# Patient Record
Sex: MALE | Race: WHITE | NOT HISPANIC OR LATINO | URBAN - METROPOLITAN AREA
[De-identification: names, ages, dates, MRNs, and addresses within clinical notes are randomized per-mention and may not be internally consistent; named-entity substitution may affect disease eponyms.]

---

## 2017-01-25 ENCOUNTER — COMMUNICATION - HEALTHEAST (OUTPATIENT)
Dept: FAMILY MEDICINE | Facility: CLINIC | Age: 62
End: 2017-01-25

## 2017-02-24 ENCOUNTER — COMMUNICATION - HEALTHEAST (OUTPATIENT)
Dept: FAMILY MEDICINE | Facility: CLINIC | Age: 62
End: 2017-02-24

## 2017-02-24 DIAGNOSIS — E11.9 TYPE 2 DIABETES MELLITUS WITHOUT COMPLICATION (H): ICD-10-CM

## 2017-04-24 ENCOUNTER — COMMUNICATION - HEALTHEAST (OUTPATIENT)
Dept: FAMILY MEDICINE | Facility: CLINIC | Age: 62
End: 2017-04-24

## 2017-05-12 ENCOUNTER — AMBULATORY - HEALTHEAST (OUTPATIENT)
Dept: FAMILY MEDICINE | Facility: CLINIC | Age: 62
End: 2017-05-12

## 2017-05-12 ENCOUNTER — OFFICE VISIT - HEALTHEAST (OUTPATIENT)
Dept: FAMILY MEDICINE | Facility: CLINIC | Age: 62
End: 2017-05-12

## 2017-05-12 DIAGNOSIS — Z79.899 MEDICATION MANAGEMENT: ICD-10-CM

## 2017-05-12 DIAGNOSIS — Z12.5 PROSTATE CANCER SCREENING: ICD-10-CM

## 2017-05-12 DIAGNOSIS — R80.9 PROTEINURIA: ICD-10-CM

## 2017-05-12 DIAGNOSIS — E78.5 DYSLIPIDEMIA: ICD-10-CM

## 2017-05-12 DIAGNOSIS — I10 ESSENTIAL HYPERTENSION WITH GOAL BLOOD PRESSURE LESS THAN 130/80: ICD-10-CM

## 2017-05-12 DIAGNOSIS — E11.9 TYPE 2 DIABETES MELLITUS (H): ICD-10-CM

## 2017-05-12 LAB
CHOLEST SERPL-MCNC: 162 MG/DL
FASTING STATUS PATIENT QL REPORTED: ABNORMAL
HBA1C MFR BLD: 9.8 % (ref 3.5–6)
HDLC SERPL-MCNC: 43 MG/DL
LDLC SERPL CALC-MCNC: 80 MG/DL
PSA SERPL-MCNC: 2.1 NG/ML (ref 0–3.5)
TRIGL SERPL-MCNC: 193 MG/DL

## 2017-05-12 ASSESSMENT — MIFFLIN-ST. JEOR: SCORE: 1583.25

## 2017-05-15 ENCOUNTER — AMBULATORY - HEALTHEAST (OUTPATIENT)
Dept: FAMILY MEDICINE | Facility: CLINIC | Age: 62
End: 2017-05-15

## 2017-05-15 DIAGNOSIS — R74.8 ELEVATED LIVER ENZYMES: ICD-10-CM

## 2017-05-19 ENCOUNTER — HOSPITAL ENCOUNTER (OUTPATIENT)
Dept: ULTRASOUND IMAGING | Facility: CLINIC | Age: 62
Discharge: HOME | End: 2017-05-19
Attending: NURSE PRACTITIONER

## 2017-05-19 DIAGNOSIS — R74.8 ELEVATED LIVER ENZYMES: ICD-10-CM

## 2017-05-25 ENCOUNTER — COMMUNICATION - HEALTHEAST (OUTPATIENT)
Dept: FAMILY MEDICINE | Facility: CLINIC | Age: 62
End: 2017-05-25

## 2017-05-28 ENCOUNTER — AMBULATORY - HEALTHEAST (OUTPATIENT)
Dept: FAMILY MEDICINE | Facility: CLINIC | Age: 62
End: 2017-05-28

## 2017-05-28 ENCOUNTER — COMMUNICATION - HEALTHEAST (OUTPATIENT)
Dept: FAMILY MEDICINE | Facility: CLINIC | Age: 62
End: 2017-05-28

## 2017-05-28 DIAGNOSIS — E11.9 TYPE 2 DIABETES MELLITUS (H): ICD-10-CM

## 2017-05-28 DIAGNOSIS — R74.8 ELEVATED LIVER ENZYMES: ICD-10-CM

## 2017-06-05 ENCOUNTER — HOSPITAL ENCOUNTER (OUTPATIENT)
Dept: CT IMAGING | Facility: CLINIC | Age: 62
Discharge: HOME | End: 2017-06-05
Attending: NURSE PRACTITIONER

## 2017-06-05 DIAGNOSIS — R74.8 ELEVATED LIVER ENZYMES: ICD-10-CM

## 2017-06-13 ENCOUNTER — AMBULATORY - HEALTHEAST (OUTPATIENT)
Dept: EDUCATION SERVICES | Facility: CLINIC | Age: 62
End: 2017-06-13

## 2017-06-13 ENCOUNTER — OFFICE VISIT - HEALTHEAST (OUTPATIENT)
Dept: FAMILY MEDICINE | Facility: CLINIC | Age: 62
End: 2017-06-13

## 2017-06-13 DIAGNOSIS — R21 RASH: ICD-10-CM

## 2017-06-13 DIAGNOSIS — E11.65 TYPE 2 DIABETES MELLITUS WITH HYPERGLYCEMIA, WITHOUT LONG-TERM CURRENT USE OF INSULIN (H): ICD-10-CM

## 2017-06-13 ASSESSMENT — MIFFLIN-ST. JEOR: SCORE: 1565.57

## 2017-07-05 ENCOUNTER — OFFICE VISIT - HEALTHEAST (OUTPATIENT)
Dept: ENDOCRINOLOGY | Facility: CLINIC | Age: 62
End: 2017-07-05

## 2017-07-05 DIAGNOSIS — E11.9 TYPE 2 DIABETES MELLITUS (H): ICD-10-CM

## 2017-07-05 DIAGNOSIS — I10 ESSENTIAL HYPERTENSION: ICD-10-CM

## 2017-07-05 DIAGNOSIS — R80.9 PROTEINURIA: ICD-10-CM

## 2017-07-05 ASSESSMENT — MIFFLIN-ST. JEOR: SCORE: 1561.94

## 2017-07-26 ENCOUNTER — COMMUNICATION - HEALTHEAST (OUTPATIENT)
Dept: FAMILY MEDICINE | Facility: CLINIC | Age: 62
End: 2017-07-26

## 2017-07-26 DIAGNOSIS — I10 ESSENTIAL HYPERTENSION: ICD-10-CM

## 2017-08-23 ENCOUNTER — COMMUNICATION - HEALTHEAST (OUTPATIENT)
Dept: FAMILY MEDICINE | Facility: CLINIC | Age: 62
End: 2017-08-23

## 2017-08-23 DIAGNOSIS — E78.5 DYSLIPIDEMIA: ICD-10-CM

## 2017-09-27 ENCOUNTER — COMMUNICATION - HEALTHEAST (OUTPATIENT)
Dept: FAMILY MEDICINE | Facility: CLINIC | Age: 62
End: 2017-09-27

## 2017-09-27 DIAGNOSIS — E11.9 DIABETES (H): ICD-10-CM

## 2017-11-14 ENCOUNTER — RECORDS - HEALTHEAST (OUTPATIENT)
Dept: ADMINISTRATIVE | Facility: OTHER | Age: 62
End: 2017-11-14

## 2017-11-18 ENCOUNTER — COMMUNICATION - HEALTHEAST (OUTPATIENT)
Dept: FAMILY MEDICINE | Facility: CLINIC | Age: 62
End: 2017-11-18

## 2017-11-18 DIAGNOSIS — E11.9 TYPE 2 DIABETES MELLITUS (H): ICD-10-CM

## 2017-11-21 ENCOUNTER — COMMUNICATION - HEALTHEAST (OUTPATIENT)
Dept: FAMILY MEDICINE | Facility: CLINIC | Age: 62
End: 2017-11-21

## 2017-12-19 ENCOUNTER — OFFICE VISIT - HEALTHEAST (OUTPATIENT)
Dept: FAMILY MEDICINE | Facility: CLINIC | Age: 62
End: 2017-12-19

## 2017-12-19 ENCOUNTER — AMBULATORY - HEALTHEAST (OUTPATIENT)
Dept: FAMILY MEDICINE | Facility: CLINIC | Age: 62
End: 2017-12-19

## 2017-12-19 DIAGNOSIS — E66.3 OVERWEIGHT: ICD-10-CM

## 2017-12-19 DIAGNOSIS — E78.5 DYSLIPIDEMIA: ICD-10-CM

## 2017-12-19 DIAGNOSIS — E11.65 TYPE 2 DIABETES MELLITUS WITH HYPERGLYCEMIA, WITHOUT LONG-TERM CURRENT USE OF INSULIN (H): ICD-10-CM

## 2017-12-19 DIAGNOSIS — I10 ESSENTIAL HYPERTENSION: ICD-10-CM

## 2017-12-19 DIAGNOSIS — Z00.00 HEALTH CARE MAINTENANCE: ICD-10-CM

## 2017-12-19 LAB — HBA1C MFR BLD: 8.8 % (ref 3.5–6)

## 2017-12-19 ASSESSMENT — MIFFLIN-ST. JEOR: SCORE: 1529.96

## 2017-12-26 ENCOUNTER — COMMUNICATION - HEALTHEAST (OUTPATIENT)
Dept: FAMILY MEDICINE | Facility: CLINIC | Age: 62
End: 2017-12-26

## 2017-12-26 DIAGNOSIS — E11.9 DIABETES (H): ICD-10-CM

## 2018-01-02 ENCOUNTER — AMBULATORY - HEALTHEAST (OUTPATIENT)
Dept: EDUCATION SERVICES | Facility: CLINIC | Age: 63
End: 2018-01-02

## 2018-01-02 DIAGNOSIS — E11.65 TYPE 2 DIABETES MELLITUS WITH HYPERGLYCEMIA, WITHOUT LONG-TERM CURRENT USE OF INSULIN (H): ICD-10-CM

## 2018-01-06 ENCOUNTER — COMMUNICATION - HEALTHEAST (OUTPATIENT)
Dept: FAMILY MEDICINE | Facility: CLINIC | Age: 63
End: 2018-01-06

## 2018-01-06 DIAGNOSIS — E11.9 DIABETES (H): ICD-10-CM

## 2018-01-06 RX ORDER — GLUCOSAMINE HCL/CHONDROITIN SU 500-400 MG
1 CAPSULE ORAL 3 TIMES DAILY
Qty: 100 STRIP | Refills: 5 | Status: SHIPPED | OUTPATIENT
Start: 2018-01-06

## 2018-01-20 ENCOUNTER — COMMUNICATION - HEALTHEAST (OUTPATIENT)
Dept: FAMILY MEDICINE | Facility: CLINIC | Age: 63
End: 2018-01-20

## 2018-01-20 DIAGNOSIS — E11.65 TYPE 2 DIABETES MELLITUS WITH HYPERGLYCEMIA, WITHOUT LONG-TERM CURRENT USE OF INSULIN (H): ICD-10-CM

## 2018-01-28 ENCOUNTER — COMMUNICATION - HEALTHEAST (OUTPATIENT)
Dept: FAMILY MEDICINE | Facility: CLINIC | Age: 63
End: 2018-01-28

## 2018-01-28 DIAGNOSIS — E11.65 TYPE 2 DIABETES MELLITUS WITH HYPERGLYCEMIA, WITHOUT LONG-TERM CURRENT USE OF INSULIN (H): ICD-10-CM

## 2018-02-06 ENCOUNTER — OFFICE VISIT - HEALTHEAST (OUTPATIENT)
Dept: FAMILY MEDICINE | Facility: CLINIC | Age: 63
End: 2018-02-06

## 2018-02-06 DIAGNOSIS — R31.9 HEMATURIA: ICD-10-CM

## 2018-02-06 DIAGNOSIS — E11.65 TYPE 2 DIABETES MELLITUS WITH HYPERGLYCEMIA, WITHOUT LONG-TERM CURRENT USE OF INSULIN (H): ICD-10-CM

## 2018-02-06 LAB
ALBUMIN UR-MCNC: ABNORMAL MG/DL
APPEARANCE UR: CLEAR
BACTERIA #/AREA URNS HPF: ABNORMAL HPF
BILIRUB UR QL STRIP: NEGATIVE
COLOR UR AUTO: YELLOW
GLUCOSE UR STRIP-MCNC: ABNORMAL MG/DL
HGB UR QL STRIP: ABNORMAL
KETONES UR STRIP-MCNC: NEGATIVE MG/DL
LEUKOCYTE ESTERASE UR QL STRIP: NEGATIVE
MUCOUS THREADS #/AREA URNS LPF: ABNORMAL LPF
NITRATE UR QL: NEGATIVE
PH UR STRIP: 6 [PH] (ref 5–8)
RBC #/AREA URNS AUTO: ABNORMAL HPF
SP GR UR STRIP: 1.02 (ref 1–1.03)
SQUAMOUS #/AREA URNS AUTO: ABNORMAL LPF
UROBILINOGEN UR STRIP-ACNC: ABNORMAL
WBC #/AREA URNS AUTO: ABNORMAL HPF

## 2018-02-06 ASSESSMENT — MIFFLIN-ST. JEOR: SCORE: 1553.09

## 2018-02-07 LAB — BACTERIA SPEC CULT: NO GROWTH

## 2018-02-11 ENCOUNTER — AMBULATORY - HEALTHEAST (OUTPATIENT)
Dept: FAMILY MEDICINE | Facility: CLINIC | Age: 63
End: 2018-02-11

## 2018-02-11 DIAGNOSIS — R31.0 GROSS HEMATURIA: ICD-10-CM

## 2018-03-01 ENCOUNTER — COMMUNICATION - HEALTHEAST (OUTPATIENT)
Dept: FAMILY MEDICINE | Facility: CLINIC | Age: 63
End: 2018-03-01

## 2018-03-01 DIAGNOSIS — E11.9 TYPE 2 DIABETES MELLITUS WITHOUT COMPLICATION (H): ICD-10-CM

## 2018-03-02 ENCOUNTER — RECORDS - HEALTHEAST (OUTPATIENT)
Dept: ADMINISTRATIVE | Facility: OTHER | Age: 63
End: 2018-03-02

## 2018-03-30 ENCOUNTER — COMMUNICATION - HEALTHEAST (OUTPATIENT)
Dept: FAMILY MEDICINE | Facility: CLINIC | Age: 63
End: 2018-03-30

## 2018-04-01 ENCOUNTER — COMMUNICATION - HEALTHEAST (OUTPATIENT)
Dept: FAMILY MEDICINE | Facility: CLINIC | Age: 63
End: 2018-04-01

## 2018-04-01 DIAGNOSIS — E11.65 TYPE 2 DIABETES MELLITUS WITH HYPERGLYCEMIA, WITHOUT LONG-TERM CURRENT USE OF INSULIN (H): ICD-10-CM

## 2018-04-12 ENCOUNTER — AMBULATORY - HEALTHEAST (OUTPATIENT)
Dept: FAMILY MEDICINE | Facility: CLINIC | Age: 63
End: 2018-04-12

## 2018-04-12 ENCOUNTER — OFFICE VISIT - HEALTHEAST (OUTPATIENT)
Dept: FAMILY MEDICINE | Facility: CLINIC | Age: 63
End: 2018-04-12

## 2018-04-12 DIAGNOSIS — I10 ESSENTIAL HYPERTENSION: ICD-10-CM

## 2018-04-12 DIAGNOSIS — E11.9 TYPE 2 DIABETES MELLITUS (H): ICD-10-CM

## 2018-04-12 DIAGNOSIS — E11.65 TYPE 2 DIABETES MELLITUS WITH HYPERGLYCEMIA, WITHOUT LONG-TERM CURRENT USE OF INSULIN (H): ICD-10-CM

## 2018-04-12 DIAGNOSIS — E78.5 DYSLIPIDEMIA: ICD-10-CM

## 2018-04-12 DIAGNOSIS — E66.3 OVERWEIGHT: ICD-10-CM

## 2018-04-12 LAB
CREAT UR-MCNC: 27 MG/DL
HBA1C MFR BLD: 9.1 % (ref 3.5–6)
MICROALBUMIN UR-MCNC: 1.87 MG/DL (ref 0–1.99)
MICROALBUMIN/CREAT UR: 69.3 MG/G

## 2018-04-12 ASSESSMENT — MIFFLIN-ST. JEOR: SCORE: 1562.16

## 2018-05-08 ENCOUNTER — COMMUNICATION - HEALTHEAST (OUTPATIENT)
Dept: EDUCATION SERVICES | Facility: CLINIC | Age: 63
End: 2018-05-08

## 2018-05-08 ENCOUNTER — AMBULATORY - HEALTHEAST (OUTPATIENT)
Dept: EDUCATION SERVICES | Facility: CLINIC | Age: 63
End: 2018-05-08

## 2018-05-08 DIAGNOSIS — E11.65 TYPE 2 DIABETES MELLITUS WITH HYPERGLYCEMIA, WITHOUT LONG-TERM CURRENT USE OF INSULIN (H): ICD-10-CM

## 2018-05-09 ENCOUNTER — COMMUNICATION - HEALTHEAST (OUTPATIENT)
Dept: FAMILY MEDICINE | Facility: CLINIC | Age: 63
End: 2018-05-09

## 2018-05-09 DIAGNOSIS — I10 ESSENTIAL HYPERTENSION: ICD-10-CM

## 2018-05-10 ENCOUNTER — COMMUNICATION - HEALTHEAST (OUTPATIENT)
Dept: EDUCATION SERVICES | Facility: CLINIC | Age: 63
End: 2018-05-10

## 2018-05-10 DIAGNOSIS — E11.65 UNCONTROLLED TYPE 2 DIABETES MELLITUS WITH HYPERGLYCEMIA, UNSPECIFIED LONG TERM INSULIN USE STATUS: ICD-10-CM

## 2018-05-11 ENCOUNTER — AMBULATORY - HEALTHEAST (OUTPATIENT)
Dept: ENDOCRINOLOGY | Facility: CLINIC | Age: 63
End: 2018-05-11

## 2018-05-11 DIAGNOSIS — E11.65 TYPE 2 DIABETES MELLITUS WITH HYPERGLYCEMIA, WITHOUT LONG-TERM CURRENT USE OF INSULIN (H): ICD-10-CM

## 2018-05-13 ENCOUNTER — COMMUNICATION - HEALTHEAST (OUTPATIENT)
Dept: EDUCATION SERVICES | Facility: CLINIC | Age: 63
End: 2018-05-13

## 2018-05-13 DIAGNOSIS — E11.65 UNCONTROLLED TYPE 2 DIABETES MELLITUS WITH HYPERGLYCEMIA, UNSPECIFIED LONG TERM INSULIN USE STATUS: ICD-10-CM

## 2018-05-13 DIAGNOSIS — E11.65 TYPE 2 DIABETES MELLITUS WITH HYPERGLYCEMIA, WITHOUT LONG-TERM CURRENT USE OF INSULIN (H): ICD-10-CM

## 2018-05-15 ENCOUNTER — COMMUNICATION - HEALTHEAST (OUTPATIENT)
Dept: FAMILY MEDICINE | Facility: CLINIC | Age: 63
End: 2018-05-15

## 2018-05-17 ENCOUNTER — AMBULATORY - HEALTHEAST (OUTPATIENT)
Dept: FAMILY MEDICINE | Facility: CLINIC | Age: 63
End: 2018-05-17

## 2018-05-18 ENCOUNTER — RECORDS - HEALTHEAST (OUTPATIENT)
Dept: ADMINISTRATIVE | Facility: OTHER | Age: 63
End: 2018-05-18

## 2018-05-18 ENCOUNTER — COMMUNICATION - HEALTHEAST (OUTPATIENT)
Dept: FAMILY MEDICINE | Facility: CLINIC | Age: 63
End: 2018-05-18

## 2018-05-21 ENCOUNTER — AMBULATORY - HEALTHEAST (OUTPATIENT)
Dept: FAMILY MEDICINE | Facility: CLINIC | Age: 63
End: 2018-05-21

## 2018-06-01 ENCOUNTER — COMMUNICATION - HEALTHEAST (OUTPATIENT)
Dept: FAMILY MEDICINE | Facility: CLINIC | Age: 63
End: 2018-06-01

## 2018-06-01 DIAGNOSIS — E78.5 HYPERLIPIDEMIA: ICD-10-CM

## 2018-06-15 ENCOUNTER — OFFICE VISIT - HEALTHEAST (OUTPATIENT)
Dept: ENDOCRINOLOGY | Facility: CLINIC | Age: 63
End: 2018-06-15

## 2018-06-15 DIAGNOSIS — E11.65 TYPE 2 DIABETES MELLITUS WITH HYPERGLYCEMIA, WITHOUT LONG-TERM CURRENT USE OF INSULIN (H): ICD-10-CM

## 2018-06-15 ASSESSMENT — MIFFLIN-ST. JEOR: SCORE: 1529.96

## 2018-09-04 ENCOUNTER — COMMUNICATION - HEALTHEAST (OUTPATIENT)
Dept: FAMILY MEDICINE | Facility: CLINIC | Age: 63
End: 2018-09-04

## 2018-10-03 ENCOUNTER — COMMUNICATION - HEALTHEAST (OUTPATIENT)
Dept: FAMILY MEDICINE | Facility: CLINIC | Age: 63
End: 2018-10-03

## 2018-10-03 DIAGNOSIS — E11.65 TYPE 2 DIABETES MELLITUS WITH HYPERGLYCEMIA, WITH LONG-TERM CURRENT USE OF INSULIN (H): ICD-10-CM

## 2018-10-03 DIAGNOSIS — Z79.4 TYPE 2 DIABETES MELLITUS WITH HYPERGLYCEMIA, WITH LONG-TERM CURRENT USE OF INSULIN (H): ICD-10-CM

## 2018-11-13 ENCOUNTER — RECORDS - HEALTHEAST (OUTPATIENT)
Dept: ADMINISTRATIVE | Facility: OTHER | Age: 63
End: 2018-11-13

## 2018-12-20 ENCOUNTER — COMMUNICATION - HEALTHEAST (OUTPATIENT)
Dept: FAMILY MEDICINE | Facility: CLINIC | Age: 63
End: 2018-12-20

## 2018-12-20 DIAGNOSIS — E11.9 DIABETES (H): ICD-10-CM

## 2019-01-13 ENCOUNTER — COMMUNICATION - HEALTHEAST (OUTPATIENT)
Dept: ENDOCRINOLOGY | Facility: CLINIC | Age: 64
End: 2019-01-13

## 2019-01-14 ENCOUNTER — COMMUNICATION - HEALTHEAST (OUTPATIENT)
Dept: ENDOCRINOLOGY | Facility: CLINIC | Age: 64
End: 2019-01-14

## 2019-01-14 ENCOUNTER — COMMUNICATION - HEALTHEAST (OUTPATIENT)
Dept: FAMILY MEDICINE | Facility: CLINIC | Age: 64
End: 2019-01-14

## 2019-01-14 ENCOUNTER — COMMUNICATION - HEALTHEAST (OUTPATIENT)
Dept: ADMINISTRATIVE | Facility: CLINIC | Age: 64
End: 2019-01-14

## 2019-01-21 ENCOUNTER — COMMUNICATION - HEALTHEAST (OUTPATIENT)
Dept: EDUCATION SERVICES | Facility: CLINIC | Age: 64
End: 2019-01-21

## 2019-01-21 DIAGNOSIS — E11.65 TYPE 2 DIABETES MELLITUS WITH HYPERGLYCEMIA, WITHOUT LONG-TERM CURRENT USE OF INSULIN (H): ICD-10-CM

## 2019-03-13 ENCOUNTER — COMMUNICATION - HEALTHEAST (OUTPATIENT)
Dept: FAMILY MEDICINE | Facility: CLINIC | Age: 64
End: 2019-03-13

## 2019-03-13 DIAGNOSIS — I10 ESSENTIAL HYPERTENSION: ICD-10-CM

## 2019-03-15 ENCOUNTER — COMMUNICATION - HEALTHEAST (OUTPATIENT)
Dept: FAMILY MEDICINE | Facility: CLINIC | Age: 64
End: 2019-03-15

## 2019-03-15 DIAGNOSIS — E11.9 DIABETES (H): ICD-10-CM

## 2019-03-26 ENCOUNTER — OFFICE VISIT - HEALTHEAST (OUTPATIENT)
Dept: FAMILY MEDICINE | Facility: CLINIC | Age: 64
End: 2019-03-26

## 2019-03-26 DIAGNOSIS — Z79.4 TYPE 2 DIABETES MELLITUS WITHOUT COMPLICATION, WITH LONG-TERM CURRENT USE OF INSULIN (H): ICD-10-CM

## 2019-03-26 DIAGNOSIS — E11.9 TYPE 2 DIABETES MELLITUS WITHOUT COMPLICATION, WITH LONG-TERM CURRENT USE OF INSULIN (H): ICD-10-CM

## 2019-03-26 DIAGNOSIS — R80.9 PROTEINURIA, UNSPECIFIED TYPE: ICD-10-CM

## 2019-03-26 DIAGNOSIS — I10 ESSENTIAL HYPERTENSION: ICD-10-CM

## 2019-03-26 DIAGNOSIS — E78.5 DYSLIPIDEMIA: ICD-10-CM

## 2019-03-26 DIAGNOSIS — Z12.5 SCREENING FOR PROSTATE CANCER: ICD-10-CM

## 2019-03-26 DIAGNOSIS — Z00.00 ROUTINE GENERAL MEDICAL EXAMINATION AT A HEALTH CARE FACILITY: ICD-10-CM

## 2019-03-26 DIAGNOSIS — R74.8 ELEVATED LIVER ENZYMES: ICD-10-CM

## 2019-03-26 DIAGNOSIS — Z79.899 MEDICATION MANAGEMENT: ICD-10-CM

## 2019-03-26 LAB
ALBUMIN SERPL-MCNC: 4.1 G/DL (ref 3.5–5)
ALP SERPL-CCNC: 65 U/L (ref 45–120)
ALT SERPL W P-5'-P-CCNC: 29 U/L (ref 0–45)
ANION GAP SERPL CALCULATED.3IONS-SCNC: 11 MMOL/L (ref 5–18)
AST SERPL W P-5'-P-CCNC: 20 U/L (ref 0–40)
BILIRUB SERPL-MCNC: 0.4 MG/DL (ref 0–1)
BUN SERPL-MCNC: 17 MG/DL (ref 8–22)
CALCIUM SERPL-MCNC: 10 MG/DL (ref 8.5–10.5)
CHLORIDE BLD-SCNC: 102 MMOL/L (ref 98–107)
CHOLEST SERPL-MCNC: 145 MG/DL
CO2 SERPL-SCNC: 24 MMOL/L (ref 22–31)
CREAT SERPL-MCNC: 0.92 MG/DL (ref 0.7–1.3)
CREAT UR-MCNC: 136.9 MG/DL
FASTING STATUS PATIENT QL REPORTED: NO
GFR SERPL CREATININE-BSD FRML MDRD: >60 ML/MIN/1.73M2
GLUCOSE BLD-MCNC: 218 MG/DL (ref 70–125)
HBA1C MFR BLD: 9.7 % (ref 3.5–6)
HDLC SERPL-MCNC: 53 MG/DL
LDLC SERPL CALC-MCNC: 64 MG/DL
MICROALBUMIN UR-MCNC: 6.94 MG/DL (ref 0–1.99)
MICROALBUMIN/CREAT UR: 50.7 MG/G
POTASSIUM BLD-SCNC: 4.3 MMOL/L (ref 3.5–5)
PROT SERPL-MCNC: 7.3 G/DL (ref 6–8)
PSA SERPL-MCNC: 2.6 NG/ML (ref 0–4.5)
SODIUM SERPL-SCNC: 137 MMOL/L (ref 136–145)
TRIGL SERPL-MCNC: 139 MG/DL

## 2019-03-26 ASSESSMENT — MIFFLIN-ST. JEOR: SCORE: 1514.54

## 2019-04-03 ENCOUNTER — AMBULATORY - HEALTHEAST (OUTPATIENT)
Dept: FAMILY MEDICINE | Facility: CLINIC | Age: 64
End: 2019-04-03

## 2019-04-03 ENCOUNTER — COMMUNICATION - HEALTHEAST (OUTPATIENT)
Dept: FAMILY MEDICINE | Facility: CLINIC | Age: 64
End: 2019-04-03

## 2019-04-03 DIAGNOSIS — E11.65 TYPE 2 DIABETES MELLITUS WITH HYPERGLYCEMIA, WITHOUT LONG-TERM CURRENT USE OF INSULIN (H): ICD-10-CM

## 2019-04-09 ENCOUNTER — COMMUNICATION - HEALTHEAST (OUTPATIENT)
Dept: PHARMACY | Facility: CLINIC | Age: 64
End: 2019-04-09

## 2019-04-17 ENCOUNTER — COMMUNICATION - HEALTHEAST (OUTPATIENT)
Dept: FAMILY MEDICINE | Facility: CLINIC | Age: 64
End: 2019-04-17

## 2019-04-17 DIAGNOSIS — E11.9 DIABETES (H): ICD-10-CM

## 2019-05-01 ENCOUNTER — COMMUNICATION - HEALTHEAST (OUTPATIENT)
Dept: FAMILY MEDICINE | Facility: CLINIC | Age: 64
End: 2019-05-01

## 2019-05-18 ENCOUNTER — COMMUNICATION - HEALTHEAST (OUTPATIENT)
Dept: FAMILY MEDICINE | Facility: CLINIC | Age: 64
End: 2019-05-18

## 2019-05-18 DIAGNOSIS — E78.5 HYPERLIPIDEMIA: ICD-10-CM

## 2019-05-23 ENCOUNTER — OFFICE VISIT - HEALTHEAST (OUTPATIENT)
Dept: PHARMACY | Facility: CLINIC | Age: 64
End: 2019-05-23

## 2019-05-23 ENCOUNTER — AMBULATORY - HEALTHEAST (OUTPATIENT)
Dept: FAMILY MEDICINE | Facility: CLINIC | Age: 64
End: 2019-05-23

## 2019-05-23 DIAGNOSIS — E11.65 TYPE 2 DIABETES MELLITUS WITH HYPERGLYCEMIA, WITHOUT LONG-TERM CURRENT USE OF INSULIN (H): ICD-10-CM

## 2019-05-23 DIAGNOSIS — Z79.4 TYPE 2 DIABETES MELLITUS WITH HYPERGLYCEMIA, WITH LONG-TERM CURRENT USE OF INSULIN (H): ICD-10-CM

## 2019-05-23 DIAGNOSIS — E11.65 TYPE 2 DIABETES MELLITUS WITH HYPERGLYCEMIA, WITH LONG-TERM CURRENT USE OF INSULIN (H): ICD-10-CM

## 2019-06-03 ENCOUNTER — COMMUNICATION - HEALTHEAST (OUTPATIENT)
Dept: PHARMACY | Facility: CLINIC | Age: 64
End: 2019-06-03

## 2019-06-15 ENCOUNTER — COMMUNICATION - HEALTHEAST (OUTPATIENT)
Dept: FAMILY MEDICINE | Facility: CLINIC | Age: 64
End: 2019-06-15

## 2019-06-15 DIAGNOSIS — I10 ESSENTIAL HYPERTENSION: ICD-10-CM

## 2019-06-28 ENCOUNTER — OFFICE VISIT - HEALTHEAST (OUTPATIENT)
Dept: FAMILY MEDICINE | Facility: CLINIC | Age: 64
End: 2019-06-28

## 2019-06-28 DIAGNOSIS — E78.5 DYSLIPIDEMIA: ICD-10-CM

## 2019-06-28 DIAGNOSIS — I10 ESSENTIAL HYPERTENSION: ICD-10-CM

## 2019-06-28 DIAGNOSIS — E11.9 TYPE 2 DIABETES MELLITUS (H): ICD-10-CM

## 2019-06-28 DIAGNOSIS — Z12.11 COLON CANCER SCREENING: ICD-10-CM

## 2019-06-28 LAB — HBA1C MFR BLD: 8.6 % (ref 3.5–6)

## 2019-06-28 ASSESSMENT — MIFFLIN-ST. JEOR: SCORE: 1494.13

## 2019-07-17 ENCOUNTER — RECORDS - HEALTHEAST (OUTPATIENT)
Dept: ADMINISTRATIVE | Facility: OTHER | Age: 64
End: 2019-07-17

## 2019-07-17 LAB — COLOGUARD-ABSTRACT: NEGATIVE

## 2019-07-22 ENCOUNTER — COMMUNICATION - HEALTHEAST (OUTPATIENT)
Dept: EDUCATION SERVICES | Facility: CLINIC | Age: 64
End: 2019-07-22

## 2019-07-22 DIAGNOSIS — E11.65 TYPE 2 DIABETES MELLITUS WITH HYPERGLYCEMIA, WITHOUT LONG-TERM CURRENT USE OF INSULIN (H): ICD-10-CM

## 2019-07-23 ENCOUNTER — RECORDS - HEALTHEAST (OUTPATIENT)
Dept: ADMINISTRATIVE | Facility: OTHER | Age: 64
End: 2019-07-23

## 2019-08-01 ENCOUNTER — COMMUNICATION - HEALTHEAST (OUTPATIENT)
Dept: PHARMACY | Facility: CLINIC | Age: 64
End: 2019-08-01

## 2019-08-16 ENCOUNTER — RECORDS - HEALTHEAST (OUTPATIENT)
Dept: HEALTH INFORMATION MANAGEMENT | Facility: CLINIC | Age: 64
End: 2019-08-16

## 2019-10-11 ENCOUNTER — OFFICE VISIT - HEALTHEAST (OUTPATIENT)
Dept: FAMILY MEDICINE | Facility: CLINIC | Age: 64
End: 2019-10-11

## 2019-10-11 DIAGNOSIS — I10 ESSENTIAL HYPERTENSION: ICD-10-CM

## 2019-10-11 DIAGNOSIS — E11.65 TYPE 2 DIABETES MELLITUS WITH HYPERGLYCEMIA, WITHOUT LONG-TERM CURRENT USE OF INSULIN (H): ICD-10-CM

## 2019-10-11 DIAGNOSIS — E78.5 DYSLIPIDEMIA: ICD-10-CM

## 2019-10-11 LAB — HBA1C MFR BLD: 8 % (ref 3.5–6)

## 2019-10-11 ASSESSMENT — MIFFLIN-ST. JEOR: SCORE: 1493.22

## 2019-10-16 ENCOUNTER — COMMUNICATION - HEALTHEAST (OUTPATIENT)
Dept: NURSING | Facility: CLINIC | Age: 64
End: 2019-10-16

## 2019-10-21 ENCOUNTER — COMMUNICATION - HEALTHEAST (OUTPATIENT)
Dept: NURSING | Facility: CLINIC | Age: 64
End: 2019-10-21

## 2019-10-25 ENCOUNTER — COMMUNICATION - HEALTHEAST (OUTPATIENT)
Dept: EDUCATION SERVICES | Facility: CLINIC | Age: 64
End: 2019-10-25

## 2019-10-25 DIAGNOSIS — E11.65 TYPE 2 DIABETES MELLITUS WITH HYPERGLYCEMIA, WITHOUT LONG-TERM CURRENT USE OF INSULIN (H): ICD-10-CM

## 2019-12-06 ENCOUNTER — RECORDS - HEALTHEAST (OUTPATIENT)
Dept: ADMINISTRATIVE | Facility: OTHER | Age: 64
End: 2019-12-06

## 2019-12-06 LAB — RETINOPATHY: NEGATIVE

## 2019-12-17 ENCOUNTER — RECORDS - HEALTHEAST (OUTPATIENT)
Dept: HEALTH INFORMATION MANAGEMENT | Facility: CLINIC | Age: 64
End: 2019-12-17

## 2020-01-06 ENCOUNTER — COMMUNICATION - HEALTHEAST (OUTPATIENT)
Dept: PHARMACY | Facility: CLINIC | Age: 65
End: 2020-01-06

## 2020-01-06 DIAGNOSIS — E11.65 TYPE 2 DIABETES MELLITUS WITH HYPERGLYCEMIA, WITH LONG-TERM CURRENT USE OF INSULIN (H): ICD-10-CM

## 2020-01-06 DIAGNOSIS — Z79.4 TYPE 2 DIABETES MELLITUS WITH HYPERGLYCEMIA, WITH LONG-TERM CURRENT USE OF INSULIN (H): ICD-10-CM

## 2020-02-04 ENCOUNTER — COMMUNICATION - HEALTHEAST (OUTPATIENT)
Dept: EDUCATION SERVICES | Facility: CLINIC | Age: 65
End: 2020-02-04

## 2020-02-04 ENCOUNTER — COMMUNICATION - HEALTHEAST (OUTPATIENT)
Dept: FAMILY MEDICINE | Facility: CLINIC | Age: 65
End: 2020-02-04

## 2020-02-04 DIAGNOSIS — E78.5 HYPERLIPIDEMIA: ICD-10-CM

## 2020-02-04 DIAGNOSIS — E11.65 TYPE 2 DIABETES MELLITUS WITH HYPERGLYCEMIA, WITHOUT LONG-TERM CURRENT USE OF INSULIN (H): ICD-10-CM

## 2020-02-21 ENCOUNTER — COMMUNICATION - HEALTHEAST (OUTPATIENT)
Dept: FAMILY MEDICINE | Facility: CLINIC | Age: 65
End: 2020-02-21

## 2020-03-02 ENCOUNTER — COMMUNICATION - HEALTHEAST (OUTPATIENT)
Dept: FAMILY MEDICINE | Facility: CLINIC | Age: 65
End: 2020-03-02

## 2020-03-02 DIAGNOSIS — I10 ESSENTIAL HYPERTENSION: ICD-10-CM

## 2020-04-01 ENCOUNTER — COMMUNICATION - HEALTHEAST (OUTPATIENT)
Dept: FAMILY MEDICINE | Facility: CLINIC | Age: 65
End: 2020-04-01

## 2020-04-01 DIAGNOSIS — E11.65 TYPE 2 DIABETES MELLITUS WITH HYPERGLYCEMIA, WITHOUT LONG-TERM CURRENT USE OF INSULIN (H): ICD-10-CM

## 2020-04-03 ENCOUNTER — OFFICE VISIT - HEALTHEAST (OUTPATIENT)
Dept: FAMILY MEDICINE | Facility: CLINIC | Age: 65
End: 2020-04-03

## 2020-04-03 DIAGNOSIS — Z79.4 TYPE 2 DIABETES MELLITUS WITH HYPERGLYCEMIA, WITH LONG-TERM CURRENT USE OF INSULIN (H): ICD-10-CM

## 2020-04-03 DIAGNOSIS — E78.5 DYSLIPIDEMIA: ICD-10-CM

## 2020-04-03 DIAGNOSIS — I10 ESSENTIAL HYPERTENSION: ICD-10-CM

## 2020-04-03 DIAGNOSIS — E11.65 TYPE 2 DIABETES MELLITUS WITH HYPERGLYCEMIA, WITH LONG-TERM CURRENT USE OF INSULIN (H): ICD-10-CM

## 2020-04-12 ENCOUNTER — COMMUNICATION - HEALTHEAST (OUTPATIENT)
Dept: FAMILY MEDICINE | Facility: CLINIC | Age: 65
End: 2020-04-12

## 2020-04-12 DIAGNOSIS — N52.9 ERECTILE DYSFUNCTION, UNSPECIFIED ERECTILE DYSFUNCTION TYPE: ICD-10-CM

## 2020-04-13 RX ORDER — SILDENAFIL 50 MG/1
25-50 TABLET, FILM COATED ORAL DAILY PRN
Qty: 30 TABLET | Refills: 5 | Status: SHIPPED | OUTPATIENT
Start: 2020-04-13 | End: 2023-03-30

## 2020-06-02 ENCOUNTER — COMMUNICATION - HEALTHEAST (OUTPATIENT)
Dept: FAMILY MEDICINE | Facility: CLINIC | Age: 65
End: 2020-06-02

## 2020-06-02 DIAGNOSIS — I10 ESSENTIAL HYPERTENSION: ICD-10-CM

## 2020-06-02 DIAGNOSIS — E11.65 TYPE 2 DIABETES MELLITUS WITH HYPERGLYCEMIA, WITHOUT LONG-TERM CURRENT USE OF INSULIN (H): ICD-10-CM

## 2020-07-13 ENCOUNTER — COMMUNICATION - HEALTHEAST (OUTPATIENT)
Dept: FAMILY MEDICINE | Facility: CLINIC | Age: 65
End: 2020-07-13

## 2020-07-13 DIAGNOSIS — E11.65 TYPE 2 DIABETES MELLITUS WITH HYPERGLYCEMIA, WITH LONG-TERM CURRENT USE OF INSULIN (H): ICD-10-CM

## 2020-07-13 DIAGNOSIS — Z79.4 TYPE 2 DIABETES MELLITUS WITH HYPERGLYCEMIA, WITH LONG-TERM CURRENT USE OF INSULIN (H): ICD-10-CM

## 2020-08-03 ENCOUNTER — COMMUNICATION - HEALTHEAST (OUTPATIENT)
Dept: FAMILY MEDICINE | Facility: CLINIC | Age: 65
End: 2020-08-03

## 2020-08-14 ENCOUNTER — COMMUNICATION - HEALTHEAST (OUTPATIENT)
Dept: FAMILY MEDICINE | Facility: CLINIC | Age: 65
End: 2020-08-14

## 2020-08-14 DIAGNOSIS — I10 ESSENTIAL HYPERTENSION: ICD-10-CM

## 2020-10-21 ENCOUNTER — COMMUNICATION - HEALTHEAST (OUTPATIENT)
Dept: FAMILY MEDICINE | Facility: CLINIC | Age: 65
End: 2020-10-21

## 2020-10-21 DIAGNOSIS — E11.65 TYPE 2 DIABETES MELLITUS WITH HYPERGLYCEMIA, WITHOUT LONG-TERM CURRENT USE OF INSULIN (H): ICD-10-CM

## 2020-10-30 ENCOUNTER — OFFICE VISIT - HEALTHEAST (OUTPATIENT)
Dept: FAMILY MEDICINE | Facility: CLINIC | Age: 65
End: 2020-10-30

## 2020-10-30 DIAGNOSIS — Z79.899 MEDICATION MANAGEMENT: ICD-10-CM

## 2020-10-30 DIAGNOSIS — R80.9 PROTEINURIA, UNSPECIFIED TYPE: ICD-10-CM

## 2020-10-30 DIAGNOSIS — E78.5 DYSLIPIDEMIA: ICD-10-CM

## 2020-10-30 DIAGNOSIS — Z11.4 ENCOUNTER FOR SCREENING FOR HIV: ICD-10-CM

## 2020-10-30 DIAGNOSIS — E11.65 TYPE 2 DIABETES MELLITUS WITH HYPERGLYCEMIA, WITHOUT LONG-TERM CURRENT USE OF INSULIN (H): ICD-10-CM

## 2020-10-30 DIAGNOSIS — Z12.5 SCREENING FOR PROSTATE CANCER: ICD-10-CM

## 2020-10-30 DIAGNOSIS — I10 ESSENTIAL HYPERTENSION: ICD-10-CM

## 2020-10-30 DIAGNOSIS — Z11.59 ENCOUNTER FOR HEPATITIS C SCREENING TEST FOR LOW RISK PATIENT: ICD-10-CM

## 2020-10-30 LAB
ALBUMIN SERPL-MCNC: 4.2 G/DL (ref 3.5–5)
ALP SERPL-CCNC: 69 U/L (ref 45–120)
ALT SERPL W P-5'-P-CCNC: 21 U/L (ref 0–45)
ANION GAP SERPL CALCULATED.3IONS-SCNC: 9 MMOL/L (ref 5–18)
AST SERPL W P-5'-P-CCNC: 16 U/L (ref 0–40)
BILIRUB SERPL-MCNC: 0.4 MG/DL (ref 0–1)
BUN SERPL-MCNC: 14 MG/DL (ref 8–22)
CALCIUM SERPL-MCNC: 9.9 MG/DL (ref 8.5–10.5)
CHLORIDE BLD-SCNC: 101 MMOL/L (ref 98–107)
CO2 SERPL-SCNC: 27 MMOL/L (ref 22–31)
CREAT SERPL-MCNC: 0.77 MG/DL (ref 0.7–1.3)
CREAT UR-MCNC: 41.3 MG/DL
GFR SERPL CREATININE-BSD FRML MDRD: >60 ML/MIN/1.73M2
GLUCOSE BLD-MCNC: 260 MG/DL (ref 70–125)
HBA1C MFR BLD: 9.9 %
HIV 1+2 AB+HIV1 P24 AG SERPL QL IA: NEGATIVE
LDLC SERPL CALC-MCNC: 85 MG/DL
MICROALBUMIN UR-MCNC: 4.07 MG/DL (ref 0–1.99)
MICROALBUMIN/CREAT UR: 98.5 MG/G
POTASSIUM BLD-SCNC: 4.4 MMOL/L (ref 3.5–5)
PROT SERPL-MCNC: 7.2 G/DL (ref 6–8)
PSA SERPL-MCNC: 2.6 NG/ML (ref 0–4.5)
SODIUM SERPL-SCNC: 137 MMOL/L (ref 136–145)

## 2020-10-30 ASSESSMENT — MIFFLIN-ST. JEOR: SCORE: 1547.06

## 2020-11-02 LAB — HCV AB SERPL QL IA: NEGATIVE

## 2020-11-04 ENCOUNTER — AMBULATORY - HEALTHEAST (OUTPATIENT)
Dept: FAMILY MEDICINE | Facility: CLINIC | Age: 65
End: 2020-11-04

## 2020-11-04 DIAGNOSIS — R80.9 MICROALBUMINURIA: ICD-10-CM

## 2020-11-05 ENCOUNTER — COMMUNICATION - HEALTHEAST (OUTPATIENT)
Dept: FAMILY MEDICINE | Facility: CLINIC | Age: 65
End: 2020-11-05

## 2020-11-06 ENCOUNTER — COMMUNICATION - HEALTHEAST (OUTPATIENT)
Dept: PHARMACY | Facility: CLINIC | Age: 65
End: 2020-11-06

## 2020-11-14 ENCOUNTER — COMMUNICATION - HEALTHEAST (OUTPATIENT)
Dept: FAMILY MEDICINE | Facility: CLINIC | Age: 65
End: 2020-11-14

## 2020-11-14 DIAGNOSIS — E11.65 TYPE 2 DIABETES MELLITUS WITH HYPERGLYCEMIA, WITHOUT LONG-TERM CURRENT USE OF INSULIN (H): ICD-10-CM

## 2020-11-14 DIAGNOSIS — E78.5 HYPERLIPIDEMIA: ICD-10-CM

## 2020-11-16 RX ORDER — PRAVASTATIN SODIUM 40 MG
TABLET ORAL
Qty: 90 TABLET | Refills: 3 | Status: SHIPPED | OUTPATIENT
Start: 2020-11-16 | End: 2021-11-04

## 2020-12-11 ENCOUNTER — OFFICE VISIT - HEALTHEAST (OUTPATIENT)
Dept: PHARMACY | Facility: CLINIC | Age: 65
End: 2020-12-11

## 2020-12-11 DIAGNOSIS — E11.65 TYPE 2 DIABETES MELLITUS WITH HYPERGLYCEMIA, WITHOUT LONG-TERM CURRENT USE OF INSULIN (H): ICD-10-CM

## 2020-12-11 DIAGNOSIS — Z79.4 TYPE 2 DIABETES MELLITUS WITH HYPERGLYCEMIA, WITH LONG-TERM CURRENT USE OF INSULIN (H): ICD-10-CM

## 2020-12-11 DIAGNOSIS — E11.65 TYPE 2 DIABETES MELLITUS WITH HYPERGLYCEMIA, WITH LONG-TERM CURRENT USE OF INSULIN (H): ICD-10-CM

## 2020-12-11 PROCEDURE — 99605 MTMS BY PHARM NP 15 MIN: CPT | Performed by: PHARMACIST

## 2020-12-14 ENCOUNTER — COMMUNICATION - HEALTHEAST (OUTPATIENT)
Dept: FAMILY MEDICINE | Facility: CLINIC | Age: 65
End: 2020-12-14

## 2020-12-14 DIAGNOSIS — I10 ESSENTIAL HYPERTENSION: ICD-10-CM

## 2020-12-15 ENCOUNTER — RECORDS - HEALTHEAST (OUTPATIENT)
Dept: ADMINISTRATIVE | Facility: OTHER | Age: 65
End: 2020-12-15

## 2020-12-16 ENCOUNTER — RECORDS - HEALTHEAST (OUTPATIENT)
Dept: ADMINISTRATIVE | Facility: OTHER | Age: 65
End: 2020-12-16

## 2020-12-28 ENCOUNTER — HOSPITAL ENCOUNTER (OUTPATIENT)
Dept: ULTRASOUND IMAGING | Facility: HOSPITAL | Age: 65
Discharge: HOME | End: 2020-12-28

## 2020-12-28 DIAGNOSIS — I10 HTN (HYPERTENSION): ICD-10-CM

## 2020-12-28 DIAGNOSIS — R80.9 PROTEINURIA: ICD-10-CM

## 2020-12-28 DIAGNOSIS — E11.9 DIABETES MELLITUS, TYPE 2 (H): ICD-10-CM

## 2021-01-04 ENCOUNTER — OFFICE VISIT - HEALTHEAST (OUTPATIENT)
Dept: PHARMACY | Facility: CLINIC | Age: 66
End: 2021-01-04

## 2021-01-04 DIAGNOSIS — E11.65 TYPE 2 DIABETES MELLITUS WITH HYPERGLYCEMIA, WITHOUT LONG-TERM CURRENT USE OF INSULIN (H): ICD-10-CM

## 2021-01-04 DIAGNOSIS — R80.9 PROTEINURIA, UNSPECIFIED TYPE: ICD-10-CM

## 2021-01-04 PROCEDURE — 99606 MTMS BY PHARM EST 15 MIN: CPT | Performed by: PHARMACIST

## 2021-01-04 RX ORDER — LISINOPRIL 10 MG/1
10 TABLET ORAL DAILY
Status: SHIPPED | COMMUNITY
Start: 2020-12-15 | End: 2021-11-04

## 2021-01-13 ENCOUNTER — COMMUNICATION - HEALTHEAST (OUTPATIENT)
Dept: FAMILY MEDICINE | Facility: CLINIC | Age: 66
End: 2021-01-13

## 2021-01-13 DIAGNOSIS — E11.65 TYPE 2 DIABETES MELLITUS WITH HYPERGLYCEMIA, WITHOUT LONG-TERM CURRENT USE OF INSULIN (H): ICD-10-CM

## 2021-02-08 ENCOUNTER — COMMUNICATION - HEALTHEAST (OUTPATIENT)
Dept: FAMILY MEDICINE | Facility: CLINIC | Age: 66
End: 2021-02-08

## 2021-02-08 DIAGNOSIS — E11.65 TYPE 2 DIABETES MELLITUS WITH HYPERGLYCEMIA, WITH LONG-TERM CURRENT USE OF INSULIN (H): ICD-10-CM

## 2021-02-08 DIAGNOSIS — Z79.4 TYPE 2 DIABETES MELLITUS WITH HYPERGLYCEMIA, WITH LONG-TERM CURRENT USE OF INSULIN (H): ICD-10-CM

## 2021-02-15 ENCOUNTER — COMMUNICATION - HEALTHEAST (OUTPATIENT)
Dept: FAMILY MEDICINE | Facility: CLINIC | Age: 66
End: 2021-02-15

## 2021-02-15 DIAGNOSIS — E11.65 TYPE 2 DIABETES MELLITUS WITH HYPERGLYCEMIA, WITH LONG-TERM CURRENT USE OF INSULIN (H): ICD-10-CM

## 2021-02-15 DIAGNOSIS — Z79.4 TYPE 2 DIABETES MELLITUS WITH HYPERGLYCEMIA, WITH LONG-TERM CURRENT USE OF INSULIN (H): ICD-10-CM

## 2021-03-03 ENCOUNTER — OFFICE VISIT - HEALTHEAST (OUTPATIENT)
Dept: FAMILY MEDICINE | Facility: CLINIC | Age: 66
End: 2021-03-03

## 2021-03-03 DIAGNOSIS — E11.65 TYPE 2 DIABETES MELLITUS WITH HYPERGLYCEMIA, WITHOUT LONG-TERM CURRENT USE OF INSULIN (H): ICD-10-CM

## 2021-03-03 DIAGNOSIS — R80.9 PROTEINURIA, UNSPECIFIED TYPE: ICD-10-CM

## 2021-03-03 DIAGNOSIS — E78.5 DYSLIPIDEMIA: ICD-10-CM

## 2021-03-03 DIAGNOSIS — I10 ESSENTIAL HYPERTENSION: ICD-10-CM

## 2021-03-03 LAB — HBA1C MFR BLD: 9.2 %

## 2021-03-04 ENCOUNTER — OFFICE VISIT - HEALTHEAST (OUTPATIENT)
Dept: PHARMACY | Facility: CLINIC | Age: 66
End: 2021-03-04

## 2021-03-04 DIAGNOSIS — Z79.4 TYPE 2 DIABETES MELLITUS WITH HYPERGLYCEMIA, WITH LONG-TERM CURRENT USE OF INSULIN (H): ICD-10-CM

## 2021-03-04 DIAGNOSIS — E11.65 TYPE 2 DIABETES MELLITUS WITH HYPERGLYCEMIA, WITH LONG-TERM CURRENT USE OF INSULIN (H): ICD-10-CM

## 2021-03-04 PROCEDURE — 99606 MTMS BY PHARM EST 15 MIN: CPT | Performed by: PHARMACIST

## 2021-03-05 ENCOUNTER — COMMUNICATION - HEALTHEAST (OUTPATIENT)
Dept: FAMILY MEDICINE | Facility: CLINIC | Age: 66
End: 2021-03-05

## 2021-03-25 ENCOUNTER — OFFICE VISIT - HEALTHEAST (OUTPATIENT)
Dept: PHARMACY | Facility: CLINIC | Age: 66
End: 2021-03-25

## 2021-03-25 DIAGNOSIS — E11.65 TYPE 2 DIABETES MELLITUS WITH HYPERGLYCEMIA, WITH LONG-TERM CURRENT USE OF INSULIN (H): ICD-10-CM

## 2021-03-25 DIAGNOSIS — Z79.4 TYPE 2 DIABETES MELLITUS WITH HYPERGLYCEMIA, WITH LONG-TERM CURRENT USE OF INSULIN (H): ICD-10-CM

## 2021-03-25 PROCEDURE — 99606 MTMS BY PHARM EST 15 MIN: CPT | Performed by: PHARMACIST

## 2021-03-25 RX ORDER — (INSULIN DEGLUDEC AND LIRAGLUTIDE) 100; 3.6 [IU]/ML; MG/ML
30 INJECTION, SOLUTION SUBCUTANEOUS DAILY
Qty: 15 ML | Refills: 2 | Status: SHIPPED
Start: 2021-03-25 | End: 2021-07-26

## 2021-03-29 ENCOUNTER — RECORDS - HEALTHEAST (OUTPATIENT)
Dept: ADMINISTRATIVE | Facility: OTHER | Age: 66
End: 2021-03-29

## 2021-05-27 NOTE — TELEPHONE ENCOUNTER
Received MTM referral from provider     Patient was not reachable after several attempts, will route to MTM Pharmacist/Provider as an FYI. Left MTM scheduling information on patients voicemail.    Thank you for the referral,    Lashae Wetzel, MTM Coordinator

## 2021-05-27 NOTE — PROGRESS NOTES
Assessment and Plan:     1. Routine general medical examination at a health care facility  Td, Preservative Free (green label)   2. Screening for prostate cancer  PSA (Prostatic-Specific Antigen), Annual Screen   3. Type 2 diabetes mellitus without complication, with long-term current use of insulin (H)  Glycosylated Hemoglobin A1c    Microalbumin, Random Urine   4. Hypertension     5. Proteinuria, unspecified type     6. Elevated liver enzymes     7. Dyslipidemia  Lipid Cascade   8. Medication management  Comprehensive Metabolic Panel     Discussed consuming a healthy diet and exercising.  Updated TD vaccine.  Discussed shingles vaccine.  He may consider this.  Will check PSA for prostate cancer screening.  Patient has uncontrolled diabetes.  He has not had an A1c since April 2018.  We will check this today.  He has a history of microalbuminuria.  Blood pressure is controlled with lisinopril.  He continues pravastatin.  Will adjust according to lipid cascade results.  He is to follow-up in 3 months for medication management or sooner with any further concerns.    Subjective:     Ced is a 63 y.o. male presenting to the clinic for a male physical.  Patient has been  for 38 years.  He has 2 children.  He is consuming a healthy diet.  He exercises with walking and bike riding.  He has type 2 diabetes.  Last hemoglobin A1c was 9.1% on 4/12/18.  He checks his blood sugars when he is fasting in the morning.  His blood sugars have ranged around 120-145.  He is currently taking metformin 1000 mg twice daily and injecting Xultophy 20 units daily.  He has hypertension which is controlled with lisinopril.  He has hyperlipidemia and is not fasting today.  He has not had his cholesterol checked since 2017, so he is interested in this today.  He understands the triglycerides may be elevated.  He is taking pravastatin 40 mg daily.  Last cholesterol check was on 5/12/17 with a total cholesterol 162, triglycerides 193,  HDL 43, LDL 80.  He takes sildenafil as needed for male erectile dysfunction.  He is feeling well today and has no other concerns.    Review of Systems: A complete 14 point review of systems was obtained and is negative or as stated in the history of present illness.    Social History     Socioeconomic History     Marital status:      Spouse name: Not on file     Number of children: Not on file     Years of education: Not on file     Highest education level: Not on file   Occupational History     Not on file   Social Needs     Financial resource strain: Not on file     Food insecurity:     Worry: Not on file     Inability: Not on file     Transportation needs:     Medical: Not on file     Non-medical: Not on file   Tobacco Use     Smoking status: Never Smoker     Smokeless tobacco: Never Used   Substance and Sexual Activity     Alcohol use: Yes     Comment: Very little     Drug use: No     Sexual activity: Yes     Partners: Female     Comment:    Lifestyle     Physical activity:     Days per week: Not on file     Minutes per session: Not on file     Stress: Not on file   Relationships     Social connections:     Talks on phone: Not on file     Gets together: Not on file     Attends Taoist service: Not on file     Active member of club or organization: Not on file     Attends meetings of clubs or organizations: Not on file     Relationship status: Not on file     Intimate partner violence:     Fear of current or ex partner: Not on file     Emotionally abused: Not on file     Physically abused: Not on file     Forced sexual activity: Not on file   Other Topics Concern     Not on file   Social History Narrative     Not on file       Active Ambulatory Problems     Diagnosis Date Noted     Microalbuminuria      Hypertension      Type 2 diabetes mellitus (H)      Dyslipidemia 08/26/2016     Elevated liver enzymes 05/28/2017     Resolved Ambulatory Problems     Diagnosis Date Noted     Hyperlipidemia       "Chest Pain      Acute Upper Respiratory Infection      Past Medical History:   Diagnosis Date     Diabetes mellitus (H)        Family History   Problem Relation Age of Onset     Breast cancer Mother      Diabetes Father        Objective:     /68   Pulse 97   Ht 5' 6.5\" (1.689 m)   Wt 169 lb 8 oz (76.9 kg)   SpO2 97%   BMI 26.95 kg/m      General Appearance:    Alert, cooperative, no distress, appears stated age   Head:    Normocephalic, without obvious abnormality, atraumatic   Eyes:    PERRL, conjunctiva/corneas clear, EOM's intact, fundi     benign, both eyes        Ears:    Normal TM's and external ear canals, both ears   Nose:   Nares normal, septum midline, mucosa normal, no drainage    or sinus tenderness   Throat:   Lips, mucosa, and tongue normal; teeth and gums normal   Neck:   Supple, symmetrical, trachea midline, no adenopathy;        thyroid:  No enlargement/tenderness/nodules; no carotid    bruit or JVD   Back:     Symmetric, no curvature, ROM normal, no CVA tenderness   Lungs:     Clear to auscultation bilaterally, respirations unlabored   Chest wall:    No tenderness or deformity   Heart:    Regular rate and rhythm, S1 and S2 normal, no murmur, rub    or gallop   Abdomen:     Soft, non-tender, bowel sounds active all four quadrants,     no masses, no organomegaly   Genitalia:    Deferred per patient    Rectal:    Deferred per patient   Extremities:   Extremities normal, atraumatic, no cyanosis or edema   Pulses:   2+ and symmetric all extremities   Skin:   Skin color, texture, turgor normal, no rashes or lesions   Lymph nodes:   Cervical, supraclavicular, and axillary nodes normal   Neurologic:   CNII-XII intact. Normal strength, sensation and reflexes       throughout                 "

## 2021-05-28 NOTE — TELEPHONE ENCOUNTER
Refill Approved    Rx renewed per Medication Renewal Policy. Medication was last renewed on 4/4/18.    Beth Victor, [facility] Triage/Med Refill 4/19/2019     Requested Prescriptions   Pending Prescriptions Disp Refills     metFORMIN (GLUCOPHAGE) 1000 MG tablet [Pharmacy Med Name: METFORMIN 1000MG TABLETS] 60 tablet 0     Sig: TAKE 1 TABLET BY MOUTH EVERY 12 HOURS       Metformin Refill Protocol Passed - 4/17/2019 12:40 PM        Passed - Blood pressure in last 12 months     BP Readings from Last 1 Encounters:   03/26/19 130/68             Passed - LFT or AST or ALT in last 12 months     Albumin   Date Value Ref Range Status   03/26/2019 4.1 3.5 - 5.0 g/dL Final     Bilirubin, Total   Date Value Ref Range Status   03/26/2019 0.4 0.0 - 1.0 mg/dL Final     Bilirubin, Direct   Date Value Ref Range Status   10/26/2010 0.2 <0.6 mg/dL Final     Alkaline Phosphatase   Date Value Ref Range Status   03/26/2019 65 45 - 120 U/L Final     AST   Date Value Ref Range Status   03/26/2019 20 0 - 40 U/L Final     ALT   Date Value Ref Range Status   03/26/2019 29 0 - 45 U/L Final     Protein, Total   Date Value Ref Range Status   03/26/2019 7.3 6.0 - 8.0 g/dL Final                Passed - GFR or Serum Creatinine in last 6 months     GFR MDRD Non Af Amer   Date Value Ref Range Status   03/26/2019 >60 >60 mL/min/1.73m2 Final     GFR MDRD Af Amer   Date Value Ref Range Status   03/26/2019 >60 >60 mL/min/1.73m2 Final             Passed - Visit with PCP or prescribing provider visit in last 6 months or next 3 months     Last office visit with prescriber/PCP: Visit date not found OR same dept: Visit date not found OR same specialty: 4/12/2018 Raquel Reddy CNP Last physical: 3/26/2019 Last MTM visit: Visit date not found         Next appt within 3 mo: Visit date not found  Next physical within 3 mo: Visit date not found  Prescriber OR PCP: Raquel Reddy CNP  Last diagnosis associated with med order: 1. Diabetes (H)  - metFORMIN  (GLUCOPHAGE) 1000 MG tablet [Pharmacy Med Name: METFORMIN 1000MG TABLETS]; TAKE 1 TABLET BY MOUTH EVERY 12 HOURS  Dispense: 60 tablet; Refill: 0     If protocol passes may refill for 12 months if within 3 months of last provider visit (or a total of 15 months).           Passed - A1C in last 6 months     Hemoglobin A1c   Date Value Ref Range Status   03/26/2019 9.7 (H) 3.5 - 6.0 % Final               Passed - Microalbumin in last year      Microalbumin, Random Urine   Date Value Ref Range Status   03/26/2019 6.94 (H) 0.00 - 1.99 mg/dL Final

## 2021-05-28 NOTE — TELEPHONE ENCOUNTER
Refill Approved    Rx renewed per Medication Renewal Policy. Medication was last renewed on 6/3/18.    Beth Victor, [facility] Triage/Med Refill 5/20/2019     Requested Prescriptions   Pending Prescriptions Disp Refills     pravastatin (PRAVACHOL) 40 MG tablet [Pharmacy Med Name: PRAVASTATIN 40MG TABLETS] 90 tablet 0     Sig: TAKE 1 TABLET(40 MG) BY MOUTH DAILY       Statins Refill Protocol (Hmg CoA Reductase Inhibitors) Passed - 5/18/2019  4:38 PM        Passed - PCP or prescribing provider visit in past 12 months      Last office visit with prescriber/PCP: 4/12/2018 Raquel Reddy CNP OR same dept: Visit date not found OR same specialty: 4/12/2018 Raquel Reddy CNP  Last physical: 3/26/2019 Last MTM visit: Visit date not found   Next visit within 3 mo: Visit date not found  Next physical within 3 mo: Visit date not found  Prescriber OR PCP: Raquel Reddy CNP  Last diagnosis associated with med order: 1. Hyperlipidemia  - pravastatin (PRAVACHOL) 40 MG tablet [Pharmacy Med Name: PRAVASTATIN 40MG TABLETS]; TAKE 1 TABLET(40 MG) BY MOUTH DAILY  Dispense: 90 tablet; Refill: 0    If protocol passes may refill for 12 months if within 3 months of last provider visit (or a total of 15 months).

## 2021-05-29 NOTE — PATIENT INSTRUCTIONS - HE
Recommendations from today's PharmD medication management visit                                                       1. Increase Xultphoy to 22 units daily.     2. [email or web address]    3. Occasionally check a two hours post prandial blood sugar (goal <180).     Next pharmacist visit: 1 week Faustino     My Pharmacist's contact information:                                                       It was a pleasure seeing you today to discuss your medications!  Please feel free to contact me with any questions or concerns you have. I look forward to seeing you again to help you get the most benefit from your medications!    To reschedule your PharmD appointment, please call the clinic directly or you may call the MTM scheduling line at [phone] or toll-free at [phone]:   [place] (available for appointments Mondays and Thursdays)  [facility] (available for appointments Tuesday, Wednesday & Friday)     Julieta Alas PharmD, BCACP  Medication Therapy Management Pharmacist  [facility]    You may receive a survey about the MTM services you received by email and/or US Mail.  I would appreciate your feedback to help me serve you better in the future. Your comments will be anonymous.

## 2021-05-29 NOTE — PROGRESS NOTES
MTM Initial Encounter  Assessment & Plan                                                     Type 2 Diabetes:  needs improvement. A1C not at goal of <7%. FBG at goal  mg/dL per reported value from this morning.  Patient did not have blood sugars, therefore unable to fully assess.  Reviewed that since his A1c was close to 10%, very likely his postprandial blood sugars are near 300.  Reviewed Xultophy and that it is a ratio of Tresiba and Victoza combined (1 unit = 1 unit Tresiba and 0.036 mg Victoza).  Her current doses are Tresiba 20 units and Victoza 0.72 mg.  He is on a very small amount of Victoza which is likely not therapeutic, and therefore needs higher dose to help with postprandial coverage.  Typical titration for Xultophy is increased 2 units every 3 to 4 days, therefore will increase by 2 units to 22 units daily and follow-up with him on my chart in a week with his blood sugars.  Recommended also checking 2-hour postprandial (goal less than 180).  Reviewed that due to long duration of action of Tresiba (42 hours) and once daily dosing of Victoza, pharmacokinetically there is no point in splitting the dose, therefore he will give all 22 units at once.  PLAN:   1. Increase Xultphoy to 22 units daily.   2. Occasionally check a two hours post prandial blood sugar (goal <180).     Follow Up  1 week my chart    Subjective & Objective                                                     Ced Fajardo is a 63 y.o. male coming in for an initial visit for Medication Therapy Management. He was referred to me from Raquel Reddy CNP for diabetes    Chief Complaint: Elevated blood sugars    Type 2 Diabetes: Currently taking Xultophy 10 units twice daily and metformin 1000 mg two times a day.  Was on glyburide before. No diarrhea.  Reports that Xultophy to 5 twice daily seems to work better for him, gives after breakfast and after dinner.  Was started by DME.  Has had diabetes for 10 years - dad had diabetes.    Tests BG 1 times daily fasting AM.  Did not bring blood sugars with him today.  Reports blood sugars: avg was 159, this morning was 113.   Last A1c checked 3/26/19 = 9.7%.   Hypoglycemia rarely, once was 88  Hyperglycemia symptoms: not really, maybe polyuria  Microalbumin checked 3/26/19. Is on lisinopril 5 mg daily.   Is taking pravastatin 40 mg daily. Last lipids checked 3/26/19  Is taking aspirin 81 mg for primary prevention.   Gets 10,000 steps in every day. Otherwise will walk a mile   [place]razes all day.  Eats meals through plated pre-meal mail service, with his wife they are trying to look for low-carb options.  Does not feel like he eats large portions for dinner since he is getting his meals mailed to him.  Wt Readings from Last 3 Encounters:   05/23/19 170 lb 9.6 oz (77.4 kg)   03/26/19 169 lb 8 oz (76.9 kg)   06/15/18 172 lb 14.4 oz (78.4 kg)       PMH: reviewed in EPIC   Allergies/ADRs: reviewed in EPIC   Alcohol: Did not discuss  Tobacco:   Social History     Tobacco Use   Smoking Status Never Smoker   Smokeless Tobacco Never Used     Today's Vitals:   Vitals:    05/23/19 1104   BP: 118/66   Pulse: 80   SpO2: 97%   Weight: 170 lb 9.6 oz (77.4 kg)     ----------------    Much or all of the text in this note was generated through the use of Dragon Dictate voice-to-text software. Errors in spelling or words which seem out of context are unintentional. Sound alike errors, in particular, may have escaped editing.    The patient was given a summary of these recommendations as an after visit summary    I spent 60 minutes with this patient today.   All changes were made via collaborative practice agreement with Raquel Reddy CNP. A copy of the visit note was provided to the patient's provider.     Jluieta Alas, PharmAllisonD., BCACP  Medication Therapy Management Pharmacist  [facility]     Current Outpatient Medications   Medication Sig Dispense Refill     aspirin 81 MG EC tablet Take 81 mg by  "mouth daily.       BD ULTRA-FINE PEDRITO PEN NEEDLE 32 gauge x 5/32\" Ndle USE ONCE DAILY 100 each 2     blood glucose test (GLUCOSE BLOOD) strips Use 1 each As Directed 3 (three) times a day. Accu-Chek Jessica In Vitro Strip - Use 1 strip 3 times daily 100 strip 5     insulin degludec-liraglutide (XULTOPHY 100/3.6) 100 unit-3.6 mg /mL (3 mL) InPn Inject 20 Units under the skin daily. 9 Syringe 2     LANCETS MISC Use As Directed. Accu-Chek MultiClix Lancets       lisinopril (PRINIVIL,ZESTRIL) 5 MG tablet TAKE 1 TABLET BY MOUTH EVERY DAY 90 tablet 0     metFORMIN (GLUCOPHAGE) 1000 MG tablet TAKE 1 TABLET BY MOUTH EVERY 12 HOURS 180 tablet 1     metFORMIN (GLUCOPHAGE) 1000 MG tablet TAKE 1 TABLET BY MOUTH EVERY 12 HOURS 180 tablet 3     multivitamin (ONE A DAY) per tablet Take 1 tablet by mouth daily.       pravastatin (PRAVACHOL) 40 MG tablet TAKE 1 TABLET(40 MG) BY MOUTH DAILY 90 tablet 2     sildenafil (VIAGRA) 50 MG tablet Take 0.5-1 tablets (25-50 mg total) by mouth daily as needed for erectile dysfunction. 6 tablet 5     No current facility-administered medications for this visit.              "

## 2021-05-29 NOTE — TELEPHONE ENCOUNTER
Refill Approved    Rx renewed per Medication Renewal Policy. Medication was last renewed on 3/16/19.    Beth Victor, [facility] Triage/Med Refill 6/17/2019     Requested Prescriptions   Pending Prescriptions Disp Refills     lisinopril (PRINIVIL,ZESTRIL) 5 MG tablet [Pharmacy Med Name: LISINOPRIL 5MG TABLETS] 90 tablet 0     Sig: TAKE 1 TABLET BY MOUTH EVERY DAY       Ace Inhibitors Refill Protocol Passed - 6/15/2019  1:40 PM        Passed - PCP or prescribing provider visit in past 12 months       Last office visit with prescriber/PCP: 4/12/2018 Raquel Reddy CNP OR same dept: Visit date not found OR same specialty: 4/12/2018 Raquel Reddy CNP  Last physical: 3/26/2019 Last MTM visit: Visit date not found   Next visit within 3 mo: Visit date not found  Next physical within 3 mo: Visit date not found  Prescriber OR PCP: Raquel Reddy CNP  Last diagnosis associated with med order: 1. Essential hypertension  - lisinopril (PRINIVIL,ZESTRIL) 5 MG tablet [Pharmacy Med Name: LISINOPRIL 5MG TABLETS]; TAKE 1 TABLET BY MOUTH EVERY DAY  Dispense: 90 tablet; Refill: 0    If protocol passes may refill for 12 months if within 3 months of last provider visit (or a total of 15 months).             Passed - Serum Potassium in past 12 months     Lab Results   Component Value Date    Potassium 4.3 03/26/2019             Passed - Blood pressure filed in past 12 months     BP Readings from Last 1 Encounters:   05/23/19 118/66             Passed - Serum Creatinine in past 12 months     Creatinine   Date Value Ref Range Status   03/26/2019 0.92 0.70 - 1.30 mg/dL Final

## 2021-05-30 VITALS — BODY MASS INDEX: 27.19 KG/M2 | HEIGHT: 68 IN | WEIGHT: 179.4 LBS

## 2021-05-30 NOTE — PROGRESS NOTES
Assessment and Plan:     1. Type 2 diabetes mellitus (H)  Glycosylated Hemoglobin A1c   2. Hypertension     3. Dyslipidemia     4. Colon cancer screening  Cologuard     Patient's A1c has improved 8.6% with 1 month of the dosing adjustment of Xultophy.  He will continue Xultophy and metformin as prescribed.  He will continue to monitor his blood sugars.  Blood pressure is well controlled with Lisinopril.  LDL is at goal with Pravastatin.  He will follow-up in 3 months for a diabetes check.  He is content with the plan.     Subjective:     Ced is a 63 y.o. male presenting to the clinic for a diabetes check.  Patient has multiple comorbidities including type 2 diabetes, hypertension, dyslipidemia, microalbuminuria.  Last hemoglobin A1c was 9.7% on 3/26/2019.  He recently consulted with our [place] pharmacist who increased Xultophy to 22 units daily.  He is taking metformin 1000 mg twice daily.  She was concerned of elevated postprandial readings.  He is taking pravastatin 40 mg daily for dyslipidemia.  Last cholesterol check was on 3/26/2019 with a total cholesterol 145, triglycerides 139, HDL 53, LDL 64.  Hypertension is controlled lisinopril.  He denies chest pain, shortness of breath with exertion, edema, orthopnea, syncope.  Patient presents today stating typically checks his blood sugars fasting in the morning.  His 30-day average is 123.  3 weeks ago, he did check a blood sugar 2 hours after eating it was 280.  He is consuming a healthy diet.  He and his wife buy prepackaged meals from home .  He is eating mostly salads and fish.  Overall, he feels well today.    Review of Systems: A complete 14 point review of systems was obtained and is negative or as stated in the history of present illness.    Social History     Socioeconomic History     Marital status:      Spouse name: Not on file     Number of children: Not on file     Years of education: Not on file     Highest education level: Not on file  "  Occupational History     Not on file   Social Needs     Financial resource strain: Not on file     Food insecurity:     Worry: Not on file     Inability: Not on file     Transportation needs:     Medical: Not on file     Non-medical: Not on file   Tobacco Use     Smoking status: Never Smoker     Smokeless tobacco: Never Used   Substance and Sexual Activity     Alcohol use: Yes     Comment: Very little     Drug use: No     Sexual activity: Yes     Partners: Female     Comment:    Lifestyle     Physical activity:     Days per week: Not on file     Minutes per session: Not on file     Stress: Not on file   Relationships     Social connections:     Talks on phone: Not on file     Gets together: Not on file     Attends [employer] service: Not on file     Active member of club or organization: Not on file     Attends meetings of clubs or organizations: Not on file     Relationship status: Not on file     Intimate partner violence:     Fear of current or ex partner: Not on file     Emotionally abused: Not on file     Physically abused: Not on file     Forced sexual activity: Not on file   Other Topics Concern     Not on file   Social History Narrative     Not on file       Active Ambulatory Problems     Diagnosis Date Noted     Microalbuminuria      Hypertension      Type 2 diabetes mellitus with hyperglycemia, without long-term current use of insulin (H)      Dyslipidemia 08/26/2016     Resolved Ambulatory Problems     Diagnosis Date Noted     Hyperlipidemia      Chest Pain      Acute Upper Respiratory Infection      Elevated liver enzymes 05/28/2017     Past Medical History:   Diagnosis Date     Diabetes mellitus (H)        Family History   Problem Relation Age of Onset     Breast cancer Mother      Diabetes Father        Objective:     /66   Pulse 80   Ht 5' 6.5\" (1.689 m)   Wt 165 lb (74.8 kg)   SpO2 97%   BMI 26.23 kg/m      Patient is alert, in no obvious distress.   Skin: Warm, dry.  No lesions or " rashes.  Skin turgor rapid return.   HEENT:  Head normocephalic, atraumatic.  Eyes normal. Ears normal.  Nose patent, mucosa pink.  Oropharynx mucosa pink.  No lesions or tonsillar enlargement.   Neck: Supple, no lymphadenopathy.   Lungs:  Clear to auscultation. Respirations even and unlabored.  No wheezing or rales noted.   Heart:  Regular rate and rhythm.  No murmurs.   Musculoskeletal:  No edema present in bilateral lower extremities.

## 2021-05-30 NOTE — TELEPHONE ENCOUNTER
"RN cannot approve Refill Request    RN can NOT refill this medication Protocol failed and RN gave 3 month refill     . Last office visit: 6/28/2019 Raquel Reddy, CNP Last Physical: 3/26/2019 Last MTM visit: Visit date not found Last visit same specialty: Visit date not found.  Next visit within 3 mo: Visit date not found  Next physical within 3 mo: Visit date not found      Beth Victor, [facility] Triage/Med Refill 7/22/2019    Requested Prescriptions   Pending Prescriptions Disp Refills     BD ULTRA-FINE PEDRITO PEN NEEDLE 32 gauge x 5/32\" Ndle [Pharmacy Med Name: B-D PEN NDL PEDRITO [id number](5/32) GRN] 100 each 0     Sig: USE ONCE DAILY       There is no refill protocol information for this order           "

## 2021-05-31 VITALS — BODY MASS INDEX: 25.36 KG/M2 | HEIGHT: 69 IN | WEIGHT: 171.2 LBS

## 2021-05-31 VITALS — HEIGHT: 67 IN | WEIGHT: 178 LBS | BODY MASS INDEX: 27.94 KG/M2

## 2021-05-31 VITALS — BODY MASS INDEX: 27.14 KG/M2 | WEIGHT: 172.9 LBS | HEIGHT: 67 IN

## 2021-05-31 VITALS — WEIGHT: 176.5 LBS | BODY MASS INDEX: 28.06 KG/M2

## 2021-05-31 VITALS — BODY MASS INDEX: 25.48 KG/M2 | HEIGHT: 69 IN | WEIGHT: 172 LBS

## 2021-05-31 VITALS — WEIGHT: 175 LBS | BODY MASS INDEX: 26.61 KG/M2

## 2021-06-01 VITALS — WEIGHT: 180 LBS | BODY MASS INDEX: 28.25 KG/M2 | HEIGHT: 67 IN

## 2021-06-01 VITALS — WEIGHT: 172.9 LBS | BODY MASS INDEX: 27.14 KG/M2 | HEIGHT: 67 IN

## 2021-06-01 VITALS — BODY MASS INDEX: 27.9 KG/M2 | WEIGHT: 175.5 LBS

## 2021-06-02 VITALS — WEIGHT: 169.5 LBS | HEIGHT: 67 IN | BODY MASS INDEX: 26.6 KG/M2

## 2021-06-02 NOTE — PROGRESS NOTES
Assessment and Plan:     1. Type 2 diabetes mellitus with hyperglycemia, without long-term current use of insulin (H)  Glycosylated Hemoglobin A1c   2. Hypertension     3. Dyslipidemia       Patient continues Xultophy and metformin as prescribed.  Will check A1c notify patient of results.  Blood pressure is controlled with lisinopril.  He continues pravastatin 40 mg daily for dyslipidemia.  He is to follow-up in 3 months for diabetes check.  He is content with the plan.    Subjective:     Ced is a 63 y.o. male presenting to the clinic for medication management.  Patient has type 2 diabetes, dyslipidemia, hypertension.  Patient is injecting Xultophy 22 units daily.  He is taking metformin 1000 mg twice daily.  Patient is consuming a healthy diet and walks 5 miles daily for exercise.  Last hemoglobin A1c was 8.6% in 6/28/2019.  Patient's average fasting blood sugar for the past month has been 128.  He is taking pravastatin 40 mg daily for the dyslipidemia.  Last cholesterol check was on 3/26/2019 with a total cholesterol 145, triglycerides 139, HDL 53, LDL 64.  He denies chest pain, shortness of breath with exertion, edema, orthopnea, syncope.  He is due for an eye exam in December.  Blood pressure is controlled with lisinopril.    Review of Systems: A complete 14 point review of systems was obtained and is negative or as stated in the history of present illness.    Social History     Socioeconomic History     Marital status:      Spouse name: Not on file     Number of children: Not on file     Years of education: Not on file     Highest education level: Not on file   Occupational History     Not on file   Social Needs     Financial resource strain: Not on file     Food insecurity:     Worry: Not on file     Inability: Not on file     Transportation needs:     Medical: Not on file     Non-medical: Not on file   Tobacco Use     Smoking status: Never Smoker     Smokeless tobacco: Never Used   Substance and  "Sexual Activity     Alcohol use: Yes     Comment: Very little     Drug use: No     Sexual activity: Yes     Partners: Female     Comment:    Lifestyle     Physical activity:     Days per week: Not on file     Minutes per session: Not on file     Stress: Not on file   Relationships     Social connections:     Talks on phone: Not on file     Gets together: Not on file     Attends Congregation service: Not on file     Active member of club or organization: Not on file     Attends meetings of clubs or organizations: Not on file     Relationship status: Not on file     Intimate partner violence:     Fear of current or ex partner: Not on file     Emotionally abused: Not on file     Physically abused: Not on file     Forced sexual activity: Not on file   Other Topics Concern     Not on file   Social History Narrative     Not on file       Active Ambulatory Problems     Diagnosis Date Noted     Microalbuminuria      Hypertension      Type 2 diabetes mellitus with hyperglycemia, without long-term current use of insulin (H)      Dyslipidemia 08/26/2016     Resolved Ambulatory Problems     Diagnosis Date Noted     Hyperlipidemia      Chest Pain      Acute Upper Respiratory Infection      Elevated liver enzymes 05/28/2017     Past Medical History:   Diagnosis Date     Diabetes mellitus (H)        Family History   Problem Relation Age of Onset     Breast cancer Mother      Diabetes Father        Objective:     /64   Pulse 85   Ht 5' 6.5\" (1.689 m)   Wt 164 lb 12.8 oz (74.8 kg)   SpO2 97%   BMI 26.20 kg/m      Patient is alert, in no obvious distress.   Skin: Warm, dry.  No lesions or rashes.  Skin turgor rapid return.   HEENT:  Head normocephalic, atraumatic.  Eyes normal. Ears normal.  Nose patent, mucosa pink.  Oropharynx mucosa pink.  No lesions or tonsillar enlargement.   Neck: Supple, no lymphadenopathy. No thyromegaly.  Lungs:  Clear to auscultation. Respirations even and unlabored.  No wheezing or rales " noted.   Heart:  Regular rate and rhythm.  No murmurs, S3, S4, gallops, or rubs.    Abdomen: Soft, nontender.  No organomegaly. Bowel sounds normoactive. No guarding or masses noted.

## 2021-06-03 VITALS
WEIGHT: 164.8 LBS | SYSTOLIC BLOOD PRESSURE: 104 MMHG | DIASTOLIC BLOOD PRESSURE: 64 MMHG | BODY MASS INDEX: 25.87 KG/M2 | HEIGHT: 67 IN | OXYGEN SATURATION: 97 % | HEART RATE: 85 BPM

## 2021-06-03 VITALS — BODY MASS INDEX: 27.12 KG/M2 | WEIGHT: 170.6 LBS

## 2021-06-03 VITALS — BODY MASS INDEX: 25.9 KG/M2 | HEIGHT: 67 IN | WEIGHT: 165 LBS

## 2021-06-05 VITALS
WEIGHT: 171.2 LBS | BODY MASS INDEX: 26.4 KG/M2 | SYSTOLIC BLOOD PRESSURE: 120 MMHG | OXYGEN SATURATION: 95 % | HEART RATE: 102 BPM | DIASTOLIC BLOOD PRESSURE: 60 MMHG

## 2021-06-05 VITALS
HEIGHT: 68 IN | HEART RATE: 84 BPM | SYSTOLIC BLOOD PRESSURE: 132 MMHG | WEIGHT: 173.1 LBS | BODY MASS INDEX: 26.24 KG/M2 | OXYGEN SATURATION: 97 % | DIASTOLIC BLOOD PRESSURE: 68 MMHG

## 2021-06-05 NOTE — TELEPHONE ENCOUNTER
Refill Approved    Rx renewed per Medication Renewal Policy. Medication was last renewed on 5/20/19.    Beth Vcitor, [facility] Triage/Med Refill 2/4/2020     Requested Prescriptions   Pending Prescriptions Disp Refills     pravastatin (PRAVACHOL) 40 MG tablet [Pharmacy Med Name: PRAVASTATIN 40MG TABLETS] 90 tablet 2     Sig: TAKE 1 TABLET(40 MG) BY MOUTH DAILY       Statins Refill Protocol (Hmg CoA Reductase Inhibitors) Passed - 2/4/2020  5:27 AM        Passed - PCP or prescribing provider visit in past 12 months      Last office visit with prescriber/PCP: 10/11/2019 Raquel Reddy CNP OR same dept: 10/11/2019 Raquel Reddy CNP OR same specialty: 10/11/2019 Raquel Reddy CNP  Last physical: 3/26/2019 Last MTM visit: Visit date not found   Next visit within 3 mo: Visit date not found  Next physical within 3 mo: Visit date not found  Prescriber OR PCP: Raquel Reddy CNP  Last diagnosis associated with med order: 1. Hyperlipidemia  - pravastatin (PRAVACHOL) 40 MG tablet [Pharmacy Med Name: PRAVASTATIN 40MG TABLETS]; TAKE 1 TABLET(40 MG) BY MOUTH DAILY  Dispense: 90 tablet; Refill: 2    If protocol passes may refill for 12 months if within 3 months of last provider visit (or a total of 15 months).

## 2021-06-05 NOTE — TELEPHONE ENCOUNTER
RN cannot approve Refill Request    RN can NOT refill this medication med is not covered by policy/route to provider. Last office visit: 10/11/2019 Raquel Reddy, CNP Last Physical: 3/26/2019 Last MTM visit: 5/23/2019 Julieta Alas, PharmD Last visit same specialty: Visit date not found.  Next visit within 3 mo: Visit date not found  Next physical within 3 mo: Visit date not found      Citlalli Garsia, [facility] Triage/Med Refill 1/7/2020    Requested Prescriptions   Pending Prescriptions Disp Refills     XULTOPHY 100/3.6 100 unit-3.6 mg /mL (3 mL) InPn [Pharmacy Med Name: XULTOPHY 100/3.6 PEN INJ 3ML] 15 mL 2     Sig: INJECT 22 UNITS SUBCUTANEOUS DAILY       There is no refill protocol information for this order

## 2021-06-06 NOTE — TELEPHONE ENCOUNTER
Refill Approved    Rx renewed per Medication Renewal Policy. Medication was last renewed on 6/17/19.    Beth Victor, [facility] Triage/Med Refill 3/2/2020     Requested Prescriptions   Pending Prescriptions Disp Refills     lisinopriL (PRINIVIL,ZESTRIL) 5 MG tablet [Pharmacy Med Name: LISINOPRIL 5MG TABLETS] 90 tablet 2     Sig: TAKE 1 TABLET BY MOUTH EVERY DAY       Ace Inhibitors Refill Protocol Passed - 3/2/2020  5:28 AM        Passed - PCP or prescribing provider visit in past 12 months       Last office visit with prescriber/PCP: 10/11/2019 Raquel Reddy CNP OR same dept: 10/11/2019 Raquel Reddy CNP OR same specialty: 10/11/2019 Raquel Reddy CNP  Last physical: 3/26/2019 Last MTM visit: Visit date not found   Next visit within 3 mo: Visit date not found  Next physical within 3 mo: Visit date not found  Prescriber OR PCP: Raquel Reddy CNP  Last diagnosis associated with med order: 1. Essential hypertension  - lisinopriL (PRINIVIL,ZESTRIL) 5 MG tablet [Pharmacy Med Name: LISINOPRIL 5MG TABLETS]; TAKE 1 TABLET BY MOUTH EVERY DAY  Dispense: 90 tablet; Refill: 2    If protocol passes may refill for 12 months if within 3 months of last provider visit (or a total of 15 months).             Passed - Serum Potassium in past 12 months     Lab Results   Component Value Date    Potassium 4.3 03/26/2019             Passed - Blood pressure filed in past 12 months     BP Readings from Last 1 Encounters:   10/11/19 104/64             Passed - Serum Creatinine in past 12 months     Creatinine   Date Value Ref Range Status   03/26/2019 0.92 0.70 - 1.30 mg/dL Final

## 2021-06-07 NOTE — PROGRESS NOTES
"Ced Fajardo is a 64 y.o. male who is being evaluated via a billable telephone visit.      The patient has been notified of following:     \"This telephone visit will be conducted via a call between you and your physician/provider. We have found that certain health care needs can be provided without the need for a physical exam.  This service lets us provide the care you need with a short phone conversation.  If a prescription is necessary we can send it directly to your pharmacy.  If lab work is needed we can place an order for that and you can then stop by our lab to have the test done at a later time.    If during the course of the call the physician/provider feels a telephone visit is not appropriate, you will not be charged for this service.\"     Patient has given verbal consent to a Telephone visit? Yes    Ced Fajardo complains of    Chief Complaint   Patient presents with     Diabetes       I have reviewed and updated the patient's Past Medical History, Social History, Family History and Medication List.    ALLERGIES  Patient has no known allergies.    Additional provider notes:   Assessment and Plan:     1. Type 2 diabetes mellitus with hyperglycemia, with long-term current use of insulin (H)     2. Dyslipidemia     3. Hypertension       Patient's diabetes was uncontrolled with last A1c of 8% on 10/11/2019.  I encouraged him to increase his protein intake with the vegetarian diet to assist with controlling his blood sugars.  Patient continues his medications as prescribed.  He is up-to-date on an eye exam.  He continues pravastatin for dyslipidemia.  Blood pressure has been well controlled with lisinopril.  I encouraged him to follow-up for diabetes check once a coronavirus improves.  He is content with the plan.    Subjective:     Ced is a 64 y.o. male presenting for a virtual visit.  Patient has multiple comorbidities including type 2 diabetes, hypertension, dyslipidemia, and " microalbuminuria.  Patient is injecting Xultophy 22 units daily and taking metformin 1000 mg twice daily.  He does check his blood sugar fasting in the morning.  His blood sugar typically ranges .  He has noticed more elevated readings since working from home.  Lately, it has been ranging 140-150.  He is now consuming primarily a vegetarian diet, but does occasionally eat meat.  He has increased his activity level.  His last hemoglobin A1c was 8% on 10/11/2019.  He takes pravastatin for hyperlipidemia.  He denies chest pain, shortness of breath with exertion, edema, orthopnea, syncope.  He is taking lisinopril for hypertension.  He has not been monitoring his blood pressure at home.  His last eye exam was in December.    Review of Systems: A complete 14 point review of systems was obtained and is negative or as stated in the history of present illness.    Social History     Socioeconomic History     Marital status:      Spouse name: Not on file     Number of children: Not on file     Years of education: Not on file     Highest education level: Not on file   Occupational History     Not on file   Social Needs     Financial resource strain: Not on file     Food insecurity     Worry: Not on file     Inability: Not on file     Transportation needs     Medical: Not on file     Non-medical: Not on file   Tobacco Use     Smoking status: Never Smoker     Smokeless tobacco: Never Used   Substance and Sexual Activity     Alcohol use: Yes     Comment: Very little     Drug use: No     Sexual activity: Yes     Partners: Female     Comment:    Lifestyle     Physical activity     Days per week: Not on file     Minutes per session: Not on file     Stress: Not on file   Relationships     Social connections     Talks on phone: Not on file     Gets together: Not on file     Attends [employer] service: Not on file     Active member of club or organization: Not on file     Attends meetings of clubs or organizations:  Not on file     Relationship status: Not on file     Intimate partner violence     Fear of current or ex partner: Not on file     Emotionally abused: Not on file     Physically abused: Not on file     Forced sexual activity: Not on file   Other Topics Concern     Not on file   Social History Narrative     Not on file       Active Ambulatory Problems     Diagnosis Date Noted     Microalbuminuria      Hypertension      Type 2 diabetes mellitus with hyperglycemia, without long-term current use of insulin (H)      Dyslipidemia 08/26/2016     Resolved Ambulatory Problems     Diagnosis Date Noted     Hyperlipidemia      Chest Pain      Acute Upper Respiratory Infection      Elevated liver enzymes 05/28/2017     Past Medical History:   Diagnosis Date     Diabetes mellitus (H)        Family History   Problem Relation Age of Onset     Breast cancer Mother      Diabetes Father        Objective:     There were no vitals taken for this visit.    Patient is alert, speaking clearly.   Exam not performed due to virtual visit.         Phone call duration:  13 minutes    Tamara Padilla CMA

## 2021-06-07 NOTE — TELEPHONE ENCOUNTER
Called pt. And left a voicemail about setting up a (20 min) telephone visit w/Raquel Reddy for a DM check.    Thanks

## 2021-06-09 NOTE — TELEPHONE ENCOUNTER
RN cannot approve Refill Request    RN can NOT refill this medication Protocol failed and NO refill given. Last office visit: 10/11/2019 Raquel Reddy CNP Last Physical: 3/26/2019 Last MTM visit: Visit date not found Last visit same specialty: 10/11/2019 Raquel Reddy CNP.  Next visit within 3 mo: Visit date not found  Next physical within 3 mo: Visit date not found      Beth Victor, [facility] Triage/Med Refill 7/13/2020    Requested Prescriptions   Pending Prescriptions Disp Refills     XULTOPHY 100/3.6 100 unit-3.6 mg /mL (3 mL) InPn [Pharmacy Med Name: XULTOPHY 100/3.6 PEN INJ 3ML] 15 mL 2     Sig: INJECT 22 UNITS UNDER THE SKIN ONCE DAILY       Insulin/GLP-1 Refill Protocol Failed - 7/13/2020 11:53 AM        Failed - Visit with PCP or prescribing provider visit in last 6 months     Last office visit with prescriber/PCP: Visit date not found OR same dept: 10/11/2019 Raquel Reddy CNP OR same specialty: 10/11/2019 Raquel Reddy CNP Last physical: Visit date not found Last MTM visit: Visit date not found     Next appt within 3 mo: Visit date not found  Next physical within 3 mo: Visit date not found  Prescriber OR PCP: Raquel Reddy CNP  Last diagnosis associated with med order: 1. Type 2 diabetes mellitus with hyperglycemia, with long-term current use of insulin (H)  - XULTOPHY 100/3.6 100 unit-3.6 mg /mL (3 mL) InPn [Pharmacy Med Name: XULTOPHY 100/3.6 PEN INJ 3ML]; INJECT 22 UNITS UNDER THE SKIN ONCE DAILY  Dispense: 15 mL; Refill: 2    If protocol passes may refill for 6 months if within 3 months of last provider visit (or a total of 9 months).              Failed - A1C in last 6 months     Hemoglobin A1c   Date Value Ref Range Status   10/11/2019 8.0 (H) 3.5 - 6.0 % Final               Failed - Microalbumin in last year     Microalbumin, Random Urine   Date Value Ref Range Status   03/26/2019 6.94 (H) 0.00 - 1.99 mg/dL Final                  Failed - Creatinine done in last year     Creatinine    Date Value Ref Range Status   03/26/2019 0.92 0.70 - 1.30 mg/dL Final             Passed - Blood pressure in last year     BP Readings from Last 1 Encounters:   10/11/19 104/64

## 2021-06-10 NOTE — TELEPHONE ENCOUNTER
RN cannot approve Refill Request    RN can NOT refill this medication Protocol failed and NO refill given. Last office visit: 10/11/2019 Raquel Reddy CNP Last Physical: 3/26/2019 Last MTM visit: Visit date not found Last visit same specialty: 10/11/2019 Raquel Reddy CNP.  Next visit within 3 mo: Visit date not found  Next physical within 3 mo: Visit date not found      Beth Victor, [facility] Triage/Med Refill 8/14/2020    Requested Prescriptions   Pending Prescriptions Disp Refills     lisinopriL (PRINIVIL,ZESTRIL) 5 MG tablet 90 tablet 0     Sig: Take 1 tablet (5 mg total) by mouth daily.       Ace Inhibitors Refill Protocol Failed - 8/14/2020  1:17 PM        Failed - Serum Potassium in past 12 months     No results found for: LN-POTASSIUM          Failed - Serum Creatinine in past 12 months     Creatinine   Date Value Ref Range Status   03/26/2019 0.92 0.70 - 1.30 mg/dL Final             Passed - PCP or prescribing provider visit in past 12 months       Last office visit with prescriber/PCP: 10/11/2019 Raquel Reddy CNP OR same dept: 10/11/2019 Raquel Reddy CNP OR same specialty: 10/11/2019 Raquel Reddy CNP  Last physical: 3/26/2019 Last MTM visit: Visit date not found   Next visit within 3 mo: Visit date not found  Next physical within 3 mo: Visit date not found  Prescriber OR PCP: Raquel Reddy CNP  Last diagnosis associated with med order: 1. Essential hypertension  - lisinopriL (PRINIVIL,ZESTRIL) 5 MG tablet; Take 1 tablet (5 mg total) by mouth daily.  Dispense: 90 tablet; Refill: 0    If protocol passes may refill for 12 months if within 3 months of last provider visit (or a total of 15 months).             Passed - Blood pressure filed in past 12 months     BP Readings from Last 1 Encounters:   10/11/19 104/64

## 2021-06-10 NOTE — PROGRESS NOTES
Assessment and Plan:     1. Type 2 diabetes mellitus  Glycosylated Hemoglobin A1c    Glycosylated Hemoglobin A1c    Microalbumin, Random Urine    Ambulatory referral to Endocrinology    Ambulatory referral to Diabetes Education (Existing Diagnosis)   2. Essential hypertension with goal blood pressure less than 130/80     3. Dyslipidemia  Lipid Cascade   4. Microalbuminuria     5. Medication management  Comprehensive Metabolic Panel   6. Prostate cancer screening  PSA (Prostatic-Specific Antigen), Annual Screen     Patient's hemoglobin A1c is 9.8% today.  He is taking the maximum dose of his oral hypoglycemics.  Will refer to diabetes and Endocrinology for possible insulin.  Will check lipid [place] and notify patient.  He continues pravastatin.  We will recheck liver function.  Discussed shingles vaccine, but he would like to see if his insurance covers it.  He is up-to-date on colonoscopy.    Subjective:     Ced is a 61 y.o. male presenting to the clinic for medication management.  Patient is taking glyburide 10 mg twice daily.  Metformin 1000 mg twice daily, and Tradjenta 5 mg daily.  He is consuming a healthy diet.  He walks for exercise.  His fasting blood sugars have been ranging 140-170.  His last eye exam was December 2016.  He denies any sores on his feet.  His last hemoglobin A1C is 7.7% on 8/26/16. He feels as though he is doing everything right with his diabetes.  He has hyperlipidemia and is taking pravastatin 40 mg daily. Last cholesterol check was on 10/2/15 with a total cholesterol of 191, triglycerides 104, HDL 46 and .  His ALT was 77 on 8/26/16.  He is feeling well and has no concerns today.    Review of Systems: A complete 14 point review of systems was obtained and is negative or as stated in the history of present illness.    Social History     Social History     Marital status:      Spouse name: N/A     Number of children: N/A     Years of education: N/A     Occupational  "History     Not on file.     Social History Main Topics     Smoking status: Never Smoker     Smokeless tobacco: Never Used     Alcohol use Yes      Comment: Very little     Drug use: No     Sexual activity: Yes     Partners: Female      Comment:      Other Topics Concern     Not on file     Social History Narrative       Active Ambulatory Problems     Diagnosis Date Noted     Microalbuminuria      Hypertension      Chest Pain      Type 2 Diabetes Mellitus      Dyslipidemia 08/26/2016     Resolved Ambulatory Problems     Diagnosis Date Noted     Hyperlipidemia      Acute Upper Respiratory Infection      Past Medical History:   Diagnosis Date     Diabetes mellitus        Family History   Problem Relation Age of Onset     Breast cancer Mother      Diabetes Father        Objective:     /70 (Patient Site: Right Arm, Patient Position: Sitting, Cuff Size: Adult Regular)  Pulse 86  Resp 18  Ht 5' 8\" (1.727 m)  Wt 179 lb 6.4 oz (81.4 kg)  SpO2 96%  BMI 27.28 kg/m2    Patient is alert, in no obvious distress.   Skin: Warm, dry.    Lungs:  Clear to auscultation. Respirations even and unlabored.  No wheezing or rales noted.   Heart:  Regular rate and rhythm.  No murmurs, S3, S4, gallops, or rubs.    Abdomen: Soft, nontender.  No organomegaly. Bowel sounds normoactive. No guarding or masses noted.   Musculoskeletal: Monofilament testing is normal bilaterally.               "

## 2021-06-11 NOTE — PROGRESS NOTES
[facility]  ENDOCRINOLOGY    Diabetes Note 7/5/2017    Ced Fajardo, 1955, [id number]          Reason for visit      1. Type 2 diabetes mellitus    2. Microalbuminuria    3. Essential hypertension        HPI     Ced Fajardo is a very pleasant 61 y.o. old male who presents for follow up.  SUMMARY:  Efrain is here today in referral from his PCP, Raquel Reddy, for uncontrolled DM 2.  He had a consultation with TON Acuna and has really implemented everything that they discussed.  He and his wife have been using the Mediterranean diet for their main meal and snacking choices.  He has changed his medication regimen by taking the Glyburide before meals, and he feels that his has been helping.  He is testing first thing in the morning and tells me that his numbers are running between 90 and 120.  He did not bring in his meter this morning, unfortunately.  He also takes Metformin and Tradjenta daily.  He states that both his father and father in law are diabetics and he has seen the good, the bad and the ugly.  He walks regularly, and puts in quite a few steps while at work.  Fortunately for him, he is on the slim side and doesn't have the typical habitus of a Type 2 DM.  He is on and ACE inhibitor as well as a Statin.    Continues with mild microalbuminuria. Better BG should help with this.      He is currently at goal.  BP today is 116/64        Past Medical History     Patient Active Problem List   Diagnosis     Microalbuminuria     Hypertension     Chest Pain     Type 2 diabetes mellitus     Dyslipidemia     Elevated liver enzymes        Family History       family history includes Breast cancer in his mother; Diabetes in his father.    Social History      reports that he has never smoked. He has never used smokeless tobacco. He reports that he drinks alcohol. He reports that he does not use illicit drugs.      Review of Systems     Patient has no polyuria or polydipsia, no chest pain, dyspnea or  "TIA's, no numbness, tingling or pain in extremities  Remainder negative except as noted in HPI.    Vital Signs     /64 (Patient Site: Right Arm, Patient Position: Sitting, Cuff Size: Adult Regular)  Pulse 78  Ht 5' 9\" (1.753 m)  Wt 171 lb 3.2 oz (77.7 kg)  BMI 25.28 kg/m2  Wt Readings from Last 3 Encounters:   07/05/17 171 lb 3.2 oz (77.7 kg)   06/13/17 175 lb (79.4 kg)   06/13/17 172 lb (78 kg)       Physical Exam     Constitutional:  Well developed, Well nourished  HENT:  Normocephalic,   Neck: Thyroid normal, No lymph nodes, Supple  Eyes:  PERRL, Conjunctiva pink  Respiratory:  Normal breath sounds, No respiratory distress  Cardiovascular:  Normal heart rate, Normal rhythm, No murmurs  GI:  Bowel sounds normal, Soft, No tenderness  Musculoskeletal:  No gross deformity or lesions, normal dorsalis pedis pulses  Skin: No acanthosis nigricans, lipoatrophy or lipodystrophy  Neurologic:  Alert & oriented x 3, nonfocal  Psychiatric:  Affect, Mood, Insight appropriate  Diabetic foot exam: no ulcers, charcot's or high risk calluses, Normal monofilament exam          Assessment     1. Type 2 diabetes mellitus    2. Microalbuminuria    3. Essential hypertension        Plan     Efrain has really stepped up and applied all of the suggestions that were given him.  His next A1c will likely reflect a big change.  Hopefully he will be able to maintain this momentum and continue even through the winter, which he states is the hard time of the year for him.  No changes to his medications.    Continue with ACE Inhibitor    Time spent with pt today: 30 min with >50% spent in counseling and coordination of care.        Catherine VELÁSQUEZ Endocrinology  7/5/2017  7:49 AM        Lab Results     Hemoglobin A1c   Date Value Ref Range Status   05/12/2017 9.8 (H) 3.5 - 6.0 % Final   08/26/2016 7.7 (H) 3.5 - 6.0 % Corrected     Comment:     This is a corrected result. Previous result was 3.5 % on 8/26/2016 at 0855 CDT   04/20/2016 " 9.5 (H) 3.5 - 6.0 % Final   09/29/2015 7.3 (H) 3.5 - 6.0 % Final   02/26/2015 6.6 (H) 3.5 - 6.0 % Final     Creatinine   Date Value Ref Range Status   05/12/2017 0.96 0.70 - 1.30 mg/dL Final   08/26/2016 0.94 0.70 - 1.30 mg/dL Final   09/29/2015 0.96 0.70 - 1.30 mg/dL Final     Microalbumin, Random Urine   Date Value Ref Range Status   05/12/2017 10.71 (H) 0.00 - 1.99 mg/dL Final       Cholesterol   Date Value Ref Range Status   05/12/2017 162 <=199 mg/dL Final     HDL Cholesterol   Date Value Ref Range Status   05/12/2017 43 >=40 mg/dL Final     LDL Calculated   Date Value Ref Range Status   05/12/2017 80 <=129 mg/dL Final     Triglycerides   Date Value Ref Range Status   05/12/2017 193 (H) <=149 mg/dL Final       Lab Results   Component Value Date    ALT 88 (H) 05/12/2017    AST 48 (H) 05/12/2017    ALKPHOS 66 05/12/2017    BILITOT 0.6 05/12/2017         Current Medications     Outpatient Medications Prior to Visit   Medication Sig Dispense Refill     aspirin 81 MG EC tablet Take 81 mg by mouth daily.       blood sugar diagnostic (GLUCOSE BLOOD) Strp Accu-Chek Jessica In Vitro Strip - Use 1 strip 3 times daily       glyBURIDE (DIABETA) 5 MG tablet TAKE 2 TABLETS BY MOUTH TWICE DAILY 360 tablet 3     LANCETS MISC Use As Directed. Accu-Chek MultiClix Lancets       lisinopril (PRINIVIL,ZESTRIL) 5 MG tablet TAKE 1 TABLET BY MOUTH EVERY DAY 90 tablet 2     metFORMIN (GLUCOPHAGE) 1000 MG tablet TAKE 1 TABLET BY MOUTH EVERY 12 HOURS 180 tablet 3     multivitamin (ONE A DAY) per tablet Take 1 tablet by mouth daily.       pravastatin (PRAVACHOL) 40 MG tablet TAKE 1 TABLET BY MOUTH EVERY DAY 90 tablet 3     TRADJENTA 5 mg Tab TAKE 1 TABLET BY MOUTH EVERY DAY WITH OR WHITOUT FOOD 30 tablet 5     No facility-administered medications prior to visit.

## 2021-06-11 NOTE — PROGRESS NOTES
Assessment and Plan:     1. Rash  2. Type 2 diabetes  Suspect this is contact dermatitis versus allergic reaction.  Will treat with prednisone 20 mg twice daily for 5 days.  Educated on its indications and side effects.  Patient is to monitor his blood sugar.  Recommend patient take cetirizine 10 mg in the morning and Benadryl in the evening.  Did offer EpiPen to have on hand, but he declines.  Discussed concerning symptoms including worsening rash, shortness of breath, chest tightness, wheezing, and swelling of the face.  If this occurs, suggest ER evaluation.  He is content with the plan.  - predniSONE (DELTASONE) 20 MG tablet; Take 1 tablet (20 mg total) by mouth 2 (two) times a day for 5 days.  Dispense: 10 tablet; Refill: 0    Subjective:     Ced is a 61 y.o. male presenting to the clinic for concerns for rash.  Patient patient states he ate at [employer] last Wednesday.  He does not typically eat at [employer] and did not feel as though the grease settled well with him.  Last Thursday, he developed itching on his chest.  Friday, he did cut the grass in the backyard.  Sunday, he noticed the rash and developed chills.  He denies recent cold symptoms including rhinorrhea, headache, stomachache, postnasal drainage, sore throat, cough, and fever.  He has not had any shortness of breath, chest tightness, wheezing.  He does not feel as though his throat is swelling.  He denies any recent tick bites.  He has not started any new medication.  He denies any recent travel.  No one else around him has a rash.  He has been taking over-the-counter Benadryl which is providing minimal assistance.    Review of Systems: A complete 14 point review of systems was obtained and is negative or as stated in the history of present illness.    Social History     Social History     Marital status:      Spouse name: N/A     Number of children: N/A     Years of education: N/A     Occupational History     Not on file.     Social  "History Main Topics     Smoking status: Never Smoker     Smokeless tobacco: Never Used     Alcohol use Yes      Comment: Very little     Drug use: No     Sexual activity: Yes     Partners: Female      Comment:      Other Topics Concern     Not on file     Social History Narrative       Active Ambulatory Problems     Diagnosis Date Noted     Microalbuminuria      Hypertension      Chest Pain      Type 2 diabetes mellitus      Dyslipidemia 08/26/2016     Elevated liver enzymes 05/28/2017     Resolved Ambulatory Problems     Diagnosis Date Noted     Hyperlipidemia      Acute Upper Respiratory Infection      Past Medical History:   Diagnosis Date     Diabetes mellitus        Family History   Problem Relation Age of Onset     Breast cancer Mother      Diabetes Father        Objective:     /62 (Patient Site: Left Arm, Patient Position: Sitting, Cuff Size: Adult Regular)  Pulse 92  Ht 5' 9\" (1.753 m)  Wt 172 lb (78 kg)  SpO2 96%  BMI 25.4 kg/m2    Patient is alert, in no obvious distress.   Skin: Warm, dry. He has an erythematous, generalized macular rash present on his trunk and bilateral upper extremities.    HEENT:  Head normocephalic, atraumatic.  Eyes normal.  Ears normal.  Nose patent, mucosa pink.  Oropharynx mucosa pink.  No lesions or tonsillar enlargement.   Neck: Supple, no lymphadenopathy.  Lungs:  Clear to auscultation. Respirations even and unlabored.  No wheezing or rales noted.   Heart:  Regular rate and rhythm.  No murmurs.              "

## 2021-06-11 NOTE — PROGRESS NOTES
Assessment: Efrain is here to talk about medication and meal planning.    Efrain takes Glyburide 10mg two times daily with Metformin 1000mg two time daily.  We discussed timing of his medications and he is taking his glyburide after meals.  Suggested moving to before meals for better effect.  He is willing to try this.  He takes Tradjenta 5mg in the morning.    Efrain checks his glucose in the morning.  Stated before his appointment with his primary his glucose was running 150-190, now he is running .  Suggested he test before and after dinner to find out if Glyburide is working appropriately.  He will think about this.    After his recent appointment Efrain and his wife have made some improvements to their meal plan.  Stated they are eating more along the lines of the Mediterranean Diet with more fish, chicken and vegetables.  B-cereal, corn or rice Chex.  2-4 cups coffee with half and half.  L- sandwiches.  Leftovers.  Sometimes fast food still.  He drinks water or unsweetened iced tea.  D-fish with vegetables.  Chicken, brats or hamburgers once in a while.  Water or tea with this meal  S-fruit and nuts  Commend Efrain on his hard work and sticking to his plan.    He walks for 20-30 minutes every day before work.  Stated he has an elliptical he uses in the winter.  He is active at work, stated he goes up and down about 12 flights of stairs daily and is walking 3-5 miles a day just at work.  In January Efrain was moved to a new department at work.  Stated this has increased his stress significantly.  He is working on a project that will, hopefully, be done soon and feels this will help with stress relief.    Plan: Efrain would like a couple more weeks to work on his new lifestyle changes and changing the timing of his medication and see if this helps his readings improve more than they have.  Asked to come back in 4-6 weeks for follow-up.  He did not schedule an appointment before leaving today.    Subjective and  Objective:      Ced Marroquinnlliliana is referred by Raquel Reddy CNP for Diabetes Education.     Lab Results   Component Value Date    HGBA1C 9.8 (H) 05/12/2017         Current diabetes medications:  Glyburide 10mg BID, Metformin 1000mg BID, Tradjenta 5mg daily    Goals       HEMOGLOBIN A1C < 8.0            Goals for 06.13.17:  Medication-  Take morning pills before breakfast and evening pills before dinner  Monitoring-  Try checking glucose before and after dinner for more information if your medication is working well  Nutrition-  Continue with your current meal plan            Follow up:   CDE (certified diabetic educator)      Education:     Monitoring   Meter (per above goals): Assessed and Discussed  Monitoring: Assessed and Discussed  BG goals: Assessed and Discussed    Nutrition Management  Nutrition Management: Assessed and Discussed  Weight: Assessed  Portions/Balance: Assessed and Discussed  Carb ID/Count: Assessed and Discussed  Label Reading: Assessed and Competent  Heart Healthy Fats: Assessed and Discussed  Menu Planning: Assessed and Discussed  Dining Out: Assessed and Discussed  Physical Activity: Assessed and Discussed  Medications: Assessed and Discussed  Orals: Assessed and Discussed  Injected Medications: Not addressed   Storage/Exp:Not addressed   Site Rotation: Not addressed   Sites Assessed: no    Diabetes Disease Process: Assessed and Discussed    Acute Complications: Prevent, Detect, Treat:  Hypoglycemia: Assessed and Discussed  Hyperglycemia: Assessed and Discussed  Sick Days: Needs instruction/review at follow-up and Not addressed  Driving: Needs instruction/review at follow-up and Not addressed    Chronic Complications  Foot Care:Needs instruction/review at follow-up and Not addressed  Skin Care: Needs instruction/review at follow-up and Not addressed  Eye: Needs instruction/review at follow-up and Not addressed  ABC: Assessed and Discussed  Teeth:Needs instruction/review at follow-up and  Not addressed  Goal Setting and Problem Solving: Assessed and Discussed  Barriers: Assessed and Discussed  Psychosocial Adjustments: Assessed and Discussed      Time spent with the patient: 60 minutes for diabetes education and counseling.   Previous Education: yes  Visit Type:DSMT  Hours Remaining: DSMT 1 and MNT 2      Sidra Reynaga  6/17/2017

## 2021-06-12 NOTE — TELEPHONE ENCOUNTER
Called Efrain to set up MTM appt due to worsening in A1c per Raquel. Last saw > 1 year ago, therefore initial appointment. No answer, left voicemail. If patient returns call, please set up initial MTM appt.     I will try again next week if he does not call back.     Julieta Alas, Pharm.D., BCACP  Medication Therapy Management Pharmacist  [facility]

## 2021-06-12 NOTE — TELEPHONE ENCOUNTER
RN cannot approve Refill Request    RN can NOT refill this medication Protocol failed and NO refill given. Last office visit: 10/11/2019 Raquel Reddy CNP Last Physical: 3/26/2019 Last MTM visit: Visit date not found Last visit same specialty: 10/11/2019 Raquel Reddy CNP.  Next visit within 3 mo: Visit date not found  Next physical within 3 mo: Visit date not found      Beth Victor, [facility] Triage/Med Refill 10/22/2020    Requested Prescriptions   Pending Prescriptions Disp Refills     metFORMIN (GLUCOPHAGE) 1000 MG tablet 180 tablet 1     Sig: Take 1 tablet (1,000 mg total) by mouth every 12 (twelve) hours.       Metformin Refill Protocol Failed - 10/21/2020  7:23 AM        Failed - Blood pressure in last 12 months     BP Readings from Last 1 Encounters:   10/11/19 104/64             Failed - LFT or AST or ALT in last 12 months     Albumin   Date Value Ref Range Status   03/26/2019 4.1 3.5 - 5.0 g/dL Final     Bilirubin, Total   Date Value Ref Range Status   03/26/2019 0.4 0.0 - 1.0 mg/dL Final     Bilirubin, Direct   Date Value Ref Range Status   10/26/2010 0.2 <0.6 mg/dL Final     Alkaline Phosphatase   Date Value Ref Range Status   03/26/2019 65 45 - 120 U/L Final     AST   Date Value Ref Range Status   03/26/2019 20 0 - 40 U/L Final     ALT   Date Value Ref Range Status   03/26/2019 29 0 - 45 U/L Final     Protein, Total   Date Value Ref Range Status   03/26/2019 7.3 6.0 - 8.0 g/dL Final                Failed - GFR or Serum Creatinine in last 6 months     GFR MDRD Non Af Amer   Date Value Ref Range Status   03/26/2019 >60 >60 mL/min/1.73m2 Final     GFR MDRD Af Amer   Date Value Ref Range Status   03/26/2019 >60 >60 mL/min/1.73m2 Final             Failed - Visit with PCP or prescribing provider visit in last 6 months or next 3 months     Last office visit with prescriber/PCP: Visit date not found OR same dept: Visit date not found OR same specialty: 10/11/2019 Raquel Reddy CNP Last physical:  Visit date not found Last MTM visit: Visit date not found         Next appt within 3 mo: Visit date not found  Next physical within 3 mo: Visit date not found  Prescriber OR PCP: Raquel Reddy CNP  Last diagnosis associated with med order: 1. Type 2 diabetes mellitus with hyperglycemia, without long-term current use of insulin (H)  - metFORMIN (GLUCOPHAGE) 1000 MG tablet; Take 1 tablet (1,000 mg total) by mouth every 12 (twelve) hours.  Dispense: 180 tablet; Refill: 1     If protocol passes may refill for 12 months if within 3 months of last provider visit (or a total of 15 months).           Failed - A1C in last 6 months     Hemoglobin A1c   Date Value Ref Range Status   10/11/2019 8.0 (H) 3.5 - 6.0 % Final               Failed - Microalbumin in last year      Microalbumin, Random Urine   Date Value Ref Range Status   03/26/2019 6.94 (H) 0.00 - 1.99 mg/dL Final

## 2021-06-12 NOTE — PROGRESS NOTES
Assessment and Plan:     1. Type 2 diabetes mellitus with hyperglycemia, without long-term current use of insulin (H)  We will check A1c notify patient of results.  He continues Xultophy and metformin as prescribed.  If A1c remains elevated, may consider other injectable medications.  - Glycosylated Hemoglobin A1c    2. Dyslipidemia  Goal LDL is less than 100.  Patient continues pravastatin.  He is not fasting today.  - LDL Cholesterol, Direct    3. Hypertension  This is controlled with lisinopril.    4. Proteinuria, unspecified type  Patient has a history of microalbuminuria.  If it remains present, will refer to nephrology.  - Microalbumin, Random Urine    5. Encounter for hepatitis C screening test for low risk patient  - Hepatitis C Antibody (Anti-HCV)    6. Encounter for screening for HIV  - HIV Antigen/Antibody Screening [place]    7. Screening for prostate cancer  - PSA, Annual Screen (Prostatic-Specific Antigen)    8. Medication management  - Comprehensive Metabolic Panel    I enjoyed my visit with Efrain today and look forward to seeing him for his next diabetes check in 3-6 months.    Subjective:     Ced is a 64 y.o. male presenting to the clinic for medication management.  Patient has multiple comorbidities including type 2 diabetes, dyslipidemia, hypertension, microalbuminuria.  Patient is injecting Xultophy 22 units daily and taking Metformin 1000 mg twice daily for his diabetes.  Last hemoglobin A1c was 8% on 10/11/2019.  He is consuming a healthy diet.  He checks his blood sugars multiple times daily and they have been running 130-140.  He did have a fasting blood sugar of 200 this morning.  He walks 5 to 6 miles per day.  His last eye exam was last December.  He denies any sores on his feet.  Patient has hyperlipidemia and is taking pravastatin.  Last cholesterol check was on 3/26/2019 with a total cholesterol 145, triglycerides 139, HDL 53, LDL 64.  Blood pressure is controlled with lisinopril 5  mg daily.  Overall, he feels well today and has no other concerns.    Review of Systems: A complete 14 point review of systems was obtained and is negative or as stated in the history of present illness.    Social History     Socioeconomic History     Marital status:      Spouse name: Not on file     Number of children: Not on file     Years of education: Not on file     Highest education level: Not on file   Occupational History     Not on file   Social Needs     Financial resource strain: Not on file     Food insecurity     Worry: Not on file     Inability: Not on file     Transportation needs     Medical: Not on file     Non-medical: Not on file   Tobacco Use     Smoking status: Never Smoker     Smokeless tobacco: Never Used   Substance and Sexual Activity     Alcohol use: Yes     Comment: Very little     Drug use: No     Sexual activity: Yes     Partners: Female     Comment:    Lifestyle     Physical activity     Days per week: Not on file     Minutes per session: Not on file     Stress: Not on file   Relationships     Social connections     Talks on phone: Not on file     Gets together: Not on file     Attends Jainism service: Not on file     Active member of club or organization: Not on file     Attends meetings of clubs or organizations: Not on file     Relationship status: Not on file     Intimate partner violence     Fear of current or ex partner: Not on file     Emotionally abused: Not on file     Physically abused: Not on file     Forced sexual activity: Not on file   Other Topics Concern     Not on file   Social History Narrative     Not on file       Active Ambulatory Problems     Diagnosis Date Noted     Microalbuminuria      Hypertension      Type 2 diabetes mellitus with hyperglycemia, without long-term current use of insulin (H)      Dyslipidemia 08/26/2016     Resolved Ambulatory Problems     Diagnosis Date Noted     Hyperlipidemia      Chest Pain      Acute Upper Respiratory  "Infection      Elevated liver enzymes 05/28/2017     Past Medical History:   Diagnosis Date     Diabetes mellitus (H)        Family History   Problem Relation Age of Onset     Breast cancer Mother      Diabetes Father        Objective:     /68 (Patient Site: Left Arm, Patient Position: Sitting, Cuff Size: Adult Regular)   Pulse 84   Ht 5' 7.52\" (1.715 m)   Wt 173 lb 1.6 oz (78.5 kg)   SpO2 97%   BMI 26.70 kg/m      Patient is alert, in no obvious distress.   Skin: Warm, dry.    Neck: Supple, no lymphadenopathy. No thyromegaly.  Lungs:  Clear to auscultation. Respirations even and unlabored.  No wheezing or rales noted.   Heart:  Regular rate and rhythm.  No murmurs, S3, S4, gallops, or rubs.    Abdomen: Soft, nontender.  No organomegaly. Bowel sounds normoactive. No guarding or masses noted.   Musculoskeletal: No edema is present in bilateral lower extremities.              "

## 2021-06-13 NOTE — TELEPHONE ENCOUNTER
Refill Approved    Rx renewed per Medication Renewal Policy. Medication was last renewed on 8/17/20.    Beth Victor, [facility] Triage/Med Refill 12/15/2020     Requested Prescriptions   Pending Prescriptions Disp Refills     lisinopriL (PRINIVIL,ZESTRIL) 5 MG tablet 90 tablet 0     Sig: Take 1 tablet (5 mg total) by mouth daily.       Ace Inhibitors Refill Protocol Passed - 12/14/2020  9:45 AM        Passed - PCP or prescribing provider visit in past 12 months       Last office visit with prescriber/PCP: 10/30/2020 Raquel Reddy CNP OR same dept: 10/30/2020 Raquel Reddy CNP OR same specialty: 10/30/2020 Raquel Reddy CNP  Last physical: 3/26/2019 Last MTM visit: Visit date not found   Next visit within 3 mo: Visit date not found  Next physical within 3 mo: Visit date not found  Prescriber OR PCP: Raquel Reddy CNP  Last diagnosis associated with med order: 1. Essential hypertension  - lisinopriL (PRINIVIL,ZESTRIL) 5 MG tablet; Take 1 tablet (5 mg total) by mouth daily.  Dispense: 90 tablet; Refill: 0    If protocol passes may refill for 12 months if within 3 months of last provider visit (or a total of 15 months).             Passed - Serum Potassium in past 12 months     Lab Results   Component Value Date    Potassium 4.4 10/30/2020             Passed - Blood pressure filed in past 12 months     BP Readings from Last 1 Encounters:   10/30/20 132/68             Passed - Serum Creatinine in past 12 months     Creatinine   Date Value Ref Range Status   10/30/2020 0.77 0.70 - 1.30 mg/dL Final

## 2021-06-13 NOTE — PROGRESS NOTES
MTM Initial Encounter  Assessment & Plan                                                     Type 2 Diabetes:  needs improvement. A1C not at goal of <7%. FBG not at goal  mg/dL per reported values.  Patient did not have blood sugars, therefore unable to fully assess.  Reviewed that since his A1c was close to 10%, very likely his postprandial blood sugars are near 300.  Reviewed Xultophy and that it is a ratio of Tresiba and Victoza combined (1 unit = 1 unit Tresiba and 0.036 mg Victoza).  Her current doses are Tresiba 22 units and Victoza 0.792 mg.  He is on a very small amount of Victoza which is likely not therapeutic, and therefore needs higher dose to help with postprandial coverage. Typical titration for Xultophy is increased 2 units every 3 to 4 days, therefore will increase by 2 units to 24 units daily and follow-up with him in a few weeks with his blood sugars.   Also recommended to consider a CGM to get his 2 hour post prandial values. He will think more about it and we can discuss more at follow up.   PLAN:   1. Increase Xultphoy to 24 units daily.   2. Consider FreeStyle Lv CGM     Follow Up  3 week phone follow up     Subjective & Objective                                                     Ced Fajardo is a 65 y.o. male called for an initial visit for Medication Therapy Management. He was referred to me from Raquel Reddy CNP. I last spoke with patient May 2019.     Patient consented to a telehealth visit: Yes  Reason for visit: diabetes   Medication Adherence/Access: No adherence concerns.     Type 2 Diabetes: Currently taking Xultophy 22 units once daily and metformin 1000 mg two times a day.  Was on glyburide before. No diarrhea.    Xultophy was started by DME.  Has had diabetes for 10 years - dad had diabetes.   Tests BG 1 times daily fasting AM.  was high due to her job being very stressful and last few weeks was in 200s. Three days ago 159, 150, 140. Before was 130s-140s.   He was  "on a professional CGM in the past. Is not really interested in a CGM today but will think about it.   Last A1c checked  10/30/2020 = 9.9, previously the year prior was 8% on 10/11/2019  Hypoglycemia none - 6 weeks was 90  Hyperglycemia symptoms: none, but maybe a little blurred vision but might be from the computer all day  Microalbumin checked  10/30/2020. Is on lisinopril 5 mg daily.   Is taking pravastatin 40 mg daily. Last LDL checked 10/30/2020  Is taking aspirin 81 mg for primary prevention.   Walking about 5 miles a day.   Wife is helping with healthy foods, chicken and soups, spinach salads. No longer grazing all day and has more of a routine.   Normal renal function  Wt Readings from Last 3 Encounters:   10/30/20 173 lb 1.6 oz (78.5 kg)   10/11/19 164 lb 12.8 oz (74.8 kg)   06/28/19 165 lb (74.8 kg)         PMH: reviewed in EPIC   Allergies/ADRs: reviewed in EPIC   Alcohol: reviewed in EPIC   Tobacco:   Social History     Tobacco Use   Smoking Status Never Smoker   Smokeless Tobacco Never Used     Today's Vitals: There were no vitals filed for this visit.  ----------------    The patient declined an after visit summary    I spent 14 minutes with this patient today.   All changes were made via collaborative practice agreement with Raquel Reddy CNP. A copy of the visit note was provided to the patient's provider.     Julieta Alas, Pharm.D., BCACP  Medication Therapy Management Pharmacist  [place] [facility]    Telemedicine Visit Details    Type of service:  Telephone     Start Time: 11:03 AM  End Time: 11:17 AM    Originating Location (pt. Location): Home    Distant Location (provider location):  [place] [facility]AP[facility]NAG[facility]    Mode of Communication: Telephone    Current Outpatient Medications   Medication Sig Dispense Refill     aspirin 81 MG EC tablet Take 81 mg by mouth daily.       BD PEDRITO 2ND GEN PEN NEEDLE 32 gauge x 5/32\" Ndle USE ONCE DAILY AS " DIRECTED 100 each 2     blood glucose test (GLUCOSE BLOOD) strips Use 1 each As Directed 3 (three) times a day. Accu-Chek Jessica In Vitro Strip - Use 1 strip 3 times daily 100 strip 5     LANCETS MISC Use As Directed. Accu-Chek MultiClix Lancets       lisinopriL (PRINIVIL,ZESTRIL) 5 MG tablet Take 1 tablet (5 mg total) by mouth daily. 90 tablet 0     metFORMIN (GLUCOPHAGE) 1000 MG tablet Take 1 tablet (1,000 mg total) by mouth every 12 (twelve) hours. 180 tablet 0     multivitamin (ONE A DAY) per tablet Take 1 tablet by mouth daily.       pravastatin (PRAVACHOL) 40 MG tablet TAKE 1 TABLET BY MOUTH DAILY 90 tablet 3     sildenafiL (VIAGRA) 50 MG tablet Take 0.5-1 tablets (25-50 mg total) by mouth daily as needed for erectile dysfunction. 30 tablet 5     XULTOPHY 100/3.6 100 unit-3.6 mg /mL (3 mL) InPn INJECT 22 UNITS UNDER THE SKIN ONCE DAILY 15 mL 2     No current facility-administered medications for this visit.         Medication Therapy Recommendations Needing Review  No medication therapy recommendations to display

## 2021-06-13 NOTE — TELEPHONE ENCOUNTER
"Refill Approved    Rx renewed per Medication Renewal Policy. Medication was last renewed on 2/4/20.6/2/20.    Beth Victor, [facility] Triage/Med Refill 11/16/2020     Requested Prescriptions   Pending Prescriptions Disp Refills     pravastatin (PRAVACHOL) 40 MG tablet [Pharmacy Med Name: PRAVASTATIN 40MG TABLETS] 90 tablet 2     Sig: TAKE 1 TABLET BY MOUTH DAILY       Statins Refill Protocol (Hmg CoA Reductase Inhibitors) Passed - 11/14/2020  5:41 AM        Passed - PCP or prescribing provider visit in past 12 months      Last office visit with prescriber/PCP: 10/30/2020 Raquel Reddy CNP OR same dept: 10/30/2020 Raquel Reddy CNP OR same specialty: 10/30/2020 Raquel Reddy CNP  Last physical: 3/26/2019 Last MTM visit: Visit date not found   Next visit within 3 mo: Visit date not found  Next physical within 3 mo: Visit date not found  Prescriber OR PCP: Raquel Reddy CNP  Last diagnosis associated with med order: 1. Hyperlipidemia  - pravastatin (PRAVACHOL) 40 MG tablet [Pharmacy Med Name: PRAVASTATIN 40MG TABLETS]; TAKE 1 TABLET BY MOUTH DAILY  Dispense: 90 tablet; Refill: 2    2. Type 2 diabetes mellitus with hyperglycemia, without long-term current use of insulin (H)  - BD PEDRITO 2ND GEN PEN NEEDLE 32 gauge x 5/32\" Ndle [Pharmacy Med Name: B-D PEDRITO 2ND GEN PEN NDL [id number]]; USE ONCE DAILY AS DIRECTED  Dispense: 100 each; Refill: 2    If protocol passes may refill for 12 months if within 3 months of last provider visit (or a total of 15 months).                BD PEDRITO 2ND GEN PEN NEEDLE 32 gauge x 5/32\" Ndle [Pharmacy Med Name: B-D PEDRITO 2ND GEN PEN NDL [id number]] 100 each 2     Sig: USE ONCE DAILY AS DIRECTED       Diabetic Supplies Refill Protocol Passed - 11/14/2020  5:41 AM        Passed - Visit with PCP or prescribing provider visit in last 6 months     Last office visit with prescriber/PCP: 10/30/2020 Raqeul Reddy CNP OR same dept: 10/30/2020 Raquel Reddy CNP OR same specialty: " "10/30/2020 Raquel Reddy CNP  Last physical: 3/26/2019 Last MTM visit: Visit date not found   Next visit within 3 mo: Visit date not found  Next physical within 3 mo: Visit date not found  Prescriber OR PCP: Raquel Reddy CNP  Last diagnosis associated with med order: 1. Hyperlipidemia  - pravastatin (PRAVACHOL) 40 MG tablet [Pharmacy Med Name: PRAVASTATIN 40MG TABLETS]; TAKE 1 TABLET BY MOUTH DAILY  Dispense: 90 tablet; Refill: 2    2. Type 2 diabetes mellitus with hyperglycemia, without long-term current use of insulin (H)  - BD PEDRITO 2ND GEN PEN NEEDLE 32 gauge x 5/32\" Ndle [Pharmacy Med Name: B-D PEDRITO 2ND GEN PEN NDL [id number]]; USE ONCE DAILY AS DIRECTED  Dispense: 100 each; Refill: 2    If protocol passes may refill for 12 months if within 3 months of last provider visit (or a total of 15 months).             Passed - A1C in last 6 months     Hemoglobin A1c   Date Value Ref Range Status   10/30/2020 9.9 (H) <=5.6 % Final     Comment:     Normal <5.7% Prediabete 5.7-6.4% Diabletes 6.5% or higher - adopted from ADA consensus guidelines                              "

## 2021-06-14 NOTE — TELEPHONE ENCOUNTER
Refill Approved    Rx renewed per Medication Renewal Policy. Medication was last renewed on 10/22/20.    Beth Victor, [facility] Triage/Med Refill 1/13/2021     Requested Prescriptions   Pending Prescriptions Disp Refills     metFORMIN (GLUCOPHAGE) 1000 MG tablet [Pharmacy Med Name: METFORMIN 1000MG TABLETS] 180 tablet 0     Sig: TAKE 1 TABLET(1000 MG) BY MOUTH EVERY 12 HOURS       Metformin Refill Protocol Passed - 1/13/2021  5:45 AM        Passed - Blood pressure in last 12 months     BP Readings from Last 1 Encounters:   10/30/20 132/68             Passed - LFT or AST or ALT in last 12 months     Albumin   Date Value Ref Range Status   10/30/2020 4.2 3.5 - 5.0 g/dL Final     Bilirubin, Total   Date Value Ref Range Status   10/30/2020 0.4 0.0 - 1.0 mg/dL Final     Bilirubin, Direct   Date Value Ref Range Status   10/26/2010 0.2 <0.6 mg/dL Final     Alkaline Phosphatase   Date Value Ref Range Status   10/30/2020 69 45 - 120 U/L Final     AST   Date Value Ref Range Status   10/30/2020 16 0 - 40 U/L Final     ALT   Date Value Ref Range Status   10/30/2020 21 0 - 45 U/L Final     Protein, Total   Date Value Ref Range Status   10/30/2020 7.2 6.0 - 8.0 g/dL Final                Passed - GFR or Serum Creatinine in last 6 months     GFR MDRD Non Af Amer   Date Value Ref Range Status   10/30/2020 >60 >60 mL/min/1.73m2 Final     GFR MDRD Af Amer   Date Value Ref Range Status   10/30/2020 >60 >60 mL/min/1.73m2 Final             Passed - Visit with PCP or prescribing provider visit in last 6 months or next 3 months     Last office visit with prescriber/PCP: 10/30/2020 OR same dept: 10/30/2020 Raquel Reddy CNP OR same specialty: 10/30/2020 Raquel Reddy CNP Last physical: Visit date not found Last MTM visit: Visit date not found         Next appt within 3 mo: Visit date not found  Next physical within 3 mo: Visit date not found  Prescriber OR PCP: Raquel Reddy CNP  Last diagnosis associated with med order: 1.  Type 2 diabetes mellitus with hyperglycemia, without long-term current use of insulin (H)  - metFORMIN (GLUCOPHAGE) 1000 MG tablet [Pharmacy Med Name: METFORMIN 1000MG TABLETS]; TAKE 1 TABLET(1000 MG) BY MOUTH EVERY 12 HOURS  Dispense: 180 tablet; Refill: 0     If protocol passes may refill for 12 months if within 3 months of last provider visit (or a total of 15 months).           Passed - A1C in last 6 months     Hemoglobin A1c   Date Value Ref Range Status   10/30/2020 9.9 (H) <=5.6 % Final     Comment:     Normal <5.7% Prediabete 5.7-6.4% Diabletes 6.5% or higher - adopted from ADA consensus guidelines               Passed - Microalbumin in last year      Microalbumin, Random Urine   Date Value Ref Range Status   10/30/2020 4.07 (H) 0.00 - 1.99 mg/dL Final

## 2021-06-14 NOTE — PROGRESS NOTES
MTM Follow Up Encounter  Assessment & Plan                                                     Type 2 Diabetes: Per reported BG values, BG do sound better. Will continue current doses and recheck a1c end of the month. Will continue to discuss CGM in the future especially if his A1c remains high.     Proteinuria: Will update lisinopril on file. Reports BMP was done. Continue to check BP at home and follow up with nephrology. Will recheck proteinuria once A1c is improved.       Follow Up  4 weeks to set up A1c recheck    Subjective & Objective                                                       Ced Fajardo is a 65 y.o. male called for a follow up visit for Medication Therapy Management. He was referred to me from Raquel Reddy CNP    Patient consented to a telehealth visit: Yes  Reason for visit: follow up since last appt on 12/11/20  Medication Adherence/Access: No adherence concerns.     Type 2 Diabetes: Currently taking Xultophy 24 units once daily and metformin 1000 mg two times a day.    Increased from 22 units on 12/11/20. Xultophy was started by DME.  Was on glyburide before.   No diarrhea.    Has had diabetes for 10 years - dad had diabetes.   Tests BG 1 times daily fasting AM. Reports now seeing values <130 mainly, a few 150s.    He was on a professional CGM in the past. Was not really interested in a CGM at last MTM appt, but was recommended to think about it.   Last A1c checked 10/30/2020 = 9.9%, previously 8% on 10/11/2019  Hypoglycemia none  Hyperglycemia symptoms: none  Is taking pravastatin 40 mg daily. Last LDL checked 10/30/2020  Is taking aspirin 81 mg for primary prevention.   Walking about 5 miles a day.   Wife is helping with healthy foods, chicken and soups, spinach salads. No longer grazing all day and has more of a routine.   Normal renal function      Wt Readings from Last 3 Encounters:   10/30/20 173 lb 1.6 oz (78.5 kg)   10/11/19 164 lb 12.8 oz (74.8 kg)   06/28/19 165 lb (74.8 kg)  "      Proteinuria: Met with nephrology on 12/15/20 due to proteinuria. Underwent a renal US on 12/28/20 and reports he was told it was normal. Is on lisinopril 10 mg daily -- increased from 5 mg by nephrology. Was recommended to get a BP cuff and monitor at home to get BP goal <130/80 mmHg. Reports he was able to get a home BP machine and values have been 120-130/70s mmHg. Denies lightheadedness/dizziness.  Reports he had his BMP rechecked with nephrology about a week after his appointment.       PMH: reviewed in EPIC   Allergies/ADRs: reviewed in EPIC   Alcohol: reviewed in EPIC  Tobacco:   Social History     Tobacco Use   Smoking Status Never Smoker   Smokeless Tobacco Never Used     Today's Vitals: There were no vitals filed for this visit.  ----------------    The patient declined an after visit summary    I spent 10 minutes with this patient today;   All changes were made via collaborative practice agreement with Raquel Reddy CNP. A copy of the visit note was provided to the patient's provider.     Julieta Alas, Pharm.D., BCACP  Medication Therapy Management Pharmacist  [place] and [facility]    Telemedicine Visit Details    Type of service:  Telephone     Start Time: 10:00 Am  End Time: 10:10 Am    Originating Location (pt. Location): Home    Distant Location (provider location):  [place] MEDICATION THERAPY MANAGEMENT [facility]    Mode of Communication: Telephone    Current Outpatient Medications   Medication Sig Dispense Refill     aspirin 81 MG EC tablet Take 81 mg by mouth daily.       BD PEDRITO 2ND GEN PEN NEEDLE 32 gauge x 5/32\" Ndle USE ONCE DAILY AS DIRECTED 100 each 2     blood glucose test (GLUCOSE BLOOD) strips Use 1 each As Directed 3 (three) times a day. Accu-Chek Jessica In Vitro Strip - Use 1 strip 3 times daily 100 strip 5     insulin degludec-liraglutide (XULTOPHY 100/3.6) 100 unit-3.6 mg /mL (3 mL) InPn Inject 24 Units under the skin daily. 15 mL 2     LANCETS MISC Use As " Directed. Accu-Chek MultiClix Lancets       lisinopriL (PRINIVIL,ZESTRIL) 5 MG tablet Take 1 tablet (5 mg total) by mouth daily. 90 tablet 3     metFORMIN (GLUCOPHAGE) 1000 MG tablet Take 1 tablet (1,000 mg total) by mouth every 12 (twelve) hours. 180 tablet 0     multivitamin (ONE A DAY) per tablet Take 1 tablet by mouth daily.       pravastatin (PRAVACHOL) 40 MG tablet TAKE 1 TABLET BY MOUTH DAILY 90 tablet 3     sildenafiL (VIAGRA) 50 MG tablet Take 0.5-1 tablets (25-50 mg total) by mouth daily as needed for erectile dysfunction. 30 tablet 5     No current facility-administered medications for this visit.         Medication Therapy Recommendations  No medication therapy recommendations to display

## 2021-06-14 NOTE — PROGRESS NOTES
Assessment and Plan:     1. Type 2 diabetes mellitus with hyperglycemia, without long-term current use of insulin  Patient's hemoglobin A1c is pending at time of dictation.  If A1c is less than 8%,  Patient would like to continue his current medication regimen and continue healthy lifestyle changes.  If greater than 8%, he would like to start insulin.  Will refer to diabetes education then.  - Glycosylated Hemoglobin A1c    2. Hypertension  This is well controlled lisinopril.    3. Dyslipidemia  He continues pravastatin 40 mg daily.    4. Overweight  The following high BMI interventions were performed this visit: exercise promotion: strength training, exercise promotion: stretching and nutrition therapy    5. Health care maintenance  - Influenza, Seasonal,Quad Inj, 36+ MOS    The patient is content with the plan and will follow-up in 3 months for medication management or sooner with any further concerns.       Subjective:     Ced is a 62 y.o. male presenting to the clinic for diabetes check.  Patient's last hemoglobin A1c was 9.8% on 5/12/17.  He is taking Tradjenta 5 mg daily, metformin 1000 mg twice daily, glyburide 10 mg twice daily.  Patient has seen endocrinology and diabetes education.  Patient consumes a Mediterranean diet.  He has noticed over the past month, his blood sugars have been elevated in the morning.  His average blood sugar was 179.  He does check his fasting blood sugar once daily.  He denies symptoms of hypoglycemia.  He is walking 2-3 miles per day.  He consumes a Mediterranean diet.  He is taking pravastatin for hyperlipidemia.  Last cholesterol check was on 5/12/17 with a total cholesterol 162, triglycerides 193, HDL 43, LDL 80.  He denies chest pain, shortness of breath with exertion, edema, orthopnea, and syncope.      Review of Systems: A complete 14 point review of systems was obtained and is negative or as stated in the history of present illness.    Social History     Social History  "    Marital status:      Spouse name: N/A     Number of children: N/A     Years of education: N/A     Occupational History     Not on file.     Social History Main Topics     Smoking status: Never Smoker     Smokeless tobacco: Never Used     Alcohol use Yes      Comment: Very little     Drug use: No     Sexual activity: Yes     Partners: Female      Comment:      Other Topics Concern     Not on file     Social History Narrative       Active Ambulatory Problems     Diagnosis Date Noted     Microalbuminuria      Hypertension      Chest Pain      Type 2 diabetes mellitus      Dyslipidemia 08/26/2016     Elevated liver enzymes 05/28/2017     Resolved Ambulatory Problems     Diagnosis Date Noted     Hyperlipidemia      Acute Upper Respiratory Infection      Past Medical History:   Diagnosis Date     Diabetes mellitus        Family History   Problem Relation Age of Onset     Breast cancer Mother      Diabetes Father        Objective:     /78  Pulse 68  Ht 5' 6.5\" (1.689 m)  Wt 172 lb 14.4 oz (78.4 kg)  BMI 27.49 kg/m2    Patient is alert, in no obvious distress.   Skin: Warm, dry.    Lungs:  Clear to auscultation. Respirations even and unlabored.  No wheezing or rales noted.   Heart:  Regular rate and rhythm.  No murmurs, S3, S4, gallops, or rubs.    Abdomen: Soft, nontender.  No organomegaly. Bowel sounds normoactive. No guarding or masses noted.   Musculoskeletal:  Monofilament testing is normal bilaterally.               "

## 2021-06-15 NOTE — PROGRESS NOTES
Assessment and Plan:     1. Type 2 diabetes mellitus with hyperglycemia, without long-term current use of insulin (H)  This has been uncontrolled.  Will check A1c today and notify patient of results.  We continue to consult with our pharmacist to assist with insulin dosing.  - Glycosylated Hemoglobin A1c    2. Hypertension  This is controlled with lisinopril.    3. Dyslipidemia  LDL is at goal.  He continues pravastatin.    4. Proteinuria, unspecified type  He continues to see nephrology.  This is stable.    I enjoyed my visit with Efrain today and look forward to seeing him as needed in the future.    Subjective:     Ced is a 65 y.o. male presenting to the clinic for a diabetes check.  Patient is taking Metformin 1000 mg twice daily and Xultophy 24 units daily for type 2 diabetes.  He has been checking his blood sugars every morning and they have been ranging 118-160.  He is consuming a healthy diet.  He walks 5 miles a day.  His last eye exam was in December.  He denies any sores on his feet.  He takes pravastatin for dyslipidemia.  LDL is at goal.  He is taking lisinopril for hypertension which is controlled.  He did see nephrology due to microalbuminuria recommended blood pressure control.  He does occasionally monitor his blood pressure at home.  He is due for follow-up with nephrology in 4 months.    Review of Systems: A complete 14 point review of systems was obtained and is negative or as stated in the history of present illness.    Social History     Socioeconomic History     Marital status:      Spouse name: Not on file     Number of children: Not on file     Years of education: Not on file     Highest education level: Not on file   Occupational History     Not on file   Social Needs     Financial resource strain: Not on file     Food insecurity     Worry: Not on file     Inability: Not on file     Transportation needs     Medical: Not on file     Non-medical: Not on file   Tobacco Use     Smoking  status: Never Smoker     Smokeless tobacco: Never Used   Substance and Sexual Activity     Alcohol use: Yes     Comment: Very little     Drug use: No     Sexual activity: Yes     Partners: Female     Comment:    Lifestyle     Physical activity     Days per week: Not on file     Minutes per session: Not on file     Stress: Not on file   Relationships     Social connections     Talks on phone: Not on file     Gets together: Not on file     Attends [employer] service: Not on file     Active member of club or organization: Not on file     Attends meetings of clubs or organizations: Not on file     Relationship status: Not on file     Intimate partner violence     Fear of current or ex partner: Not on file     Emotionally abused: Not on file     Physically abused: Not on file     Forced sexual activity: Not on file   Other Topics Concern     Not on file   Social History Narrative     Not on file       Active Ambulatory Problems     Diagnosis Date Noted     Microalbuminuria      Hypertension      Type 2 diabetes mellitus with hyperglycemia, without long-term current use of insulin (H)      Dyslipidemia 08/26/2016     Resolved Ambulatory Problems     Diagnosis Date Noted     Hyperlipidemia      Chest Pain      Acute Upper Respiratory Infection      Elevated liver enzymes 05/28/2017     Past Medical History:   Diagnosis Date     Diabetes mellitus (H)        Family History   Problem Relation Age of Onset     Breast cancer Mother      Diabetes Father        Objective:     /60 (Patient Site: Right Arm, Patient Position: Sitting, Cuff Size: Adult Large)   Pulse (!) 102   Wt 171 lb 3.2 oz (77.7 kg)   SpO2 95%   BMI 26.40 kg/m      Patient is alert, in no obvious distress.   Skin: Warm, dry.    Lungs:  Clear to auscultation. Respirations even and unlabored.  No wheezing or rales noted.   Heart:  Regular rate and rhythm.  No murmurs, S3, S4, gallops, or rubs.    Abdomen: Soft, nontender.  No organomegaly. Bowel  sounds normoactive. No guarding or masses noted.

## 2021-06-15 NOTE — PROGRESS NOTES
"Assessment: Efrain is here alone to discuss starting insulin and blood sugar control.    He has been working hard to maintain his meal planning.  He is watching hi intake of bread and pasta.  Potatoes really seem to spike his readings.  Stated he and his wife do go out to eat about once per week.    He is maintaining his activity, walking and climbing stairs at home and at work.  Stated he puts in at least 10,000 steps daily.    He checks his glucose once daily, fasting.  Stated since Thanksgiving his readings have been slowly creeping up.  This week readings have been 217/155/200/180.    He is currently taking glyburide 10mg BID before meals, Metformin 1000mg BID and linagliptin 5mg in the morning.  Despite his efforts, he is having difficulty controlling his glucose and understands that insulin would be the best choice at this point.  Based on his current weight and A1C, he should take 16 units.  I decide to start him at 10 units and confirm this with his primary.  Reviewed pen storage, administration technique, site selection, site rotation, needle disposal, hypoglycemia signs and treatment, timing of insulin and how it works to help control glucose.  Gave him the \"Take Your Best Shot\" handout with his dose information writin out.    Plan: Efrain will start taking his insulin tomorrow morning.  He will call me next week to discuss his glucose readings and possible adjustment to dose.  He will call sooner if having signs of hypoglycemia.    Subjective and Objective:      Ced GAMBLE Roseannaliliana is referred by Raquel Reddy CNP for Diabetes Education.     Lab Results   Component Value Date    HGBA1C 8.8 (H) 12/19/2017         Current diabetes medications:  Glyburide 10mg BID, linagliptin 5mg daily and Metformin 1000mg BID    Goals       HEMOGLOBIN A1C < 8.0            Goals for 01.02.18:  Monitoring-  Check glucose fasting and before bedtime.  Goal is to have FBG be around 130 consistently.  Medication-  Start Tresiba U-100 " every morning.  Call next week to discuss BG and possible adjustment.  If having low BG call sooner.    Goals for 06.13.17 assessed 01.02.18:  Medication-  Take morning pills before breakfast and evening pills before dinner-Met  Monitoring-  Try checking glucose before and after dinner for more information if your medication is working well-Met  Nutrition-  Continue with your current meal plan-Met            Follow up:   CDE (certified diabetic educator)      Education:     Monitoring   Meter (per above goals): Assessed and Discussed  Monitoring: Assessed and Discussed  BG goals: Assessed and Discussed    Nutrition Management  Nutrition Management: Assessed and Discussed  Weight: Assessed  Portions/Balance: Assessed and Discussed  Carb ID/Count: Assessed and Discussed  Label Reading: Assessed and Competent  Heart Healthy Fats: Assessed and Discussed  Menu Planning: Assessed and Discussed  Dining Out: Assessed and Discussed  Physical Activity: Assessed and Discussed  Medications: Assessed and Discussed  Orals: Assessed and Discussed  Injected Medications: Assessed, Discussed and Literature provided   Storage/Exp:Assessed, Discussed and Literature provided   Site Rotation: Assessed, Discussed and Literature provided   Sites Assessed: no    Diabetes Disease Process: Assessed and Discussed    Acute Complications: Prevent, Detect, Treat:  Hypoglycemia: Assessed, Discussed and Literature provided  Hyperglycemia: Assessed and Discussed  Sick Days: Not addressed  Driving: Needs instruction/review at follow-up and Not addressed    Chronic Complications  Foot Care:Needs instruction/review at follow-up and Not addressed  Skin Care: Needs instruction/review at follow-up and Not addressed  Eye: Needs instruction/review at follow-up and Not addressed  ABC: Needs instruction/review at follow-up and Not addressed  Teeth:Needs instruction/review at follow-up and Not addressed  Goal Setting and Problem Solving: Assessed and  Discussed  Barriers: Assessed and Discussed  Psychosocial Adjustments: Assessed and Discussed      Time spent with the patient: 30 minutes for diabetes education and counseling.   Previous Education: yes  Visit Type:DSMT  Hours Remaining: DSMT 1.5 and MNT 2      Sidra Reynaga  1/2/2018                Discharged

## 2021-06-15 NOTE — TELEPHONE ENCOUNTER
Central PA team  [phone]  Pool: HE PA MED ([id number])          PA has been initiated.       PA form completed and faxed insurance via Cover My Meds     Key:  [id number]     Medication:  XULTOPHY     Insurance:  [facility] [place]        Response will be received via fax and may take up to 5-10 business days depending on plan

## 2021-06-15 NOTE — PROGRESS NOTES
Medication Therapy Management (MTM) Encounter    Assessment:                                                      Type 2 diabetes: Per reported blood sugar values, blood sugars sound overall borderline controlled.  He would be a great candidate for CGM, but was not interested today.  It is possible that his blood sugars are spiking after meals, since he is on a lower dose of Victoza.  His current Xultophy dose =24 units of Tresiba and 0.8 mg of Victoza.  He likely needs more Victoza.  Recommended that he self titrate by 1 to 2 units every 5 days until his fasting blood sugars are consistently less than 150, ideally less than 130.  He was agreeable.  I will call him in 3 weeks to see where he is at with his titration.    Plan:                                                     Increase Xultophy 5 1 to 2 units every 5 days until fasting blood sugars are <150-130 mg/dL    Follow Up  3-week phone follow-up    Subjective & Objective                                                       Ced Fajardo is a 65 y.o. male called for a follow up visit for Medication Therapy Management. He was referred to me from Raquel Reddy CNP    Reason for visit: diabetes follow up -- patient had A1c yesterday   Medication Adherence/Access: No adherence concerns    Type 2 Diabetes: Currently taking Xultophy 24 units once daily and metformin 1000 mg two times a day.    Increased from 22 units on 12/11/20. Xultophy was started by DME.  Was on glyburide before.   No diarrhea.    Has had diabetes for 10 years - dad had diabetes.   Tests BG 1 times daily fasting AM. Reports values are all over the place and range from 108, 118 to 140-160s.  He was driving and did not have actual values with him.  He was on a professional CGM in the past. Was not really interested in a CGM at last MTM appt, but was recommended to think about it.   Last A1c checked yesterday = 9.2%, previously 10/30/2020 = 9.9%  Hypoglycemia none  Hyperglycemia  "symptoms: none  Is taking pravastatin 40 mg daily. Last LDL checked 10/30/2020  Is taking aspirin 81 mg for primary prevention.   Normal renal function  Wt Readings from Last 3 Encounters:   03/03/21 171 lb 3.2 oz (77.7 kg)   10/30/20 173 lb 1.6 oz (78.5 kg)   10/11/19 164 lb 12.8 oz (74.8 kg)         PMH: reviewed in EPIC   Allergies/ADRs: reviewed in EPIC   Alcohol: Reviewed in epic  Tobacco:   Social History     Tobacco Use   Smoking Status Never Smoker   Smokeless Tobacco Never Used     Today's Vitals: There were no vitals filed for this visit.  ----------------    The patient declined an after visit summary    I spent 10 minutes with this patient today;   All changes were made via collaborative practice agreement with Raquel Reddy CNP. A copy of the visit note was provided to the patient's provider.     Julieta Alas, Pharm.D., BCACP  Medication Therapy Management Pharmacist  [facility] [facility]    Telemedicine Visit Details    Type of service:  Telephone     Start Time: 3 PM  End Time: 3:10 PM    Originating Location (pt. Location): Home    Distant Location (provider location):  [place] [facility]NAG[facility]    Mode of Communication: Telephone    Current Outpatient Medications   Medication Sig Dispense Refill     aspirin 81 MG EC tablet Take 81 mg by mouth daily.       BD PEDRITO 2ND GEN PEN NEEDLE 32 gauge x 5/32\" Ndle USE ONCE DAILY AS DIRECTED 100 each 2     blood glucose test (GLUCOSE BLOOD) strips Use 1 each As Directed 3 (three) times a day. Accu-Chek Jessica In Vitro Strip - Use 1 strip 3 times daily 100 strip 5     insulin degludec-liraglutide (XULTOPHY 100/3.6) 100 unit-3.6 mg /mL (3 mL) InPn Inject 24 Units under the skin daily. 15 mL 2     LANCETS MISC Use As Directed. Accu-Chek MultiClix Lancets       lisinopriL (PRINIVIL,ZESTRIL) 10 MG tablet Take 10 mg by mouth daily.       metFORMIN (GLUCOPHAGE) 1000 MG tablet TAKE 1 TABLET(1000 MG) BY MOUTH EVERY 12 HOURS " 180 tablet 2     multivitamin (ONE A DAY) per tablet Take 1 tablet by mouth daily.       pravastatin (PRAVACHOL) 40 MG tablet TAKE 1 TABLET BY MOUTH DAILY 90 tablet 3     sildenafiL (VIAGRA) 50 MG tablet Take 0.5-1 tablets (25-50 mg total) by mouth daily as needed for erectile dysfunction. 30 tablet 5     No current facility-administered medications for this visit.         Medication Therapy Recommendations  Type 2 diabetes mellitus with hyperglycemia, with long-term current use of insulin (H)    Current Medication: insulin degludec-liraglutide (XULTOPHY 100/3.6) 100 unit-3.6 mg /mL (3 mL) InPn   Rationale: Dose too low - Dosage too low - Effectiveness   Recommendation: Increase Dose - start self-titration   Status: Accepted per CPA

## 2021-06-15 NOTE — TELEPHONE ENCOUNTER
Refill Approved    Rx renewed per Medication Renewal Policy. Medication was last renewed on 12/11/2020  OV virtual 1/4/2021  Amie Pearson, [facility] Triage/Med Refill 2/9/2021     Requested Prescriptions   Pending Prescriptions Disp Refills     insulin degludec-liraglutide (XULTOPHY 100/3.6) 100 unit-3.6 mg /mL (3 mL) InPn 15 mL 2     Sig: Inject 24 Units under the skin daily.       Insulin/GLP-1 Refill Protocol Passed - 2/8/2021  2:14 PM        Passed - Visit with PCP or prescribing provider visit in last 6 months     Last office visit with prescriber/PCP: 10/30/2020 OR same dept: 10/30/2020 Raquel Reddy CNP OR same specialty: 10/30/2020 Raquel Reddy CNP Last physical: Visit date not found Last MTM visit: Visit date not found     Next appt within 3 mo: Visit date not found  Next physical within 3 mo: Visit date not found  Prescriber OR PCP: Raquel Reddy CNP  Last diagnosis associated with med order: 1. Type 2 diabetes mellitus with hyperglycemia, with long-term current use of insulin (H)  - insulin degludec-liraglutide (XULTOPHY 100/3.6) 100 unit-3.6 mg /mL (3 mL) InPn; Inject 24 Units under the skin daily.  Dispense: 15 mL; Refill: 2    If protocol passes may refill for 6 months if within 3 months of last provider visit (or a total of 9 months).              Passed - A1C in last 6 months     Hemoglobin A1c   Date Value Ref Range Status   10/30/2020 9.9 (H) <=5.6 % Final     Comment:     Normal <5.7% Prediabete 5.7-6.4% Diabletes 6.5% or higher - adopted from ADA consensus guidelines               Passed - Microalbumin in last year     Microalbumin, Random Urine   Date Value Ref Range Status   10/30/2020 4.07 (H) 0.00 - 1.99 mg/dL Final                  Passed - Blood pressure in last year     BP Readings from Last 1 Encounters:   10/30/20 132/68             Passed - Creatinine done in last year     Creatinine   Date Value Ref Range Status   10/30/2020 0.77 0.70 - 1.30 mg/dL Final

## 2021-06-15 NOTE — PROGRESS NOTES
Assessment and Plan:     1. Hematuria  Urinalysis-UC if Indicated    nitrofurantoin, macrocrystal-monohydrate, (MACROBID) 100 MG capsule   2. Type 2 diabetes mellitus with hyperglycemia, without long-term current use of insulin       Differentials include urinary tract infection, nephrolithiasis, cancer.  Will treat for possible infection with nitrofurantoin 100 mg twice daily for 7 days.  Educated on its indications and side effects.  He will increase his fluid intake.  If urine culture is normal, will obtain CT stone run for further evaluation and provide a referral to urology.  He is content with the plan.    Subjective:     Ced is a 62 y.o. male presenting to the clinic for concerns for hematuria.  Patient states 2 weeks ago, he developed hematuria which lasted for 2 days.  He states it returned to 3 days later. His urine is pink in color.  He denies dysuria, urinary frequency, urinary urgency, low back pain, fever. He did have some pelvic discomfort at the onset of symptoms.  He is  and has no concerns for STDs.  He denies history of nephrolithiasis.  He denies history of smoking.    Review of Systems: A complete 14 point review of systems was obtained and is negative or as stated in the history of present illness.    Social History     Social History     Marital status:      Spouse name: N/A     Number of children: N/A     Years of education: N/A     Occupational History     Not on file.     Social History Main Topics     Smoking status: Never Smoker     Smokeless tobacco: Never Used     Alcohol use Yes      Comment: Very little     Drug use: No     Sexual activity: Yes     Partners: Female      Comment:      Other Topics Concern     Not on file     Social History Narrative       Active Ambulatory Problems     Diagnosis Date Noted     Microalbuminuria      Hypertension      Type 2 diabetes mellitus      Dyslipidemia 08/26/2016     Elevated liver enzymes 05/28/2017     Resolved  "Ambulatory Problems     Diagnosis Date Noted     Hyperlipidemia      Chest Pain      Acute Upper Respiratory Infection      Past Medical History:   Diagnosis Date     Diabetes mellitus        Family History   Problem Relation Age of Onset     Breast cancer Mother      Diabetes Father        Objective:     /60  Pulse 80  Ht 5' 6.5\" (1.689 m)  Wt 178 lb (80.7 kg)  BMI 28.3 kg/m2    Patient is alert, in no obvious distress.   Skin: Warm, dry.   Lungs:  Clear to auscultation. Respirations even and unlabored.  No wheezing or rales noted.   Heart:  Regular rate and rhythm.  No murmurs.   Abdomen: Soft, nontender.  No organomegaly. Bowel sounds normoactive. No guarding or masses noted. CVA tenderness is negative bilaterally.     LABORATORY: Urinalysis ordered showed large blood.  Urine micro and culture ordered.             "

## 2021-06-16 NOTE — TELEPHONE ENCOUNTER
Telephone Encounter by Kelly Adan at 1/14/2019  2:05 PM     Author: Kelly Adan Service: -- Author Type: --    Filed: 1/14/2019  2:14 PM Encounter Date: 1/14/2019 Status: Signed    : Kelly Adan       PA APPROVED:    Approval start date:12/15/2018  Approval end date:01/13/2022    Called and let patient know this was approved.  Also called pharmacy to have them reprocess prescription but it timed out on their end.  They will call PA team if they need anything further from us.

## 2021-06-16 NOTE — TELEPHONE ENCOUNTER
Telephone Encounter by Kelly Adan at 1/14/2019  1:47 PM     Author: Kelly Adan Service: -- Author Type: --    Filed: 1/14/2019  1:48 PM Encounter Date: 1/14/2019 Status: Signed    : Kelly Adan       [facility] team  [phone]    PA has been initiated.

## 2021-06-16 NOTE — PROGRESS NOTES
Medication Therapy Management (MTM) Encounter    Assessment:                                                      Type 2 diabetes: Per reported blood sugar values, blood sugars sound better and at goal 80 to 130 mg/dL.  We will have him continue current dose and recheck A1c in June. He would be a great candidate for CGM, but has not been interested in the past.  His current Xultophy dose = 30 units of Tresiba and 1.08 mg of Victoza.  He likely needs more Victoza at some point, but at least he is closer to a therapeutic dose now.  Recommended that if his blood sugars consistently go above 130 again, then increase Xultophy to 32 units.  He did not need a refill today.       Follow Up  3 months A1c check      Subjective & Objective                                                       Ced Fajardo is a 65 y.o. male called for a follow up visit for Medication Therapy Management. He was referred to me from Raquel Reddy CNP    Reason for visit: Xultophy follow-up    Type 2 Diabetes: Currently taking Xultophy 30 units once daily and metformin 1000 mg two times a day.  At last MTM appt I recommended a self-titration of Xultophy by 1-2 units every 5 days until fastinkg AM -150 mg/dL.  He was previously on 24 units daily.  Xultophy was started by DME.  Was on glyburide before.   No diarrhea.    Has had diabetes for 10 years - dad had diabetes.   Tests BG 1 times daily fasting AM. Reports values are now consistently 130, 110, 107  He was on a professional CGM in the past. Was not really interested in a CGM at last MTM appt, but was recommended to think about it.   Last A1c checked 3/3/21 = 9.2%, previously 10/30/2020 = 9.9%  Hypoglycemia none  Hyperglycemia symptoms: none  Is taking pravastatin 40 mg daily. Last LDL checked 10/30/2020  Is taking aspirin 81 mg for primary prevention.   Normal renal function      Wt Readings from Last 3 Encounters:   03/03/21 171 lb 3.2 oz (77.7 kg)   10/30/20 173 lb 1.6 oz (78.5  "kg)   10/11/19 164 lb 12.8 oz (74.8 kg)       PMH: reviewed in EPIC   Allergies/ADRs: reviewed in EPIC   Alcohol: Reviewed in epic  Tobacco:   Social History     Tobacco Use   Smoking Status Never Smoker   Smokeless Tobacco Never Used     Today's Vitals: There were no vitals filed for this visit.  ----------------    The patient declined an after visit summary    I spent 5 minutes with this patient today;   All changes were made via collaborative practice agreement with Raquel Reddy CNP. A copy of the visit note was provided to the patient's provider.     Julieta Alas, Pharm.D., BCACP  Medication Therapy Management Pharmacist  [place] [facility]    Telemedicine Visit Details    Type of service:  Telephone     Start Time: 3:32 PM  End Time: 3:37 PM    Originating Location (pt. Location): Home    Distant Location (provider location):  [place] [facility]ICATION THERAPY MANAGEMENT [facility]    Mode of Communication: Telephone    Current Outpatient Medications   Medication Sig Dispense Refill     aspirin 81 MG EC tablet Take 81 mg by mouth daily.       BD PEDRITO 2ND GEN PEN NEEDLE 32 gauge x 5/32\" Ndle USE ONCE DAILY AS DIRECTED 100 each 2     blood glucose test (GLUCOSE BLOOD) strips Use 1 each As Directed 3 (three) times a day. Accu-Chek Jessica In Vitro Strip - Use 1 strip 3 times daily 100 strip 5     insulin degludec-liraglutide (XULTOPHY 100/3.6) 100 unit-3.6 mg /mL (3 mL) InPn Inject 30 Units under the skin daily. 15 mL 2     LANCETS MISC Use As Directed. Accu-Chek MultiClix Lancets       lisinopriL (PRINIVIL,ZESTRIL) 10 MG tablet Take 10 mg by mouth daily.       metFORMIN (GLUCOPHAGE) 1000 MG tablet TAKE 1 TABLET(1000 MG) BY MOUTH EVERY 12 HOURS 180 tablet 2     multivitamin (ONE A DAY) per tablet Take 1 tablet by mouth daily.       pravastatin (PRAVACHOL) 40 MG tablet TAKE 1 TABLET BY MOUTH DAILY 90 tablet 3     sildenafiL (VIAGRA) 50 MG tablet Take 0.5-1 tablets (25-50 mg total) by mouth daily " as needed for erectile dysfunction. 30 tablet 5     No current facility-administered medications for this visit.         Medication Therapy Recommendations  No medication therapy recommendations to display

## 2021-06-17 NOTE — PROGRESS NOTES
Assessment: Efrain is here alone today for follow-up.  His A1C has increased since starting on Tresiba.    Efrain is taking Glyburide 10mg before breakfast and before dinner.  He takes Metformin 1000mg at the same times.  Also, take Tradjenta 5mg in the morning.  He has been taking Tresiba at 10 units since January.  Stated he never misses his oral medications.  He has, on occasion, not taken his Tresiba because his fasting readings were below 100.  We talked about GLP-1 medications.  How they work to control glucose, possible side effects and how they are taken.  This is something he would like to look into more.    He is checking his blood sugars once daily in the morning.  He brings in a report and readings have been running 120's-130's.  The beginning of April he went on a work trip to [place] and his sugars were elevated while there.  Since coming back his sugars have been below 120.    He has started using a meal service for dinners and this is working well for him.  Stated everything is portion controled and a lot more vegetables than he used to eat.  Stated he and his wife are enjoying trying different foods and learning to cook with different spices.  He was having granola for breakfast on a daily basis.  When he returned from his work trip he switched to greek yogurt.  He is not a big fan of this, but his blood sugars have improved.  Discussed trying cottage cheese, which he likes more, and see how that goes.    Plan: Efrain will check with his insurance about GLP-1 coverage and get back to me.  Follow-up dependent on GLP-1 coverage, likely 2-3 weeks.    Subjective and Objective:      Ced GAMBLE Scotty is referred by Raquel Reddy CNP for Diabetes Education.     Lab Results   Component Value Date    HGBA1C 9.1 (H) 04/12/2018         Goals       HEMOGLOBIN A1C < 8.0            Goals for 5.08.18:  Medication-  Call insurance about GLP-1 coverage  Decrease Tresiba to 8 units      Goals for 01.02.18 assessed  5.08.18:  Monitoring-  Check glucose fasting and before bedtime.  Goal is to have FBG be around 130 consistently.-Unmet  Medication-  Start Tresiba U-100 every morning.  Call next week to discuss BG and possible adjustment.  If having low BG call sooner.-Unmet    Goals for 06.13.17 assessed 01.02.18:  Medication-  Take morning pills before breakfast and evening pills before dinner-Met  Monitoring-  Try checking glucose before and after dinner for more information if your medication is working well-Met  Nutrition-  Continue with your current meal plan-Met            Follow up:   CDE (certified diabetic educator)      Education:     Monitoring   Meter (per above goals): Assessed and Discussed  Monitoring: Assessed and Discussed  BG goals: Assessed and Discussed    Nutrition Management  Nutrition Management: Assessed and Discussed  Weight: Assessed and Discussed  Portions/Balance: Assessed and Discussed  Carb ID/Count: Assessed and Discussed  Label Reading: Assessed and Competent  Heart Healthy Fats: Assessed and Discussed  Menu Planning: Assessed and Discussed  Dining Out: Assessed and Discussed  Physical Activity: Assessed and Discussed  Medications: Assessed and Discussed  Orals: Assessed and Discussed  Injected Medications: Assessed, Discussed and Literature provided   Storage/Exp:Assessed and Discussed   Site Rotation: Assessed and Discussed   Sites Assessed: no    Diabetes Disease Process: Assessed and Discussed    Acute Complications: Prevent, Detect, Treat:  Hypoglycemia: Assessed and Discussed  Hyperglycemia: Assessed and Discussed  Sick Days: Needs instruction/review at follow-up and Not addressed  Driving: Needs instruction/review at follow-up and Not addressed    Chronic Complications  Foot Care:Needs instruction/review at follow-up and Not addressed  Skin Care: Needs instruction/review at follow-up and Not addressed  Eye: Needs instruction/review at follow-up and Not addressed  ABC: Needs  instruction/review at follow-up and Not addressed  Teeth:Needs instruction/review at follow-up and Not addressed  Goal Setting and Problem Solving: Assessed and Discussed  Barriers: Assessed and Discussed  Psychosocial Adjustments: Assessed and Discussed      Time spent with the patient: 30 minutes for diabetes education and counseling.   Previous Education: yes  Visit Type:DSMT  Hours Remaining: DSMT 1.5 and MNT 2      Sidra Reynaga  5/10/2018

## 2021-06-17 NOTE — PROGRESS NOTES
Assessment and Plan:     1. Type 2 diabetes mellitus  Patient's A1c has worsened.  Will refer to diabetes education and endocrinology for further evaluation and treatment options.  He will continue to consume a healthy diet and exercise.  - Glycosylated Hemoglobin A1c  - Microalbumin, Random Urine  - glyBURIDE (DIABETA) 5 MG tablet; Take 2 tablets (10 mg total) by mouth 2 (two) times a day.  Dispense: 360 tablet; Refill: 0  - linagliptin (TRADJENTA) 5 mg Tab; TAKE 1 TABLET BY MOUTH DAILY WITH OR WITHOUT FOOD.  Dispense: 90 tablet; Refill: 0    2. Hypertension  Blood pressure is well controlled.  He continues lisinopril.    3. Dyslipidemia  He is not fasting today.  I encouraged him to fast for his follow-up for his next diabetes check.    4. Overweight  The following high BMI interventions were performed this visit: exercise promotion: strength training, exercise promotion: stretching and nutrition therapy      Subjective:     Ced is a 62 y.o. male presenting to the clinic for a diabetes check.  Patient has type 2 diabetes.  Last hemoglobin A1c was 8.8% on 12/19/17.  He is consuming a healthy diet.  He has been eating hello fresh and plated meals.  He exercises on a daily basis by walking at least 4-5 miles and using the elliptical.  His last eye exam was last December.  He has been checking his blood sugars.  Fasting levels in the morning have ranged from .  He is to currently taking glyburide 10 mg twice daily, metformin 1000 mg twice daily, and Tradjenta 5 mg daily.  He has dyslipidemia and is taking pravastatin 40 mg daily.  He is not fasting today.  Last cholesterol check was on 5/12/17 with a total cholesterol 162, triglycerides 183, LDL 80, HDL 43.  He has hypertension which is controlled lisinopril.  He is feeling well today.  He denies symptoms of hypoglycemia.    Review of Systems: A complete 14 point review of systems was obtained and is negative or as stated in the history of present  "illness.    Social History     Social History     Marital status:      Spouse name: N/A     Number of children: N/A     Years of education: N/A     Occupational History     Not on file.     Social History Main Topics     Smoking status: Never Smoker     Smokeless tobacco: Never Used     Alcohol use Yes      Comment: Very little     Drug use: No     Sexual activity: Yes     Partners: Female      Comment:      Other Topics Concern     Not on file     Social History Narrative       Active Ambulatory Problems     Diagnosis Date Noted     Microalbuminuria      Hypertension      Type 2 diabetes mellitus      Dyslipidemia 08/26/2016     Elevated liver enzymes 05/28/2017     Resolved Ambulatory Problems     Diagnosis Date Noted     Hyperlipidemia      Chest Pain      Acute Upper Respiratory Infection      Past Medical History:   Diagnosis Date     Diabetes mellitus        Family History   Problem Relation Age of Onset     Breast cancer Mother      Diabetes Father        Objective:     /78  Pulse 70  Ht 5' 6.5\" (1.689 m)  Wt 180 lb (81.6 kg)  BMI 28.62 kg/m2    Patient is alert, in no obvious distress.   Skin: Warm, dry.    Lungs:  Clear to auscultation. Respirations even and unlabored.  No wheezing or rales noted.   Heart:  Regular rate and rhythm.  No murmurs, S3, S4, gallops, or rubs.    Abdomen: Soft, nontender.  No organomegaly. Bowel sounds normoactive. No guarding or masses noted.   Musculoskeletal:  Monofilament testing is normal bilaterally.                "

## 2021-06-17 NOTE — TELEPHONE ENCOUNTER
Telephone Encounter by Ameena Miller at 3/9/2021  2:10 PM     Author: Ameena Miller Service: -- Author Type: --    Filed: 3/9/2021  2:11 PM Encounter Date: 3/5/2021 Status: Signed    : Ameena Miller       No PA needed, medication is covered under plan.  Called pharmacy and made them aware to call the help desk if they have further issues.

## 2021-06-18 NOTE — PROGRESS NOTES
"[facility]  ENDOCRINOLOGY    Diabetes Note 6/18/2018    Ced Fajardo, 1955, [id number]          Reason for visit      1. Type 2 diabetes mellitus with hyperglycemia, without long-term current use of insulin        HPI     Ced Fajardo is a very pleasant 62 y.o. old male who presents for follow up.  SUMMARY:  Efrain is here today in f/u for DM 2.  He has not been seen in clinic for about a year.  He has been working with his PCP and CDE to get his A1c down and to get his blood sugars under control.  He was just taking basal insulin and Metformin.  It was decided that he would benefit from getting started on a GLP-1.  This was started and then he was transitioned to Xultophy, of which he is now taking 12 \"units\".  He is also taking Metformin 1000 mg two times a day. He did not bring in a meter today, but reports that his FBS are running between 105 and 120.  His last A1c was 9.1.  He reports no problems with his feet, and that his most recent eye exam with Dr Guevara found no retinopathy.  He is walking every morning.  He and his wife are also using a food program somewhat like [employer] for their evening meals.  They are healthy, balanced meals with specific portions, and they are finding that this is not only helpful to them with their time management, but has also improved his eating habits and provides portion control.      Blood glucose data:  Unavailable    Past Medical History     Patient Active Problem List   Diagnosis     Microalbuminuria     Hypertension     Type 2 diabetes mellitus (H)     Dyslipidemia     Elevated liver enzymes        Family History       family history includes Breast cancer in his mother; Diabetes in his father.    Social History      reports that he has never smoked. He has never used smokeless tobacco. He reports that he drinks alcohol. He reports that he does not use illicit drugs.      Review of Systems     Patient has no polyuria or polydipsia, no chest pain, dyspnea or " "TIA's, no numbness, tingling or pain in extremities  Remainder negative except as noted in HPI.    Vital Signs     /66  Ht 5' 6.5\" (1.689 m)  Wt 172 lb 14.4 oz (78.4 kg)  BMI 27.49 kg/m2  Wt Readings from Last 3 Encounters:   06/15/18 172 lb 14.4 oz (78.4 kg)   05/08/18 175 lb 8 oz (79.6 kg)   04/12/18 180 lb (81.6 kg)       Physical Exam     Constitutional:  Well developed, Well nourished  HENT:  Normocephalic,   Neck: Thyroid normal, No lymph nodes, Supple  Eyes:  PERRL, Conjunctiva pink  Respiratory:  Normal breath sounds, No respiratory distress  Cardiovascular:  Normal heart rate, Normal rhythm, No murmurs  GI:  Bowel sounds normal, Soft, No tenderness  Musculoskeletal:  No gross deformity or lesions, normal dorsalis pedis pulses  Skin: No acanthosis nigricans, lipoatrophy or lipodystrophy  Neurologic:  Alert & oriented x 3, nonfocal  Psychiatric:  Affect, Mood, Insight appropriate  Diabetic foot exam: no ulcers, charcot's or high risk calluses, Normal monofilament exam        Assessment     1. Type 2 diabetes mellitus with hyperglycemia, without long-term current use of insulin        Plan     Fortunately for Efrain, it seems as though we have found a good solution to his poorly controled blood sugars.  He will stick with the Xultophy 12 units, and the Metformin 1000 mg two times a day. He and his wife are going to continue with their program \"Plated Foods,\" and he will continue his daily walking.  He will f/u with me as necessary and will continue with his PCP. Time spent with pt today: 25 min with >50% spent in counseling and coordination of care.        Catherine VELÁSQUEZ Endocrinology  6/18/2018  7:31 AM    This note has been dictated using voice recognition software.  Any grammatical or context distortions are unintentional and inherent to the software.       Lab Results     Hemoglobin A1c   Date Value Ref Range Status   04/12/2018 9.1 (H) 3.5 - 6.0 % Corrected     Comment:     This is a corrected " "result. Previous result was 11.4 % on 4/12/2018 at 0850 CDT   12/19/2017 8.8 (H) 3.5 - 6.0 % Final   05/12/2017 9.8 (H) 3.5 - 6.0 % Final   08/26/2016 7.7 (H) 3.5 - 6.0 % Corrected     Comment:     This is a corrected result. Previous result was 3.5 % on 8/26/2016 at 0855 CDT   04/20/2016 9.5 (H) 3.5 - 6.0 % Final     Creatinine   Date Value Ref Range Status   05/12/2017 0.96 0.70 - 1.30 mg/dL Final   08/26/2016 0.94 0.70 - 1.30 mg/dL Final   09/29/2015 0.96 0.70 - 1.30 mg/dL Final     Microalbumin, Random Urine   Date Value Ref Range Status   04/12/2018 1.87 0.00 - 1.99 mg/dL Final       Cholesterol   Date Value Ref Range Status   05/12/2017 162 <=199 mg/dL Final     HDL Cholesterol   Date Value Ref Range Status   05/12/2017 43 >=40 mg/dL Final     LDL Calculated   Date Value Ref Range Status   05/12/2017 80 <=129 mg/dL Final     Triglycerides   Date Value Ref Range Status   05/12/2017 193 (H) <=149 mg/dL Final       Lab Results   Component Value Date    ALT 88 (H) 05/12/2017    AST 48 (H) 05/12/2017    ALKPHOS 66 05/12/2017    BILITOT 0.6 05/12/2017         Current Medications     Outpatient Medications Prior to Visit   Medication Sig Dispense Refill     aspirin 81 MG EC tablet Take 81 mg by mouth daily.       BD ULTRA-FINE PEDRITO PEN NEEDLE 32 gauge x 5/32\" Ndle USE ONCE DAILY 100 each 2     blood glucose test (GLUCOSE BLOOD) strips Use 1 each As Directed 3 (three) times a day. Accu-Chek Jessica In Vitro Strip - Use 1 strip 3 times daily 100 strip 5     insulin degludec-liraglutide (XULTOPHY 100/3.6) 100 unit-3.6 mg /mL (3 mL) InPn Inject 10 Units under the skin daily. (Patient taking differently: Inject 12 Units under the skin daily. ) 3 mL 2     LANCETS MISC Use As Directed. Accu-Chek MultiClix Lancets       lisinopril (PRINIVIL,ZESTRIL) 5 MG tablet TAKE 1 TABLET BY MOUTH EVERY DAY 90 tablet 3     metFORMIN (GLUCOPHAGE) 1000 MG tablet TAKE 1 TABLET BY MOUTH EVERY 12 HOURS 180 tablet 1     multivitamin (ONE A DAY) " per tablet Take 1 tablet by mouth daily.       pravastatin (PRAVACHOL) 40 MG tablet Take 1 tablet (40 mg total) by mouth daily. 90 tablet 3     glyBURIDE (DIABETA) 5 MG tablet Take 2 tablets (10 mg total) by mouth 2 (two) times a day. 360 tablet 0     sildenafil (VIAGRA) 50 MG tablet Take 0.5-1 tablets (25-50 mg total) by mouth daily as needed for erectile dysfunction. 6 tablet 5     linagliptin (TRADJENTA) 5 mg Tab TAKE 1 TABLET BY MOUTH DAILY WITH OR WITHOUT FOOD. 90 tablet 0     metFORMIN (GLUCOPHAGE) 1000 MG tablet TAKE 1 TABLET BY MOUTH EVERY 12 HOURS 180 tablet 0     pravastatin (PRAVACHOL) 40 MG tablet TAKE 1 TABLET BY MOUTH EVERY DAY 90 tablet 3     pravastatin (PRAVACHOL) 40 MG tablet TAKE 1 TABLET BY MOUTH EVERY DAY 90 tablet 2     No facility-administered medications prior to visit.

## 2021-06-19 NOTE — LETTER
Letter by Raquel Reddy CNP at      Author: Raquel Reddy CNP Service: -- Author Type: --    Filed:  Encounter Date: 4/9/2019 Status: (Other)         04/09/19     Ced Fajardo  [address]  [place] [address]          Dear Raquel Coughlin NP has recommended you schedule a Medication Therapy Management (MTM) appointment to review your diabetes and medications. MTM is designed to help you get the most of out of your medications.     During an MTM appointment, you will meet with a specially trained pharmacist to review all of your medications, both prescription and over-the-counter. They will make sure your medications are the best choice for you, safe, and working well. The MTM pharmacist works together with you and your doctor to help you understand your medications, solve any problems related to your medications, and help you meet your health goals.     To make an appointment, please call the MTM scheduling line at [phone] and toll free [phone].      We look forward to hearing from you!    Sincerely,       Julieta Alas, Pharm.D., [facility]  Medication Therapy Management Pharmacist  [facility]

## 2021-06-19 NOTE — LETTER
Letter by Raquel Reddy CNP at      Author: Raquel Reddy CNP Service: -- Author Type: --    Filed:  Encounter Date: 4/3/2019 Status: (Other)         Ced Fajardo  [address]  [place] [address]             April 3, 2019         Dear Mr. Fajardo,    Below are the results from your recent visit:    Resulted Orders   Lipid Cascade   Result Value Ref Range    Cholesterol 145 <=199 mg/dL    Triglycerides 139 <=149 mg/dL    HDL Cholesterol 53 >=40 mg/dL    LDL Calculated 64 <=129 mg/dL    Patient Fasting > 8hrs? No    PSA (Prostatic-Specific Antigen), Annual Screen   Result Value Ref Range    PSA 2.6 0.0 - 4.5 ng/mL    Narrative    Method is Abbott Prostate-Specific Antigen (PSA)  Standard-WHO 1st International (90:10)   Comprehensive Metabolic Panel   Result Value Ref Range    Sodium 137 136 - 145 mmol/L    Potassium 4.3 3.5 - 5.0 mmol/L    Chloride 102 98 - 107 mmol/L    CO2 24 22 - 31 mmol/L    Anion Gap, Calculation 11 5 - 18 mmol/L    Glucose 218 (H) 70 - 125 mg/dL    BUN 17 8 - 22 mg/dL    Creatinine 0.92 0.70 - 1.30 mg/dL    GFR MDRD Af Amer >60 >60 mL/min/1.73m2    GFR MDRD Non Af Amer >60 >60 mL/min/1.73m2    Bilirubin, Total 0.4 0.0 - 1.0 mg/dL    Calcium 10.0 8.5 - 10.5 mg/dL    Protein, Total 7.3 6.0 - 8.0 g/dL    Albumin 4.1 3.5 - 5.0 g/dL    Alkaline Phosphatase 65 45 - 120 U/L    AST 20 0 - 40 U/L    ALT 29 0 - 45 U/L    Narrative    Fasting Glucose reference range is 70-99 mg/dL per  American Diabetes Association (ADA) guidelines.   Glycosylated Hemoglobin A1c   Result Value Ref Range    Hemoglobin A1c 9.7 (H) 3.5 - 6.0 %   Microalbumin, Random Urine   Result Value Ref Range    Microalbumin, Random Urine 6.94 (H) 0.00 - 1.99 mg/dL    Creatinine, Urine 136.9 mg/dL    Microalbumin/Creatinine Ratio Random Urine 50.7 (H) <=19.9 mg/g    Narrative    Microalbumin, Random Urine  <2.0 mg/dL . . . . . . . . Normal  3.0-30.0 mg/dL . . . . . . Microalbuminuria  >30.0 mg/dL . . . . . .  . Clinical  Proteinuria    Microalbumin/Creatinine Ratio, Random Urine  <20 mg/g . . . . .. . . . Normal   mg/g . . . . . . . Microalbuminuria  >300 mg/g . . . . . . . . Clinical Proteinuria         Your labs are normal except your A1C has worsened.  I recommend you schedule an appointment with the Endocrinologist.  I will also place a referral to our pharmacist who may have further suggestions on tweaking your medications and if we can add on other medications which are affordable too.      Please call with questions or contact us using [employer].    Sincerely,        Electronically signed by Raquel Reddy CNP

## 2021-06-19 NOTE — LETTER
Letter by Raquel Reddy CNP at      Author: Raquel Reddy CNP Service: -- Author Type: --    Filed:  Encounter Date: 5/1/2019 Status: (Other)        [facility]  [facility]  [place] [address]  [phone]         Ced Fajardo  [address]  [place] [address]        05/01/19    Dear Ced Fajardo,     At [facility] we care about your health and well-being. Your primary care provider is committed to ensuring you receive high quality care and has chosen a network of specialists to assist in providing that care. Recently Raquel Reddy [facility]. They have made several attempts to connect with you to assist with scheduling, however they have been unable to reach you by phone.       It is important to your overall health to follow through with the recommendation from your provider. Please call [facility] ([phone]) at your earliest convenience for assistance in scheduling an appointment.  If you have already scheduled this appointment, please disregard this notice.  Thank you for choosing [facility] for your healthcare needs.       Sincerely,     Raquel Reddy CNP /   [facility]

## 2021-06-20 ENCOUNTER — HEALTH MAINTENANCE LETTER (OUTPATIENT)
Age: 66
End: 2021-06-20

## 2021-06-23 NOTE — TELEPHONE ENCOUNTER
Who is calling:  Gurinder Whipple Pharmacist  Reason for Call:  Caller stated they called the insurance and the PA did not go through for the Xultophy. Please call Romy PECK, at [phone], to do the PA.  Date of last appointment with primary care: 4/12/18  Has the patient been recently seen:  No  Okay to leave a detailed message: No

## 2021-06-25 NOTE — TELEPHONE ENCOUNTER
Left message to call back for: pt  Information to relay to patient:  Left message to call and schedule medication check before refills are due

## 2021-06-25 NOTE — TELEPHONE ENCOUNTER
RN cannot approve Refill Request    RN can NOT refill this medication Protocol failed and NO refill given. Last office visit: Visit date not found Last Physical: Visit date not found Last MTM visit: Visit date not found Last visit same specialty: 4/12/2018 Raquel Reddy CNP.  Next visit within 3 mo: Visit date not found  Next physical within 3 mo: Visit date not found      Aba Sims, [facility] Triage/Med Refill 3/18/2019    Requested Prescriptions   Pending Prescriptions Disp Refills     metFORMIN (GLUCOPHAGE) 1000 MG tablet [Pharmacy Med Name: METFORMIN 1000MG TABLETS] 180 tablet 0     Sig: TAKE 1 TABLET BY MOUTH EVERY 12 HOURS    Metformin Refill Protocol Failed - 3/15/2019  3:00 PM       Failed - LFT or AST or ALT in last 12 months    Albumin   Date Value Ref Range Status   05/12/2017 4.2 3.5 - 5.0 g/dL Final     Bilirubin, Total   Date Value Ref Range Status   05/12/2017 0.6 0.0 - 1.0 mg/dL Final     Bilirubin, Direct   Date Value Ref Range Status   10/26/2010 0.2 <0.6 mg/dL Final     Alkaline Phosphatase   Date Value Ref Range Status   05/12/2017 66 45 - 120 U/L Final     AST   Date Value Ref Range Status   05/12/2017 48 (H) 0 - 40 U/L Final     ALT   Date Value Ref Range Status   05/12/2017 88 (H) 0 - 45 U/L Final     Protein, Total   Date Value Ref Range Status   05/12/2017 7.3 6.0 - 8.0 g/dL Final               Failed - GFR or Serum Creatinine in last 6 months    GFR MDRD Non Af Amer   Date Value Ref Range Status   05/12/2017 >60 >60 mL/min/1.73m2 Final     GFR MDRD Af Amer   Date Value Ref Range Status   05/12/2017 >60 >60 mL/min/1.73m2 Final            Failed - Visit with PCP or prescribing provider visit in last 6 months or next 3 months    Last office visit with prescriber/PCP: Visit date not found OR same dept: 4/12/2018 Raquel Reddy CNP OR same specialty: 4/12/2018 Raquel Reddy CNP Last physical: Visit date not found Last MTM visit: Visit date not found         Next appt within 3  mo: Visit date not found  Next physical within 3 mo: Visit date not found  Prescriber OR PCP: Loren Luther MD  Last diagnosis associated with med order: 1. Diabetes (H)  - metFORMIN (GLUCOPHAGE) 1000 MG tablet [Pharmacy Med Name: METFORMIN 1000MG TABLETS]; TAKE 1 TABLET BY MOUTH EVERY 12 HOURS  Dispense: 180 tablet; Refill: 0     If protocol passes may refill for 12 months if within 3 months of last provider visit (or a total of 15 months).          Failed - A1C in last 6 months    Hemoglobin A1c   Date Value Ref Range Status   04/12/2018 9.1 (H) 3.5 - 6.0 % Corrected     Comment:     This is a corrected result. Previous result was 11.4 % on 4/12/2018 at 0850 CDT              Passed - Blood pressure in last 12 months    BP Readings from Last 1 Encounters:   06/15/18 106/66            Passed - Microalbumin in last year     Microalbumin, Random Urine   Date Value Ref Range Status   04/12/2018 1.87 0.00 - 1.99 mg/dL Final

## 2021-06-25 NOTE — TELEPHONE ENCOUNTER
RN cannot approve Refill Request    RN can NOT refill this medication overdue for office visits and/or labs.    Kendall Jaime, [facility] Triage/Med Refill 3/16/2019    Requested Prescriptions   Pending Prescriptions Disp Refills     lisinopril (PRINIVIL,ZESTRIL) 5 MG tablet [Pharmacy Med Name: LISINOPRIL 5MG TABLETS] 90 tablet 0     Sig: TAKE 1 TABLET BY MOUTH EVERY DAY    Ace Inhibitors Refill Protocol Failed - 3/13/2019 11:16 AM       Failed - PCP or prescribing provider visit in past 12 months      Last office visit with prescriber/PCP: 4/12/2018 Raquel Reddy CNP OR same dept: 4/12/2018 Raquel Reddy CNP OR same specialty: 4/12/2018 Raquel Reddy CNP  Last physical: Visit date not found Last MTM visit: Visit date not found   Next visit within 3 mo: Visit date not found  Next physical within 3 mo: Visit date not found  Prescriber OR PCP: Raquel Reddy CNP  Last diagnosis associated with med order: 1. Essential hypertension  - lisinopril (PRINIVIL,ZESTRIL) 5 MG tablet [Pharmacy Med Name: LISINOPRIL 5MG TABLETS]; TAKE 1 TABLET BY MOUTH EVERY DAY  Dispense: 90 tablet; Refill: 0    If protocol passes may refill for 12 months if within 3 months of last provider visit (or a total of 15 months).            Failed - Serum Potassium in past 12 months    No results found for: LN-POTASSIUM         Failed - Serum Creatinine in past 12 months    Creatinine   Date Value Ref Range Status   05/12/2017 0.96 0.70 - 1.30 mg/dL Final            Passed - Blood pressure filed in past 12 months    BP Readings from Last 1 Encounters:   06/15/18 106/66

## 2021-06-25 NOTE — TELEPHONE ENCOUNTER
Patient is aware of needing to schedule medication check before next refill. Will contact us back.

## 2021-06-25 NOTE — TELEPHONE ENCOUNTER
Left message to call back for: pt   Information to relay to patient:  Left message to call and schedule medication check before next refill is due.

## 2021-07-03 NOTE — ADDENDUM NOTE
Addendum Note by Edwin Carpenter MLT at 2/6/2018  4:07 PM     Author: Edwin Carpenter MLT Service: -- Author Type:     Filed: 2/6/2018  4:07 PM Encounter Date: 2/6/2018 Status: Signed    : Edwin Carpenter MLT ()    Addended by: EDWIN CARPENTER on: 2/6/2018 04:07 PM        Modules accepted: Orders

## 2021-07-07 ENCOUNTER — COMMUNICATION - HEALTHEAST (OUTPATIENT)
Dept: FAMILY MEDICINE | Facility: CLINIC | Age: 66
End: 2021-07-07

## 2021-07-07 DIAGNOSIS — E11.65 TYPE 2 DIABETES MELLITUS WITH HYPERGLYCEMIA, WITHOUT LONG-TERM CURRENT USE OF INSULIN (H): ICD-10-CM

## 2021-07-07 NOTE — TELEPHONE ENCOUNTER
"Telephone Encounter by Silvestre Pelayo, RN at 7/7/2021  9:21 AM     Author: Silvestre Pelayo, RN Service: -- Author Type: Registered Nurse    Filed: 7/7/2021  9:21 AM Encounter Date: 7/7/2021 Status: Signed    : Silvestre Pelayo, RN (Registered Nurse)       RN cannot approve Refill Request    RN can NOT refill this medication   Patient request early refill. Medication last filled 11/16/20 for qty 100 refill 2. Provider to advise on request. . Last office visit: 3/3/2021 Raquel Reddy CNP Last Physical: 3/26/2019 Last MTM visit: Visit date not found Last visit same specialty: 3/3/2021 Raquel Reddy CNP.  Next visit within 3 mo: Visit date not found  Next physical within 3 mo: Visit date not found      [facility] Triage/Med Refill 7/7/2021    Requested Prescriptions   Pending Prescriptions Disp Refills   ? BD PEDRITO 2ND GEN PEN NEEDLE 32 gauge x 5/32\" Ndle [Pharmacy Med Name: B-D PEDRITO 2ND GEN PEN NDL [id number]] 100 each 2     Sig: USE ONCE DAILY AS DIRECTED       Diabetic Supplies Refill Protocol Passed - 7/7/2021  5:44 AM        Passed - Visit with PCP or prescribing provider visit in last 6 months     Last office visit with prescriber/PCP: 3/3/2021 Raquel Reddy CNP OR same dept: 3/3/2021 Raquel Reddy CNP OR same specialty: 3/3/2021 Raquel Reddy CNP  Last physical: 3/26/2019 Last MTM visit: Visit date not found   Next visit within 3 mo: Visit date not found  Next physical within 3 mo: Visit date not found  Prescriber OR PCP: Raquel Reddy CNP  Last diagnosis associated with med order: 1. Type 2 diabetes mellitus with hyperglycemia, without long-term current use of insulin (H)  - BD PEDRITO 2ND GEN PEN NEEDLE 32 gauge x 5/32\" Ndle [Pharmacy Med Name: B-D PEDRITO 2ND GEN PEN NDL [id number]]; USE ONCE DAILY AS DIRECTED  Dispense: 100 each; Refill: 2    If protocol passes may refill for 12 months if within 3 months of last provider visit (or a total of 15 months).             Passed - A1C in last 6 " months     Hemoglobin A1c   Date Value Ref Range Status   03/03/2021 9.2 (H) <=5.6 % Final

## 2021-07-23 DIAGNOSIS — Z79.4 TYPE 2 DIABETES MELLITUS WITH HYPERGLYCEMIA, WITH LONG-TERM CURRENT USE OF INSULIN (H): ICD-10-CM

## 2021-07-23 DIAGNOSIS — E11.65 TYPE 2 DIABETES MELLITUS WITH HYPERGLYCEMIA, WITH LONG-TERM CURRENT USE OF INSULIN (H): ICD-10-CM

## 2021-07-24 NOTE — TELEPHONE ENCOUNTER
Routing refill request to provider for review/approval because:  Drug not on the [facility] refill protocol.  ___________________________________________________________    Copy of Last Rx:         Last office visit with provider:  3/25/21  ___________________________________________________________    Requested Prescriptions   Pending Prescriptions Disp Refills     XULTOPHY pen [Pharmacy Med Name: XULTOPHY 100/3.6 PEN INJ 3ML] 15 mL 2     Sig: ADMINISTER 24 UNITS UNDER THE SKIN DAILY       There is no refill protocol information for this order          Rina Mobley RN 07/24/21 5:42 PM

## 2021-07-26 RX ORDER — (INSULIN DEGLUDEC AND LIRAGLUTIDE) 100; 3.6 [IU]/ML; MG/ML
INJECTION, SOLUTION SUBCUTANEOUS
Qty: 15 ML | Refills: 2 | Status: SHIPPED | OUTPATIENT
Start: 2021-07-26 | End: 2021-11-24

## 2021-08-18 ENCOUNTER — TELEPHONE (OUTPATIENT)
Dept: PHARMACY | Facility: CLINIC | Age: 66
End: 2021-08-18

## 2021-08-18 NOTE — TELEPHONE ENCOUNTER
Called to set up follow-up A1c and check in on his blood sugars.  I also want to confirm his Xultophy dose.  No answer, left voicemail.  I will try him again next week.

## 2021-10-10 ENCOUNTER — HEALTH MAINTENANCE LETTER (OUTPATIENT)
Age: 66
End: 2021-10-10

## 2021-11-04 ENCOUNTER — OFFICE VISIT (OUTPATIENT)
Dept: FAMILY MEDICINE | Facility: CLINIC | Age: 66
End: 2021-11-04
Payer: COMMERCIAL

## 2021-11-04 VITALS
BODY MASS INDEX: 26.57 KG/M2 | DIASTOLIC BLOOD PRESSURE: 60 MMHG | SYSTOLIC BLOOD PRESSURE: 130 MMHG | WEIGHT: 172.3 LBS | HEART RATE: 75 BPM | OXYGEN SATURATION: 98 %

## 2021-11-04 DIAGNOSIS — E11.65 TYPE 2 DIABETES MELLITUS WITH HYPERGLYCEMIA, WITHOUT LONG-TERM CURRENT USE OF INSULIN (H): Primary | ICD-10-CM

## 2021-11-04 DIAGNOSIS — Z79.899 MEDICATION MANAGEMENT: ICD-10-CM

## 2021-11-04 DIAGNOSIS — E78.5 DYSLIPIDEMIA: ICD-10-CM

## 2021-11-04 DIAGNOSIS — I10 ESSENTIAL HYPERTENSION: ICD-10-CM

## 2021-11-04 LAB
ALBUMIN SERPL-MCNC: 4.2 G/DL (ref 3.5–5)
ALP SERPL-CCNC: 57 U/L (ref 45–120)
ALT SERPL W P-5'-P-CCNC: 30 U/L (ref 0–45)
ANION GAP SERPL CALCULATED.3IONS-SCNC: 10 MMOL/L (ref 5–18)
AST SERPL W P-5'-P-CCNC: 19 U/L (ref 0–40)
BILIRUB SERPL-MCNC: 0.6 MG/DL (ref 0–1)
BUN SERPL-MCNC: 15 MG/DL (ref 8–22)
CALCIUM SERPL-MCNC: 10.1 MG/DL (ref 8.5–10.5)
CHLORIDE BLD-SCNC: 98 MMOL/L (ref 98–107)
CO2 SERPL-SCNC: 28 MMOL/L (ref 22–31)
CREAT SERPL-MCNC: 0.87 MG/DL (ref 0.6–1.3)
CREAT UR-MCNC: 100 MG/DL
GFR SERPL CREATININE-BSD FRML MDRD: >90 ML/MIN/1.73M2
GLUCOSE BLD-MCNC: 236 MG/DL (ref 70–125)
HBA1C MFR BLD: 8.6 % (ref 0–5.6)
LDLC SERPL CALC-MCNC: 109 MG/DL
MICROALBUMIN UR-MCNC: 7.08 MG/DL (ref 0–1.99)
MICROALBUMIN/CREAT UR: 70.8 MG/G CR
POTASSIUM BLD-SCNC: 4.5 MMOL/L (ref 3.5–5)
PROT SERPL-MCNC: 7.3 G/DL (ref 6–8)
SODIUM SERPL-SCNC: 136 MMOL/L (ref 136–145)

## 2021-11-04 PROCEDURE — 83721 ASSAY OF BLOOD LIPOPROTEIN: CPT | Performed by: NURSE PRACTITIONER

## 2021-11-04 PROCEDURE — 36415 COLL VENOUS BLD VENIPUNCTURE: CPT | Performed by: NURSE PRACTITIONER

## 2021-11-04 PROCEDURE — 90471 IMMUNIZATION ADMIN: CPT | Performed by: NURSE PRACTITIONER

## 2021-11-04 PROCEDURE — 80053 COMPREHEN METABOLIC PANEL: CPT | Performed by: NURSE PRACTITIONER

## 2021-11-04 PROCEDURE — 82043 UR ALBUMIN QUANTITATIVE: CPT | Performed by: NURSE PRACTITIONER

## 2021-11-04 PROCEDURE — 90682 RIV4 VACC RECOMBINANT DNA IM: CPT | Performed by: NURSE PRACTITIONER

## 2021-11-04 PROCEDURE — 83036 HEMOGLOBIN GLYCOSYLATED A1C: CPT | Performed by: NURSE PRACTITIONER

## 2021-11-04 PROCEDURE — 99214 OFFICE O/P EST MOD 30 MIN: CPT | Mod: 25 | Performed by: NURSE PRACTITIONER

## 2021-11-04 RX ORDER — LISINOPRIL 10 MG/1
10 TABLET ORAL DAILY
Qty: 90 TABLET | Refills: 3 | Status: SHIPPED | OUTPATIENT
Start: 2021-11-04 | End: 2022-12-18

## 2021-11-04 RX ORDER — PRAVASTATIN SODIUM 40 MG
TABLET ORAL
Qty: 90 TABLET | Refills: 3 | Status: SHIPPED | OUTPATIENT
Start: 2021-11-04 | End: 2021-11-08

## 2021-11-04 NOTE — PROGRESS NOTES
Assessment and Plan:     Type 2 diabetes mellitus with hyperglycemia, without long-term current use of insulin (H)  This has been uncontrolled.  Will check A1c.  He continues Xultophy 34 units daily and Metformin.  He is seeing nephrology for proteinuria.  - Hemoglobin A1c  - Albumin Random Urine Quantitative with Creat Ratio  - Hemoglobin A1c  - Albumin Random Urine Quantitative with Creat Ratio  - metFORMIN (GLUCOPHAGE) 1000 MG tablet  Dispense: 180 tablet; Refill: 1    Dyslipidemia  Goal LDL is less than 100.  He continues pravastatin.  - LDL cholesterol direct  - LDL cholesterol direct  - pravastatin (PRAVACHOL) 40 MG tablet  Dispense: 90 tablet; Refill: 3    Hypertension  Goal blood pressures less than 130/80.  He is borderline today.  I encouraged him to monitor his blood pressure at home.  If it remains borderline, will increase lisinopril to 20 mg daily.  He is content with the plan.  - lisinopril (ZESTRIL) 10 MG tablet  Dispense: 90 tablet; Refill: 3    Medication management  - Comprehensive metabolic panel (BMP + Alb, Alk Phos, ALT, AST, Total. Bili, TP)  - Comprehensive metabolic panel (BMP + Alb, Alk Phos, ALT, AST, Total. Bili, TP)    The patient is content with the plan and will follow-up in 3 months for medication management or sooner with any further concerns.  He declines Covid booster today.  He will see if his insurance covers the shingles vaccine.  I encouraged yearly eye exam.      Subjective:     Ced is a 65 year old adult presenting to the clinic for medication management.  Patient has type 2 diabetes, hypertension, hyperlipidemia, proteinuria.  Patient is currently injecting Xultophy 34 units daily and Metformin 1000 mg twice daily.  His blood sugars have ranged 130-150 fasting.  He is not using Victoza.  He is consuming a healthy diet and exercises regularly by walking 5 miles per day and using a rowing machine.  His last eye exam was in December.  He denies any sores on his feet.  He  has not been monitoring his blood pressure at home.  Blood pressure is borderline today.  He is taking lisinopril 10 mg daily.  Patient is taking pravastatin for lipid management.  He is not fasting today.    Reviewof Systems: A complete 14 point review of systems was obtained and is negative or as stated in the history of present illness.    Social History     Socioeconomic History     Marital status:      Spouse name: Not on file     Number of children: Not on file     Years of education: Not on file     Highest education level: Not on file   Occupational History     Not on file   Tobacco Use     Smoking status: Never Smoker     Smokeless tobacco: Never Used   Substance and Sexual Activity     Alcohol use: Yes     Comment: Alcoholic Drinks/day: Very little     Drug use: No     Sexual activity: Yes     Partners: Female     Comment:    Other Topics Concern     Not on file   Social History Narrative     Not on file     Social Determinants of Health     Financial Resource Strain:      Difficulty of Paying Living Expenses:    Food Insecurity:      Worried About Running Out of Food in the Last Year:      Ran Out of Food in the Last Year:    Transportation Needs:      Lack of Transportation (Medical):      Lack of Transportation (Non-Medical):    Physical Activity:      Days of Exercise per Week:      Minutes of Exercise per Session:    Stress:      Feeling of Stress :    Social Connections:      Frequency of Communication with Friends and Family:      Frequency of Social Gatherings with Friends and Family:      Attends Yarsanism Services:      Active Member of Clubs or Organizations:      Attends Club or Organization Meetings:      Marital Status:    Intimate Partner Violence:      Fear of Current or Ex-Partner:      Emotionally Abused:      Physically Abused:      Sexually Abused:        Active Ambulatory Problems     Diagnosis Date Noted     Microalbuminuria      Hypertension      Type 2 diabetes  mellitus with hyperglycemia, without long-term current use of insulin (H)      Dyslipidemia 08/26/2016     Resolved Ambulatory Problems     Diagnosis Date Noted     No Resolved Ambulatory Problems     Past Medical History:   Diagnosis Date     Diabetes mellitus (H)        Family History   Problem Relation Age of Onset     Breast Cancer Mother      Diabetes Father        Objective:     /60 (BP Location: Right arm, Patient Position: Sitting, Cuff Size: Adult Regular)   Pulse 75   Wt 78.2 kg (172 lb 4.8 oz)   SpO2 98%   BMI 26.57 kg/m      Patient is alert, in no obvious distress.   Skin: Warm, dry.    Lungs:  Clear to auscultation. Respirations even and unlabored.  No wheezing or rales noted.   Heart:  Regular rate and rhythm.  No murmurs, S3, S4, gallops, or rubs.    Abdomen: Soft, nontender.  No organomegaly. Bowel sounds normoactive. No guarding or masses noted.

## 2021-11-08 DIAGNOSIS — E78.5 DYSLIPIDEMIA: Primary | ICD-10-CM

## 2021-11-08 RX ORDER — PRAVASTATIN SODIUM 80 MG/1
80 TABLET ORAL DAILY
Qty: 90 TABLET | Refills: 0 | Status: SHIPPED | OUTPATIENT
Start: 2021-11-08 | End: 2022-01-31

## 2021-11-19 ENCOUNTER — TRANSFERRED RECORDS (OUTPATIENT)
Dept: HEALTH INFORMATION MANAGEMENT | Facility: CLINIC | Age: 66
End: 2021-11-19
Payer: COMMERCIAL

## 2021-11-19 LAB — RETINOPATHY: NEGATIVE

## 2021-11-23 ENCOUNTER — MYC MEDICAL ADVICE (OUTPATIENT)
Dept: PHARMACY | Facility: CLINIC | Age: 66
End: 2021-11-23
Payer: COMMERCIAL

## 2021-11-23 DIAGNOSIS — E11.65 TYPE 2 DIABETES MELLITUS WITH HYPERGLYCEMIA, WITH LONG-TERM CURRENT USE OF INSULIN (H): ICD-10-CM

## 2021-11-23 DIAGNOSIS — Z79.4 TYPE 2 DIABETES MELLITUS WITH HYPERGLYCEMIA, WITH LONG-TERM CURRENT USE OF INSULIN (H): ICD-10-CM

## 2021-11-24 RX ORDER — (INSULIN DEGLUDEC AND LIRAGLUTIDE) 100; 3.6 [IU]/ML; MG/ML
36 INJECTION, SOLUTION SUBCUTANEOUS DAILY
Qty: 15 ML | Refills: 2
Start: 2021-11-24 | End: 2022-01-14

## 2022-01-14 ENCOUNTER — MYC MEDICAL ADVICE (OUTPATIENT)
Dept: FAMILY MEDICINE | Facility: CLINIC | Age: 67
End: 2022-01-14

## 2022-01-14 DIAGNOSIS — Z79.4 TYPE 2 DIABETES MELLITUS WITH HYPERGLYCEMIA, WITH LONG-TERM CURRENT USE OF INSULIN (H): ICD-10-CM

## 2022-01-14 DIAGNOSIS — E11.65 TYPE 2 DIABETES MELLITUS WITH HYPERGLYCEMIA, WITHOUT LONG-TERM CURRENT USE OF INSULIN (H): ICD-10-CM

## 2022-01-14 DIAGNOSIS — E11.65 TYPE 2 DIABETES MELLITUS WITH HYPERGLYCEMIA, WITH LONG-TERM CURRENT USE OF INSULIN (H): ICD-10-CM

## 2022-01-14 RX ORDER — FLURBIPROFEN SODIUM 0.3 MG/ML
SOLUTION/ DROPS OPHTHALMIC
Qty: 100 EACH | Refills: 3 | Status: SHIPPED | OUTPATIENT
Start: 2022-01-14 | End: 2023-05-08

## 2022-01-14 RX ORDER — (INSULIN DEGLUDEC AND LIRAGLUTIDE) 100; 3.6 [IU]/ML; MG/ML
36 INJECTION, SOLUTION SUBCUTANEOUS DAILY
Qty: 15 ML | Refills: 2 | Status: SHIPPED | OUTPATIENT
Start: 2022-01-14 | End: 2022-02-28

## 2022-01-28 DIAGNOSIS — E78.5 DYSLIPIDEMIA: ICD-10-CM

## 2022-01-31 RX ORDER — PRAVASTATIN SODIUM 80 MG/1
TABLET ORAL
Qty: 90 TABLET | Refills: 3 | Status: SHIPPED | OUTPATIENT
Start: 2022-01-31 | End: 2023-01-18

## 2022-01-31 NOTE — TELEPHONE ENCOUNTER
"Last Written Prescription Date:  11/8/21  Last Fill Quantity: 90,  # refills: 0   Last office visit provider:  11/4/21     Requested Prescriptions   Pending Prescriptions Disp Refills     pravastatin (PRAVACHOL) 80 MG tablet [Pharmacy Med Name: PRAVASTATIN 80MG TABLETS] 90 tablet 0     Sig: TAKE 1 TABLET(80 MG) BY MOUTH DAILY       Statins Protocol Passed - 1/28/2022 12:40 PM        Passed - LDL on file in past 12 months     Recent Labs   Lab Test 11/04/21  0738                Passed - No abnormal creatine kinase in past 12 months     No lab results found.             Passed - Recent (12 mo) or future (30 days) visit within the authorizing provider's specialty     Patient has had an office visit with the authorizing provider or a provider within the authorizing providers department within the previous 12 mos or has a future within next 30 days. See \"Patient Info\" tab in inbasket, or \"Choose Columns\" in Meds & Orders section of the refill encounter.              Passed - Medication is active on med list        Passed - Patient is age 18 or older             Silvestre Pelayo RN 01/31/22 9:19 AM  "

## 2022-02-01 DIAGNOSIS — E11.65 TYPE 2 DIABETES MELLITUS WITH HYPERGLYCEMIA, WITHOUT LONG-TERM CURRENT USE OF INSULIN (H): ICD-10-CM

## 2022-02-01 DIAGNOSIS — E78.5 DYSLIPIDEMIA: ICD-10-CM

## 2022-02-03 RX ORDER — PRAVASTATIN SODIUM 80 MG/1
TABLET ORAL
Qty: 90 TABLET | Refills: 3 | OUTPATIENT
Start: 2022-02-03

## 2022-02-04 ENCOUNTER — MYC MEDICAL ADVICE (OUTPATIENT)
Dept: PHARMACY | Facility: CLINIC | Age: 67
End: 2022-02-04

## 2022-02-04 DIAGNOSIS — E11.65 TYPE 2 DIABETES MELLITUS WITH HYPERGLYCEMIA, WITHOUT LONG-TERM CURRENT USE OF INSULIN (H): ICD-10-CM

## 2022-02-28 ENCOUNTER — OFFICE VISIT (OUTPATIENT)
Dept: FAMILY MEDICINE | Facility: CLINIC | Age: 67
End: 2022-02-28
Payer: COMMERCIAL

## 2022-02-28 VITALS
DIASTOLIC BLOOD PRESSURE: 62 MMHG | SYSTOLIC BLOOD PRESSURE: 132 MMHG | BODY MASS INDEX: 26.22 KG/M2 | WEIGHT: 170 LBS | HEART RATE: 80 BPM

## 2022-02-28 DIAGNOSIS — E78.5 DYSLIPIDEMIA: ICD-10-CM

## 2022-02-28 DIAGNOSIS — I10 ESSENTIAL HYPERTENSION: ICD-10-CM

## 2022-02-28 DIAGNOSIS — E11.65 TYPE 2 DIABETES MELLITUS WITH HYPERGLYCEMIA, WITHOUT LONG-TERM CURRENT USE OF INSULIN (H): Primary | ICD-10-CM

## 2022-02-28 LAB
CHOLEST SERPL-MCNC: 143 MG/DL
FASTING STATUS PATIENT QL REPORTED: YES
HBA1C MFR BLD: 8.8 % (ref 0–5.6)
HDLC SERPL-MCNC: 50 MG/DL
LDLC SERPL CALC-MCNC: 81 MG/DL
TRIGL SERPL-MCNC: 58 MG/DL

## 2022-02-28 PROCEDURE — 99214 OFFICE O/P EST MOD 30 MIN: CPT | Performed by: NURSE PRACTITIONER

## 2022-02-28 PROCEDURE — 80061 LIPID PANEL: CPT | Performed by: NURSE PRACTITIONER

## 2022-02-28 PROCEDURE — 83036 HEMOGLOBIN GLYCOSYLATED A1C: CPT | Performed by: NURSE PRACTITIONER

## 2022-02-28 PROCEDURE — 36415 COLL VENOUS BLD VENIPUNCTURE: CPT | Performed by: NURSE PRACTITIONER

## 2022-02-28 RX ORDER — (INSULIN DEGLUDEC AND LIRAGLUTIDE) 100; 3.6 [IU]/ML; MG/ML
36 INJECTION, SOLUTION SUBCUTANEOUS DAILY
Qty: 15 ML | Refills: 2 | Status: SHIPPED | OUTPATIENT
Start: 2022-02-28 | End: 2022-06-21

## 2022-02-28 NOTE — PROGRESS NOTES
Assessment and Plan:     Type 2 diabetes mellitus with hyperglycemia, without long-term current use of insulin (H)  Continue Xultophy and metformin as prescribed.  He is retiring and is concerned of the cost of his Xultophy.  We will check A1c today.  Will refer to endocrinology as he continues to experience random spikes of blood sugars throughout the day.  - Hemoglobin A1c  - Adult Endocrinology [facility] Referral  - Hemoglobin A1c  - insulin degludec-liraglutide (XULTOPHY) pen  Dispense: 15 mL; Refill: 2    Dyslipidemia  Goal LDL is less than 100.  He continues pravastatin.  - Lipid panel reflex to direct LDL Fasting  - Lipid panel reflex to direct LDL Fasting    Hypertension  This is controlled by lisinopril.        Subjective:     Ced is a 66 year old adult presenting to the clinic for a diabetes check.  Patient has type 2 diabetes, hypertension, hyperlipidemia, proteinuria.  He is injecting Xultophy 36 units daily and Metformin 1000 mg twice daily.  He is concerned as he has seen spikes in his blood sugar intermittently throughout the day.  These blood sugar increases are around 220-260.  He has noticed that this occurs after going out to eat.  His wife purchased an air fryer which has been controlling his blood sugars more.  He is consuming a healthy diet and walks 5 miles per day for exercise.  He does admit to added stress at work.  At the end of March, he is retiring.  His last eye exam was in December.  He denies any sores on his feet.  He is taking lisinopril for hypertension.  He takes pravastatin for lipid management.  He is fasting today.    Reviewof Systems: A complete 14 point review of systems was obtained and is negative or as stated in the history of present illness.    Social History     Socioeconomic History     Marital status:      Spouse name: Not on file     Number of children: Not on file     Years of education: Not on file     Highest education level: Not on file   Occupational  History     Not on file   Tobacco Use     Smoking status: Never Smoker     Smokeless tobacco: Never Used   Substance and Sexual Activity     Alcohol use: Yes     Comment: Alcoholic Drinks/day: Very little     Drug use: No     Sexual activity: Yes     Partners: Female     Comment:    Other Topics Concern     Not on file   Social History Narrative     Not on file     Social Determinants of Health     Financial Resource Strain: Not on file   Food Insecurity: Not on file   Transportation Needs: Not on file   Physical Activity: Not on file   Stress: Not on file   Social Connections: Not on file   Intimate Partner Violence: Not on file   Housing Stability: Not on file       Active Ambulatory Problems     Diagnosis Date Noted     Microalbuminuria      Hypertension      Type 2 diabetes mellitus with hyperglycemia, without long-term current use of insulin (H)      Dyslipidemia 08/26/2016     Resolved Ambulatory Problems     Diagnosis Date Noted     No Resolved Ambulatory Problems     Past Medical History:   Diagnosis Date     Diabetes mellitus (H)        Family History   Problem Relation Age of Onset     Breast Cancer Mother      Diabetes Father        Objective:     /62 (BP Location: Right arm, Patient Position: Sitting, Cuff Size: Adult Regular)   Pulse 80   Wt 77.1 kg (170 lb)   BMI 26.22 kg/m      Patient is alert, in no obvious distress.   Skin: Warm, dry.    Lungs:  Clear to auscultation. Respirations even and unlabored.  No wheezing or rales noted.   Heart:  Regular rate and rhythm.  No murmurs, S3, S4, gallops, or rubs.    Abdomen: Soft, nontender.  No organomegaly. Bowel sounds normoactive. No guarding or masses noted.   Musculoskeletal:  No edema is present in bilateral lower extremities.

## 2022-04-04 ENCOUNTER — MYC MEDICAL ADVICE (OUTPATIENT)
Dept: ENDOCRINOLOGY | Facility: CLINIC | Age: 67
End: 2022-04-04

## 2022-04-04 ENCOUNTER — VIRTUAL VISIT (OUTPATIENT)
Dept: ENDOCRINOLOGY | Facility: CLINIC | Age: 67
End: 2022-04-04
Attending: NURSE PRACTITIONER
Payer: COMMERCIAL

## 2022-04-04 DIAGNOSIS — Z79.4 TYPE 2 DIABETES MELLITUS WITH MICROALBUMINURIA, WITH LONG-TERM CURRENT USE OF INSULIN (H): ICD-10-CM

## 2022-04-04 DIAGNOSIS — E78.5 DYSLIPIDEMIA: ICD-10-CM

## 2022-04-04 DIAGNOSIS — E11.29 TYPE 2 DIABETES MELLITUS WITH MICROALBUMINURIA, WITH LONG-TERM CURRENT USE OF INSULIN (H): ICD-10-CM

## 2022-04-04 DIAGNOSIS — K76.0 HEPATIC STEATOSIS: ICD-10-CM

## 2022-04-04 DIAGNOSIS — I10 HYPERTENSION, UNSPECIFIED TYPE: ICD-10-CM

## 2022-04-04 DIAGNOSIS — E11.65 TYPE 2 DIABETES MELLITUS WITH HYPERGLYCEMIA, WITH LONG-TERM CURRENT USE OF INSULIN (H): ICD-10-CM

## 2022-04-04 DIAGNOSIS — E88.819 INSULIN RESISTANCE: ICD-10-CM

## 2022-04-04 DIAGNOSIS — Z79.4 TYPE 2 DIABETES MELLITUS WITH HYPERGLYCEMIA, WITH LONG-TERM CURRENT USE OF INSULIN (H): ICD-10-CM

## 2022-04-04 DIAGNOSIS — Z79.4 LONG TERM (CURRENT) USE OF INSULIN (H): ICD-10-CM

## 2022-04-04 DIAGNOSIS — E66.3 OVERWEIGHT (BMI 25.0-29.9): ICD-10-CM

## 2022-04-04 DIAGNOSIS — R80.9 TYPE 2 DIABETES MELLITUS WITH MICROALBUMINURIA, WITH LONG-TERM CURRENT USE OF INSULIN (H): ICD-10-CM

## 2022-04-04 PROCEDURE — 99205 OFFICE O/P NEW HI 60 MIN: CPT | Mod: 95 | Performed by: INTERNAL MEDICINE

## 2022-04-04 RX ORDER — DULAGLUTIDE 1.5 MG/.5ML
1.5 INJECTION, SOLUTION SUBCUTANEOUS
Qty: 6 ML | Refills: 4 | Status: SHIPPED | OUTPATIENT
Start: 2022-04-04 | End: 2022-06-21

## 2022-04-04 RX ORDER — GLUCAGON 3 MG/1
3 POWDER NASAL DAILY PRN
Qty: 2 EACH | Refills: 3 | Status: SHIPPED | OUTPATIENT
Start: 2022-04-04 | End: 2023-05-26

## 2022-04-04 RX ORDER — INSULIN DEGLUDEC 100 U/ML
45 INJECTION, SOLUTION SUBCUTANEOUS DAILY
Qty: 30 ML | Refills: 4 | Status: SHIPPED | OUTPATIENT
Start: 2022-04-04 | End: 2022-06-21

## 2022-04-04 NOTE — LETTER
"    4/4/2022         RE: Ced Fajardo  [id number] [address] [place] [id number]        Dear Colleague,    Thank you for referring your patient, Ced Fajardo, to the [facility]. Please see a copy of my visit note below.    Video visit    Start time 8:03, stop time 8:48; additional 17 minutes spent on the date of the encounter doing chart review, documentation and further activities as noted.     Provider location: [facility], [address] [place] [id number]    Patient location: patient home    Mode of transmission: video     Subjective:    New patient; saw Mary Jane Elizalde >3 years ago     Ced Fajardo is a 66 year old adult who presents to review DM.    Diagnosed with DM: diagnosed around 2009 when he presented with polyuria and polydipsia when his BMI was around 29 kg/m2; current BMI ~26.6 kg/m2. He was first started on insulin about 10 years after diagnosis of DM.      History of DKA/HHS: no    FH of DM: father with DM-2 diagnosed around age 50     Glycemic control over the years:         Current DM therapy:  -Xultophy (insulin degludec - liraglutide) 36 \"units\" given in the AM   -Metformin 1000 mg IR BID         Prior DM therapy:  -no SGLT2 inhibitor     Current glycemic control:  -glucometer 90 day average 187 mg/dL  -checks fasting in the AM generally around 200 mg/dL   -diagnostic CGM a few years ago, otherwise no personal CGM   -no hypoglycemia     Diet: 3 meals/day, doesn't drink calories     Physical activity: walks 5 miles/day     Tobacco/alcohol use: no    Complications noted in the A/P section.     No personal/FH: pancreatitis, pancreatic cancer, thyroid cancer, MEN     No urinary tract infections.      at , recently retired.     Objective:    Video visit.    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 No underlying masses. No bile duct dilatation. No pancreatic duct dilatation.  # No diabetic retinopathy, most recent DM eye exam 11/2021  # Hypertension, microalbuminuria, on lisinopril   -11/2021: GFR >90, urine microalbumin 70.8 mg/g Cr  # No definite peripheral neuropathy, no prior DM foot ulceration  # No prior ASCVD event  # Dyslipidemia, on pravastatin 80 mg daily and ASA  -2/2022: , HDL 50, LDL 81, TG 58  # Hepatic steatosis  # Overweight     We reviewed our overall treatment approach will be maximizing non-insulin therapies: metformin, GLP-1 R agonist, SGLT2 inhibitor and using insulin at the lowest dose necessary.    For now increase Xultophy (insulin degludec - liraglutide) to 42 \"units\" given in the AM (this contains 42 units of degludec and 1.51 mg of liraglutide). Continue metformin 1000 mg BID. Start empagliflozin 10 mg daily, BMP 7-10 days after starting, then increase to 25 mg daily. We reviewed potential adverse effects of GLP- R agonists and SGLT2 inhibitors in detail. Reviewed that using a GLP-1 R agonist should be ok even in the setting of his pancreatic abnormality.     I prescribed insulin degludec and Trulicity and when he runs out of his Xultophy pens we will make the switch.     Check a capillary glucose fasting in the AM then a second time during the day (before meals, at bedtime).    Reviewed goal fasting and pre-prandial glucose <130 mg/dL, goal HbA1c <7.0%. Reviewed hypoglycemia management and I prescribed intranasal glucagon.     Return visit in 2-3 months. He will keep me updated via My Chart in the interim. Will up titrate lisinopril down the road as well given his microalbuminuria.       Again, thank you for allowing me to participate in the care of your patient.        Sincerely,        Nasir" LITA Young MD

## 2022-04-04 NOTE — PROGRESS NOTES
"Video visit    Start time 8:03, stop time 8:48; additional 17 minutes spent on the date of the encounter doing chart review, documentation and further activities as noted.     Provider location: [facility], [address] [place] [address] [address], [place] [address]    Patient location: patient home    Mode of transmission: video     Subjective:    New patient; saw Mary Jane Elizalde >3 years ago     Ced Fajardo is a 66 year old adult who presents to review DM.    Diagnosed with DM: diagnosed around 2009 when he presented with polyuria and polydipsia when his BMI was around 29 kg/m2; current BMI ~26.6 kg/m2. He was first started on insulin about 10 years after diagnosis of DM.      History of DKA/HHS: no    FH of DM: father with DM-2 diagnosed around age 50     Glycemic control over the years:         Current DM therapy:  -Xultophy (insulin degludec - liraglutide) 36 \"units\" given in the AM   -Metformin 1000 mg IR BID         Prior DM therapy:  -no SGLT2 inhibitor     Current glycemic control:  -glucometer 90 day average 187 mg/dL  -checks fasting in the AM generally around 200 mg/dL   -diagnostic CGM a few years ago, otherwise no personal CGM   -no hypoglycemia     Diet: 3 meals/day, doesn't drink calories     Physical activity: walks 5 miles/day     Tobacco/alcohol use: no    Complications noted in the A/P section.     No personal/FH: pancreatitis, pancreatic cancer, thyroid cancer, MEN     No urinary tract infections.      at , recently retired.     Objective:    Video visit.    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 No underlying masses. No bile duct dilatation. No pancreatic duct dilatation.  # " "No diabetic retinopathy, most recent DM eye exam 11/2021  # Hypertension, microalbuminuria, on lisinopril   -11/2021: GFR >90, urine microalbumin 70.8 mg/g Cr  # No definite peripheral neuropathy, no prior DM foot ulceration  # No prior ASCVD event  # Dyslipidemia, on pravastatin 80 mg daily and ASA  -2/2022: , HDL 50, LDL 81, TG 58  # Hepatic steatosis  # Overweight     We reviewed our overall treatment approach will be maximizing non-insulin therapies: metformin, GLP-1 R agonist, SGLT2 inhibitor and using insulin at the lowest dose necessary.    For now increase Xultophy (insulin degludec - liraglutide) to 42 \"units\" given in the AM (this contains 42 units of degludec and 1.51 mg of liraglutide). Continue metformin 1000 mg BID. Start empagliflozin 10 mg daily, BMP 7-10 days after starting, then increase to 25 mg daily. We reviewed potential adverse effects of GLP- R agonists and SGLT2 inhibitors in detail. Reviewed that using a GLP-1 R agonist should be ok even in the setting of his pancreatic abnormality.     I prescribed insulin degludec and Trulicity and when he runs out of his Xultophy pens we will make the switch.     Check a capillary glucose fasting in the AM then a second time during the day (before meals, at bedtime).    Reviewed goal fasting and pre-prandial glucose <130 mg/dL, goal HbA1c <7.0%. Reviewed hypoglycemia management and I prescribed intranasal glucagon.     Return visit in 2-3 months. He will keep me updated via My Chart in the interim. Will up titrate lisinopril down the road as well given his microalbuminuria.   "

## 2022-04-05 ENCOUNTER — TELEPHONE (OUTPATIENT)
Dept: ENDOCRINOLOGY | Facility: CLINIC | Age: 67
End: 2022-04-05
Payer: COMMERCIAL

## 2022-04-07 NOTE — TELEPHONE ENCOUNTER
Central Prior Authorization Team   Phone: [phone]      Prior Authorization Approval    Authorization Effective Date: 4/7/2022  Authorization Expiration Date: 4/7/2023  Medication: TRULICITY--APPROVED  Approved Dose/Quantity:    Reference #:     Insurance Company: [employer] [place] - Phone [phone] Fax [phone]  Expected CoPay:       CoPay Card Available:      Foundation Assistance Needed:    Which Pharmacy is filling the prescription (Not needed for infusion/clinic administered): [id number] [address] AT [place] [address]  Pharmacy Notified: Yes  Patient Notified: Yes PHARMACY WILL CONTACT WHEN FILLED

## 2022-04-07 NOTE — TELEPHONE ENCOUNTER
Central Prior Authorization Team   Phone: [phone]      PA Initiation    Medication: TRULICITY--INITIATED  Insurance Company: [facility] - Phone [phone] Fax [phone]  Pharmacy [facility]illing the Rx: [id number] [place] [address] AT [place] [address]  Filling Pharmacy Phone: [phone]  Filling Pharmacy Fax:    Start Date: 4/7/2022

## 2022-04-13 ENCOUNTER — LAB (OUTPATIENT)
Dept: LAB | Facility: CLINIC | Age: 67
End: 2022-04-13
Payer: COMMERCIAL

## 2022-04-13 DIAGNOSIS — E11.65 TYPE 2 DIABETES MELLITUS WITH HYPERGLYCEMIA, WITH LONG-TERM CURRENT USE OF INSULIN (H): ICD-10-CM

## 2022-04-13 DIAGNOSIS — Z79.4 TYPE 2 DIABETES MELLITUS WITH HYPERGLYCEMIA, WITH LONG-TERM CURRENT USE OF INSULIN (H): ICD-10-CM

## 2022-04-13 LAB
ANION GAP SERPL CALCULATED.3IONS-SCNC: 14 MMOL/L (ref 5–18)
BUN SERPL-MCNC: 21 MG/DL (ref 8–22)
CALCIUM SERPL-MCNC: 10.5 MG/DL (ref 8.5–10.5)
CHLORIDE BLD-SCNC: 100 MMOL/L (ref 98–107)
CO2 SERPL-SCNC: 24 MMOL/L (ref 22–31)
CREAT SERPL-MCNC: 1.07 MG/DL (ref 0.6–1.3)
GFR SERPL CREATININE-BSD FRML MDRD: 77 ML/MIN/1.73M2
GLUCOSE BLD-MCNC: 164 MG/DL (ref 70–125)
POTASSIUM BLD-SCNC: 4.8 MMOL/L (ref 3.5–5)
SODIUM SERPL-SCNC: 138 MMOL/L (ref 136–145)

## 2022-04-13 PROCEDURE — 80048 BASIC METABOLIC PNL TOTAL CA: CPT

## 2022-04-13 PROCEDURE — 36415 COLL VENOUS BLD VENIPUNCTURE: CPT

## 2022-06-21 ENCOUNTER — LAB (OUTPATIENT)
Dept: LAB | Facility: CLINIC | Age: 67
End: 2022-06-21

## 2022-06-21 ENCOUNTER — OFFICE VISIT (OUTPATIENT)
Dept: ENDOCRINOLOGY | Facility: CLINIC | Age: 67
End: 2022-06-21
Payer: COMMERCIAL

## 2022-06-21 VITALS
DIASTOLIC BLOOD PRESSURE: 70 MMHG | BODY MASS INDEX: 25.29 KG/M2 | WEIGHT: 164 LBS | SYSTOLIC BLOOD PRESSURE: 132 MMHG | HEART RATE: 80 BPM

## 2022-06-21 DIAGNOSIS — Z79.4 TYPE 2 DIABETES MELLITUS WITH HYPERGLYCEMIA, WITH LONG-TERM CURRENT USE OF INSULIN (H): Primary | ICD-10-CM

## 2022-06-21 DIAGNOSIS — R80.9 TYPE 2 DIABETES MELLITUS WITH MICROALBUMINURIA, WITH LONG-TERM CURRENT USE OF INSULIN (H): ICD-10-CM

## 2022-06-21 DIAGNOSIS — E78.5 DYSLIPIDEMIA: ICD-10-CM

## 2022-06-21 DIAGNOSIS — Z79.4 TYPE 2 DIABETES MELLITUS WITH HYPERGLYCEMIA, WITH LONG-TERM CURRENT USE OF INSULIN (H): ICD-10-CM

## 2022-06-21 DIAGNOSIS — I10 HYPERTENSION, UNSPECIFIED TYPE: ICD-10-CM

## 2022-06-21 DIAGNOSIS — Z79.4 LONG TERM (CURRENT) USE OF INSULIN (H): ICD-10-CM

## 2022-06-21 DIAGNOSIS — K76.0 HEPATIC STEATOSIS: ICD-10-CM

## 2022-06-21 DIAGNOSIS — E88.819 INSULIN RESISTANCE: ICD-10-CM

## 2022-06-21 DIAGNOSIS — E11.65 TYPE 2 DIABETES MELLITUS WITH HYPERGLYCEMIA, WITH LONG-TERM CURRENT USE OF INSULIN (H): Primary | ICD-10-CM

## 2022-06-21 DIAGNOSIS — E11.65 TYPE 2 DIABETES MELLITUS WITH HYPERGLYCEMIA, WITHOUT LONG-TERM CURRENT USE OF INSULIN (H): ICD-10-CM

## 2022-06-21 DIAGNOSIS — E11.65 TYPE 2 DIABETES MELLITUS WITH HYPERGLYCEMIA, WITH LONG-TERM CURRENT USE OF INSULIN (H): ICD-10-CM

## 2022-06-21 DIAGNOSIS — Z79.4 TYPE 2 DIABETES MELLITUS WITH MICROALBUMINURIA, WITH LONG-TERM CURRENT USE OF INSULIN (H): ICD-10-CM

## 2022-06-21 DIAGNOSIS — E11.29 TYPE 2 DIABETES MELLITUS WITH MICROALBUMINURIA, WITH LONG-TERM CURRENT USE OF INSULIN (H): ICD-10-CM

## 2022-06-21 LAB
FASTING STATUS PATIENT QL REPORTED: ABNORMAL
GLUCOSE BLD-MCNC: 154 MG/DL (ref 70–125)
HBA1C MFR BLD: 7.2 % (ref 0–5.6)

## 2022-06-21 PROCEDURE — 36415 COLL VENOUS BLD VENIPUNCTURE: CPT

## 2022-06-21 PROCEDURE — 99214 OFFICE O/P EST MOD 30 MIN: CPT | Performed by: INTERNAL MEDICINE

## 2022-06-21 PROCEDURE — 84681 ASSAY OF C-PEPTIDE: CPT

## 2022-06-21 PROCEDURE — 83036 HEMOGLOBIN GLYCOSYLATED A1C: CPT

## 2022-06-21 PROCEDURE — 82947 ASSAY GLUCOSE BLOOD QUANT: CPT

## 2022-06-21 RX ORDER — DULAGLUTIDE 1.5 MG/.5ML
1.5 INJECTION, SOLUTION SUBCUTANEOUS
Qty: 6 ML | Refills: 4 | Status: SHIPPED | OUTPATIENT
Start: 2022-06-21 | End: 2023-03-30

## 2022-06-21 RX ORDER — INSULIN DEGLUDEC 100 U/ML
45 INJECTION, SOLUTION SUBCUTANEOUS DAILY
Qty: 50 ML | Refills: 4 | Status: SHIPPED | OUTPATIENT
Start: 2022-06-21

## 2022-06-21 NOTE — LETTER
6/21/2022         RE: Ced Fajardo  [address] [address] [place] [id number]        Dear Colleague,    Thank you for referring your patient, Ced Fajardo, to the [facility]. Please see a copy of my visit note below.    Subjective:    Established patient    Ced Fajardo is a 66 year old adult who presents to review DM.    Current DM therapy:  -Tresiba 45-0-0-0  -Trulicity 1.5 mg weekly  -Metformin 1000 mg BID  -Empagliflozin started 4/4/2022; BMP normal 4/13/2022, now on Jardiance 25 mg daily    He uses a glucometer (did not bring it with him today).    Fasting AM: <130 mg/dL; doesn't check at other times of the day      No hypoglycemia.     Objective:    BMI 25.1 kg/m2, /70    Appears well. Thyroid examined and normal. No cervical LAD. No lipohypertrophy/lipodystrophy at his abdominal insulin injection sites. DM foot exam performed and normal.     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 No underlying masses. No bile duct dilatation. No pancreatic duct dilatation.  # No diabetic retinopathy, most recent DM eye exam 11/2021  # Hypertension, microalbuminuria, on lisinopril   -11/2021: urine microalbumin 70.8 mg/g Cr  -4/2022: GFR 77  # No definite peripheral neuropathy, no prior DM foot ulceration  # No prior ASCVD event  # Dyslipidemia, on pravastatin 80 mg daily and ASA  -2/2022: , HDL 50, LDL 81, TG 58  # Hepatic steatosis  -He has intranasal glucagon at home    Doing well. Today will check: A1c, glucose, C-peptide. He will continue checking a capillary glucose once daily but vary the time of day checked. If labs today are satisfactory will have him return in 6 months.       32 minutes spent on the date  of the encounter doing chart review, history and exam, documentation and further activities as noted above.            Again, thank you for allowing me to participate in the care of your patient.        Sincerely,        Nasir Young MD

## 2022-06-21 NOTE — TELEPHONE ENCOUNTER
"Last Written Prescription Date:  2/5/22  Last Fill Quantity: 180,  # refills: 0   Last office visit provider:  2/28/22     Requested Prescriptions   Pending Prescriptions Disp Refills     metFORMIN (GLUCOPHAGE) 1000 MG tablet [Pharmacy Med Name: METFORMIN 1000MG TABLETS] 180 tablet 0     Sig: TAKE 1 TABLET(1000 MG) BY MOUTH EVERY 12 HOURS       Biguanide Agents Passed - 6/21/2022  2:44 PM        Passed - Patient is age 10 or older        Passed - Patient has documented A1c within the specified period of time.     If HgbA1C is 8 or greater, it needs to be on file within the past 3 months.  If less than 8, must be on file within the past 6 months.     Recent Labs   Lab Test 06/21/22  1141   A1C 7.2*             Passed - Patient's CR is NOT>1.4 OR Patient's EGFR is NOT<45 within past 12 mos.     Recent Labs   Lab Test 04/13/22  0934 11/04/21  0738 10/30/20  0834   GFRESTIMATED 77   < > >60   GFRESTBLACK  --   --  >60    < > = values in this interval not displayed.       Recent Labs   Lab Test 04/13/22  0934   CR 1.07             Passed - Patient does NOT have a diagnosis of CHF.        Passed - Medication is active on med list        Passed - Recent (6 mo) or future (30 days) visit within the authorizing provider's specialty     Patient had office visit in the last 6 months or has a visit in the next 30 days with authorizing provider or within the authorizing provider's specialty.  See \"Patient Info\" tab in inbasket, or \"Choose Columns\" in Meds & Orders section of the refill encounter.                 Silvestre Pelayo RN 06/21/22 2:44 PM  "

## 2022-06-21 NOTE — PROGRESS NOTES
Subjective:    Established patient    Ced Fajardo is a 66 year old adult who presents to review DM.    Current DM therapy:  -Tresiba 45-0-0-0  -Trulicity 1.5 mg weekly  -Metformin 1000 mg BID  -Empagliflozin started 4/4/2022; BMP normal 4/13/2022, now on Jardiance 25 mg daily    He uses a glucometer (did not bring it with him today).    Fasting AM: <130 mg/dL; doesn't check at other times of the day      No hypoglycemia.     Objective:    BMI 25.1 kg/m2, /70    Appears well. Thyroid examined and normal. No cervical LAD. No lipohypertrophy/lipodystrophy at his abdominal insulin injection sites. DM foot exam performed and normal.     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 No underlying masses. No bile duct dilatation. No pancreatic duct dilatation.  # No diabetic retinopathy, most recent DM eye exam 11/2021  # Hypertension, microalbuminuria, on lisinopril   -11/2021: urine microalbumin 70.8 mg/g Cr  -4/2022: GFR 77  # No definite peripheral neuropathy, no prior DM foot ulceration  # No prior ASCVD event  # Dyslipidemia, on pravastatin 80 mg daily and ASA  -2/2022: , HDL 50, LDL 81, TG 58  # Hepatic steatosis  -He has intranasal glucagon at home    Doing well. Today will check: A1c, glucose, C-peptide. He will continue checking a capillary glucose once daily but vary the time of day checked. If labs today are satisfactory will have him return in 6 months.       32 minutes spent on the date of the encounter doing chart review, history and exam, documentation and further activities as noted above.

## 2022-06-22 LAB — C PEPTIDE SERPL-MCNC: 1.8 NG/ML (ref 0.9–6.9)

## 2022-07-06 DIAGNOSIS — E11.65 TYPE 2 DIABETES MELLITUS WITH HYPERGLYCEMIA, WITH LONG-TERM CURRENT USE OF INSULIN (H): Primary | ICD-10-CM

## 2022-07-06 DIAGNOSIS — Z79.4 TYPE 2 DIABETES MELLITUS WITH HYPERGLYCEMIA, WITH LONG-TERM CURRENT USE OF INSULIN (H): Primary | ICD-10-CM

## 2022-07-11 ENCOUNTER — DOCUMENTATION ONLY (OUTPATIENT)
Dept: ENDOCRINOLOGY | Facility: CLINIC | Age: 67
End: 2022-07-11

## 2022-07-16 ENCOUNTER — HEALTH MAINTENANCE LETTER (OUTPATIENT)
Age: 67
End: 2022-07-16

## 2022-09-18 ENCOUNTER — HEALTH MAINTENANCE LETTER (OUTPATIENT)
Age: 67
End: 2022-09-18

## 2022-10-28 ENCOUNTER — LAB (OUTPATIENT)
Dept: LAB | Facility: CLINIC | Age: 67
End: 2022-10-28
Payer: COMMERCIAL

## 2022-10-28 DIAGNOSIS — E11.65 TYPE 2 DIABETES MELLITUS WITH HYPERGLYCEMIA, WITH LONG-TERM CURRENT USE OF INSULIN (H): ICD-10-CM

## 2022-10-28 DIAGNOSIS — Z79.4 TYPE 2 DIABETES MELLITUS WITH HYPERGLYCEMIA, WITH LONG-TERM CURRENT USE OF INSULIN (H): ICD-10-CM

## 2022-10-28 LAB
CHOLEST SERPL-MCNC: 146 MG/DL
CREAT SERPL-MCNC: 0.88 MG/DL (ref 0.51–1.17)
FASTING STATUS PATIENT QL REPORTED: YES
GFR SERPL CREATININE-BSD FRML MDRD: >90 ML/MIN/1.73M2
GLUCOSE SERPL-MCNC: 101 MG/DL (ref 70–99)
HBA1C MFR BLD: 7.5 % (ref 0–5.6)
HDLC SERPL-MCNC: 55 MG/DL
LDLC SERPL CALC-MCNC: 75 MG/DL
NONHDLC SERPL-MCNC: 91 MG/DL
TRIGL SERPL-MCNC: 79 MG/DL

## 2022-10-28 PROCEDURE — 80061 LIPID PANEL: CPT

## 2022-10-28 PROCEDURE — 83036 HEMOGLOBIN GLYCOSYLATED A1C: CPT

## 2022-10-28 PROCEDURE — 82043 UR ALBUMIN QUANTITATIVE: CPT

## 2022-10-28 PROCEDURE — 82947 ASSAY GLUCOSE BLOOD QUANT: CPT

## 2022-10-28 PROCEDURE — 36415 COLL VENOUS BLD VENIPUNCTURE: CPT

## 2022-10-28 PROCEDURE — 82565 ASSAY OF CREATININE: CPT

## 2022-10-30 LAB
CREAT UR-MCNC: 71.8 MG/DL
MICROALBUMIN UR-MCNC: <12 MG/L
MICROALBUMIN/CREAT UR: NORMAL MG/G{CREAT}

## 2022-11-01 ENCOUNTER — OFFICE VISIT (OUTPATIENT)
Dept: ENDOCRINOLOGY | Facility: CLINIC | Age: 67
End: 2022-11-01
Payer: COMMERCIAL

## 2022-11-01 VITALS
WEIGHT: 164 LBS | HEART RATE: 81 BPM | BODY MASS INDEX: 25.29 KG/M2 | OXYGEN SATURATION: 96 % | SYSTOLIC BLOOD PRESSURE: 138 MMHG | DIASTOLIC BLOOD PRESSURE: 60 MMHG

## 2022-11-01 DIAGNOSIS — Z79.4 TYPE 2 DIABETES MELLITUS WITH HYPERGLYCEMIA, WITH LONG-TERM CURRENT USE OF INSULIN (H): Primary | ICD-10-CM

## 2022-11-01 DIAGNOSIS — E78.5 DYSLIPIDEMIA: ICD-10-CM

## 2022-11-01 DIAGNOSIS — E11.65 TYPE 2 DIABETES MELLITUS WITH HYPERGLYCEMIA, WITH LONG-TERM CURRENT USE OF INSULIN (H): Primary | ICD-10-CM

## 2022-11-01 DIAGNOSIS — K76.0 HEPATIC STEATOSIS: ICD-10-CM

## 2022-11-01 DIAGNOSIS — E66.3 OVERWEIGHT (BMI 25.0-29.9): ICD-10-CM

## 2022-11-01 DIAGNOSIS — I10 HYPERTENSION, UNSPECIFIED TYPE: ICD-10-CM

## 2022-11-01 DIAGNOSIS — Z79.4 LONG TERM (CURRENT) USE OF INSULIN (H): ICD-10-CM

## 2022-11-01 DIAGNOSIS — E88.819 INSULIN RESISTANCE: ICD-10-CM

## 2022-11-01 PROCEDURE — 90471 IMMUNIZATION ADMIN: CPT | Performed by: INTERNAL MEDICINE

## 2022-11-01 PROCEDURE — 99213 OFFICE O/P EST LOW 20 MIN: CPT | Performed by: INTERNAL MEDICINE

## 2022-11-01 PROCEDURE — 90686 IIV4 VACC NO PRSV 0.5 ML IM: CPT | Performed by: INTERNAL MEDICINE

## 2022-11-01 RX ORDER — DULAGLUTIDE 3 MG/.5ML
3 INJECTION, SOLUTION SUBCUTANEOUS
Qty: 6 ML | Refills: 4 | Status: SHIPPED | OUTPATIENT
Start: 2022-11-01 | End: 2023-06-22

## 2022-11-01 NOTE — LETTER
11/1/2022         RE: Ced Fajardo  [id number] [address] [place] [id number]        Dear Colleague,    Thank you for referring your patient, Ced Fajardo, to the [facility]. Please see a copy of my visit note below.    Subjective:    Established patient    Ced Fajardo is a 66 year old adult who presents to review DM.     Current DM therapy:  -Tresiba 45-0-0-0  -Trulicity 1.5 mg weekly  -Metformin 1000 mg BID  -Jardiance 25 mg daily     He uses a glucometer, average AM fasting glucose over the past few months is 120 mg/dL. Possibly 1 episode of hypoglycemia a few weeks ago associated with activity.     Walking 7-8 miles per day.     Objective:    Appears well today, /60, 74 kg     6/2022: Thyroid examined and normal. No cervical LAD. No lipohypertrophy/lipodystrophy at his abdominal insulin injection sites. DM foot exam performed and normal.     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 No underlying masses. No bile duct dilatation. No pancreatic duct dilatation.  -6/2022: C-peptide 1.8 ng/mL with concurrent glucose 154 mg/dL   -10/2022: HbA1c 7.5% (6/2022 was 7.2%)   # No diabetic retinopathy, most recent DM eye exam 11/2021  # Hypertension, on lisinopril   -11/2021: urine microalbumin 70.8 mg/g Cr  -10/2022: GFR >90, urine microalbumin undetectable (previously mildly elevated)   # No definite peripheral neuropathy, no prior DM foot ulceration  # No prior ASCVD event  # Dyslipidemia, on pravastatin 80 mg daily and ASA  -10/2022: , HDL 55, LDL 75, TG 79  # Hepatic steatosis  -He has intranasal glucagon at home    Because Efrain's fasting glucose averages 120 mg/deciliter but his hemoglobin A1c is 7.5% this  implies that he has postprandial hyperglycemia.  Therefore we will increase Trulicity to 3 mg weekly.  He will let me know how he is doing in a few weeks and he will check a glucose throughout the day.  At that time I will place orders for return visit and labs, likely in 6 months.    26 minutes spent on the date of the encounter doing chart review, history and exam, documentation and further activities as noted above.       Again, thank you for allowing me to participate in the care of your patient.        Sincerely,        Nasir Young MD

## 2022-11-01 NOTE — PROGRESS NOTES
Subjective:    Established patient    Ced Fajardo is a 66 year old adult who presents to review DM.     Current DM therapy:  -Tresiba 45-0-0-0  -Trulicity 1.5 mg weekly  -Metformin 1000 mg BID  -Jardiance 25 mg daily     He uses a glucometer, average AM fasting glucose over the past few months is 120 mg/dL. Possibly 1 episode of hypoglycemia a few weeks ago associated with activity.     Walking 7-8 miles per day.     Objective:    Appears well today, /60, 74 kg     6/2022: Thyroid examined and normal. No cervical LAD. No lipohypertrophy/lipodystrophy at his abdominal insulin injection sites. DM foot exam performed and normal.     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 No underlying masses. No bile duct dilatation. No pancreatic duct dilatation.  -6/2022: C-peptide 1.8 ng/mL with concurrent glucose 154 mg/dL   -10/2022: HbA1c 7.5% (6/2022 was 7.2%)   # No diabetic retinopathy, most recent DM eye exam 11/2021  # Hypertension, on lisinopril   -11/2021: urine microalbumin 70.8 mg/g Cr  -10/2022: GFR >90, urine microalbumin undetectable (previously mildly elevated)   # No definite peripheral neuropathy, no prior DM foot ulceration  # No prior ASCVD event  # Dyslipidemia, on pravastatin 80 mg daily and ASA  -10/2022: , HDL 55, LDL 75, TG 79  # Hepatic steatosis  -He has intranasal glucagon at home    Because Efrain's fasting glucose averages 120 mg/deciliter but his hemoglobin A1c is 7.5% this implies that he has postprandial hyperglycemia.  Therefore we will increase Trulicity to 3 mg weekly.  He will let me know how he is doing in a few weeks and he will check a glucose throughout the day.  At that time I will place orders for return visit and  labs, likely in 6 months.    26 minutes spent on the date of the encounter doing chart review, history and exam, documentation and further activities as noted above.

## 2022-12-18 DIAGNOSIS — I10 ESSENTIAL HYPERTENSION: ICD-10-CM

## 2022-12-18 RX ORDER — LISINOPRIL 10 MG/1
TABLET ORAL
Qty: 90 TABLET | Refills: 0 | Status: SHIPPED | OUTPATIENT
Start: 2022-12-18 | End: 2023-03-15

## 2022-12-19 NOTE — TELEPHONE ENCOUNTER
"Last Written Prescription Date:  11/4/21  Last Fill Quantity: 90,  # refills: 3   Last office visit provider:  2/28/22     Requested Prescriptions   Pending Prescriptions Disp Refills     lisinopril (ZESTRIL) 10 MG tablet [Pharmacy Med Name: LISINOPRIL 10MG TABLETS] 90 tablet 3     Sig: TAKE 1 TABLET(10 MG) BY MOUTH DAILY       ACE Inhibitors (Including Combos) Protocol Passed - 12/18/2022  5:43 AM        Passed - Blood pressure under 140/90 in past 12 months     BP Readings from Last 3 Encounters:   11/01/22 138/60   06/21/22 132/70   02/28/22 132/62                 Passed - Recent (12 mo) or future (30 days) visit within the authorizing provider's specialty     Patient has had an office visit with the authorizing provider or a provider within the authorizing providers department within the previous 12 mos or has a future within next 30 days. See \"Patient Info\" tab in inbasket, or \"Choose Columns\" in Meds & Orders section of the refill encounter.              Passed - Medication is active on med list        Passed - Patient is age 18 or older        Passed - Normal serum creatinine on file in past 12 months     Recent Labs   Lab Test 10/28/22  0931   CR 0.88       Ok to refill medication if creatinine is low          Passed - Normal serum potassium on file in past 12 months     Recent Labs   Lab Test 04/13/22  0934   POTASSIUM 4.8                  Birdie Scruggs RN 12/18/22 6:04 PM  "

## 2023-01-17 DIAGNOSIS — E78.5 DYSLIPIDEMIA: ICD-10-CM

## 2023-01-18 RX ORDER — PRAVASTATIN SODIUM 80 MG/1
80 TABLET ORAL DAILY
Qty: 90 TABLET | Refills: 0 | Status: SHIPPED | OUTPATIENT
Start: 2023-01-18 | End: 2023-03-30

## 2023-01-18 NOTE — TELEPHONE ENCOUNTER
"Last Written Prescription Date:  1/31/22  Last Fill Quantity: 90,  # refills: 3   Last office visit provider:  2/28/22     Requested Prescriptions   Pending Prescriptions Disp Refills     pravastatin (PRAVACHOL) 80 MG tablet [Pharmacy Med Name: PRAVASTATIN 80MG TABLETS] 90 tablet 3     Sig: TAKE 1 TABLET(80 MG) BY MOUTH DAILY       Statins Protocol Passed - 1/18/2023  2:41 PM        Passed - LDL on file in past 12 months     Recent Labs   Lab Test 10/28/22  0931   LDL 75             Passed - No abnormal creatine kinase in past 12 months     No lab results found.             Passed - Recent (12 mo) or future (30 days) visit within the authorizing provider's specialty     Patient has had an office visit with the authorizing provider or a provider within the authorizing providers department within the previous 12 mos or has a future within next 30 days. See \"Patient Info\" tab in inbasket, or \"Choose Columns\" in Meds & Orders section of the refill encounter.              Passed - Medication is active on med list        Passed - Patient is age 18 or older             Silvestre Pelayo RN 01/18/23 2:41 PM  "

## 2023-01-26 ENCOUNTER — TELEPHONE (OUTPATIENT)
Dept: ENDOCRINOLOGY | Facility: CLINIC | Age: 68
End: 2023-01-26
Payer: MEDICARE

## 2023-01-26 DIAGNOSIS — Z79.4 TYPE 2 DIABETES MELLITUS WITH HYPERGLYCEMIA, WITH LONG-TERM CURRENT USE OF INSULIN (H): Primary | ICD-10-CM

## 2023-01-26 DIAGNOSIS — E11.65 TYPE 2 DIABETES MELLITUS WITH HYPERGLYCEMIA, WITH LONG-TERM CURRENT USE OF INSULIN (H): Primary | ICD-10-CM

## 2023-01-26 RX ORDER — DULAGLUTIDE 1.5 MG/.5ML
1.5 INJECTION, SOLUTION SUBCUTANEOUS
Qty: 2 ML | Refills: 3 | Status: SHIPPED | OUTPATIENT
Start: 2023-01-26 | End: 2023-03-30

## 2023-01-26 NOTE — TELEPHONE ENCOUNTER
Per provider:    put in for trulicity 1.5 mg until he can go back to the 3 mg dose.     I put in for labs to be done before a return visit, summer is fine.     If his glucose is consistently >140 mg/dL he can let me know in the interim for glucose review    Pt informed and appointments scheduled

## 2023-01-26 NOTE — TELEPHONE ENCOUNTER
[facility] Call Center    Phone Message    May a detailed message be left on voicemail: no     Reason for Call: Medication Question or concern regarding medication   Prescription Clarification  Name of Medication: Dulaglutide (TRULICITY) 3 MG/0.5ML SOPN     Prescribing Provider: Hannah    Pharmacy: [facility] #[id number] [address] AT [facility] & [place] [address]      What on the order needs clarification? Pharmacy called stating above script is on backorder and if there is an alternative for the patient in the meantime. Please advise. Michelek you      Action Taken: Message routed to:  Other: endo    Travel Screening: Not Applicable

## 2023-01-29 ENCOUNTER — HEALTH MAINTENANCE LETTER (OUTPATIENT)
Age: 68
End: 2023-01-29

## 2023-03-14 DIAGNOSIS — E11.65 TYPE 2 DIABETES MELLITUS WITH HYPERGLYCEMIA, WITHOUT LONG-TERM CURRENT USE OF INSULIN (H): ICD-10-CM

## 2023-03-14 DIAGNOSIS — I10 ESSENTIAL HYPERTENSION: ICD-10-CM

## 2023-03-15 ENCOUNTER — MYC MEDICAL ADVICE (OUTPATIENT)
Dept: FAMILY MEDICINE | Facility: CLINIC | Age: 68
End: 2023-03-15
Payer: MEDICARE

## 2023-03-15 DIAGNOSIS — I10 ESSENTIAL HYPERTENSION: ICD-10-CM

## 2023-03-15 DIAGNOSIS — E11.65 TYPE 2 DIABETES MELLITUS WITH HYPERGLYCEMIA, WITHOUT LONG-TERM CURRENT USE OF INSULIN (H): ICD-10-CM

## 2023-03-15 DIAGNOSIS — Z79.4 TYPE 2 DIABETES MELLITUS WITH HYPERGLYCEMIA, WITH LONG-TERM CURRENT USE OF INSULIN (H): ICD-10-CM

## 2023-03-15 DIAGNOSIS — E11.65 TYPE 2 DIABETES MELLITUS WITH HYPERGLYCEMIA, WITH LONG-TERM CURRENT USE OF INSULIN (H): ICD-10-CM

## 2023-03-15 RX ORDER — LISINOPRIL 10 MG/1
TABLET ORAL
Qty: 30 TABLET | Refills: 0 | Status: SHIPPED | OUTPATIENT
Start: 2023-03-15 | End: 2023-03-16

## 2023-03-15 RX ORDER — LISINOPRIL 10 MG/1
TABLET ORAL
Qty: 90 TABLET | OUTPATIENT
Start: 2023-03-15

## 2023-03-15 NOTE — TELEPHONE ENCOUNTER
"Routing refill request to provider for review/approval because:  Patient needs to be seen because it has been more than 1 year since last office visit.    Last Written Prescription Date:  12/18/22  Last Fill Quantity: 90,  # refills: 0   Last office visit provider:  2/28/22     Requested Prescriptions   Pending Prescriptions Disp Refills     lisinopril (ZESTRIL) 10 MG tablet [Pharmacy Med Name: LISINOPRIL 10MG TABLETS] 90 tablet 0     Sig: TAKE 1 TABLET(10 MG) BY MOUTH DAILY       ACE Inhibitors (Including Combos) Protocol Failed - 3/14/2023 12:59 PM        Failed - Recent (12 mo) or future (30 days) visit within the authorizing provider's specialty     Patient has had an office visit with the authorizing provider or a provider within the authorizing providers department within the previous 12 mos or has a future within next 30 days. See \"Patient Info\" tab in inbasket, or \"Choose Columns\" in Meds & Orders section of the refill encounter.              Passed - Blood pressure under 140/90 in past 12 months     BP Readings from Last 3 Encounters:   11/01/22 138/60   06/21/22 132/70   02/28/22 132/62                 Passed - Medication is active on med list        Passed - Patient is age 18 or older        Passed - Normal serum creatinine on file in past 12 months     Recent Labs   Lab Test 10/28/22  0931   CR 0.88       Ok to refill medication if creatinine is low          Passed - Normal serum potassium on file in past 12 months     Recent Labs   Lab Test 04/13/22  0934   POTASSIUM 4.8                metFORMIN (GLUCOPHAGE) 1000 MG tablet [Pharmacy Med Name: METFORMIN 1000MG TABLETS] 180 tablet 0     Sig: TAKE 1 TABLET(1000 MG) BY MOUTH EVERY 12 HOURS       Biguanide Agents Failed - 3/14/2023 12:59 PM        Failed - Recent (6 mo) or future (30 days) visit within the authorizing provider's specialty     Patient had office visit in the last 6 months or has a visit in the next 30 days with authorizing provider or within " "the authorizing provider's specialty.  See \"Patient Info\" tab in inbasket, or \"Choose Columns\" in Meds & Orders section of the refill encounter.            Passed - Patient is age 10 or older        Passed - Patient has documented A1c within the specified period of time.     If HgbA1C is 8 or greater, it needs to be on file within the past 3 months.  If less than 8, must be on file within the past 6 months.     Recent Labs   Lab Test 10/28/22  0931   A1C 7.5*             Passed - Patient's CR is NOT>1.4 OR Patient's EGFR is NOT<45 within past 12 mos.     Recent Labs   Lab Test 10/28/22  0931 11/04/21  0738 10/30/20  0834   GFRESTIMATED >90   < > >60   GFRESTBLACK  --   --  >60    < > = values in this interval not displayed.       Recent Labs   Lab Test 10/28/22  0931   CR 0.88             Passed - Patient does NOT have a diagnosis of CHF.        Passed - Medication is active on med list             Beth Victor RN 03/14/23 8:15 PM  "

## 2023-03-15 NOTE — TELEPHONE ENCOUNTER
"Duplicate. Refills processed on 3/15/2023. Refills declined.       Requested Prescriptions   Pending Prescriptions Disp Refills     lisinopril (ZESTRIL) 10 MG tablet [Pharmacy Med Name: LISINOPRIL 10MG TABLETS] 90 tablet      Sig: TAKE 1 TABLET(10 MG) BY MOUTH DAILY       ACE Inhibitors (Including Combos) Protocol Failed - 3/15/2023  7:50 AM        Failed - Recent (12 mo) or future (30 days) visit within the authorizing provider's specialty     Patient has had an office visit with the authorizing provider or a provider within the authorizing providers department within the previous 12 mos or has a future within next 30 days. See \"Patient Info\" tab in inbasket, or \"Choose Columns\" in Meds & Orders section of the refill encounter.              Passed - Blood pressure under 140/90 in past 12 months     BP Readings from Last 3 Encounters:   11/01/22 138/60   06/21/22 132/70   02/28/22 132/62                 Passed - Medication is active on med list        Passed - Patient is age 18 or older        Passed - Normal serum creatinine on file in past 12 months     Recent Labs   Lab Test 10/28/22  0931   CR 0.88       Ok to refill medication if creatinine is low          Passed - Normal serum potassium on file in past 12 months     Recent Labs   Lab Test 04/13/22  0934   POTASSIUM 4.8                metFORMIN (GLUCOPHAGE) 1000 MG tablet [Pharmacy Med Name: METFORMIN 1000MG TABLETS] 180 tablet      Sig: TAKE 1 TABLET(1000 MG) BY MOUTH EVERY 12 HOURS       Biguanide Agents Failed - 3/15/2023  7:50 AM        Failed - Recent (6 mo) or future (30 days) visit within the authorizing provider's specialty     Patient had office visit in the last 6 months or has a visit in the next 30 days with authorizing provider or within the authorizing provider's specialty.  See \"Patient Info\" tab in inbasket, or \"Choose Columns\" in Meds & Orders section of the refill encounter.            Passed - Patient is age 10 or older        Passed - " Patient has documented A1c within the specified period of time.     If HgbA1C is 8 or greater, it needs to be on file within the past 3 months.  If less than 8, must be on file within the past 6 months.     Recent Labs   Lab Test 10/28/22  0931   A1C 7.5*             Passed - Patient's CR is NOT>1.4 OR Patient's EGFR is NOT<45 within past 12 mos.     Recent Labs   Lab Test 10/28/22  0931 11/04/21  0738 10/30/20  0834   GFRESTIMATED >90   < > >60   GFRESTBLACK  --   --  >60    < > = values in this interval not displayed.       Recent Labs   Lab Test 10/28/22  0931   CR 0.88             Passed - Patient does NOT have a diagnosis of CHF.        Passed - Medication is active on med list             Narda Bledsoe RN 03/15/23 5:46 PM

## 2023-03-16 RX ORDER — LISINOPRIL 10 MG/1
10 TABLET ORAL DAILY
Qty: 90 TABLET | Refills: 0 | Status: SHIPPED | OUTPATIENT
Start: 2023-03-16 | End: 2023-03-30

## 2023-03-17 NOTE — TELEPHONE ENCOUNTER
Writer called patient and assisted with scheduling a preventative visit for 3/30/23.    LILI Parrish, RN  [facility]

## 2023-03-30 ENCOUNTER — TELEPHONE (OUTPATIENT)
Dept: FAMILY MEDICINE | Facility: CLINIC | Age: 68
End: 2023-03-30

## 2023-03-30 ENCOUNTER — OFFICE VISIT (OUTPATIENT)
Dept: FAMILY MEDICINE | Facility: CLINIC | Age: 68
End: 2023-03-30
Payer: MEDICARE

## 2023-03-30 VITALS
RESPIRATION RATE: 20 BRPM | HEART RATE: 86 BPM | HEIGHT: 66 IN | TEMPERATURE: 97.9 F | DIASTOLIC BLOOD PRESSURE: 62 MMHG | BODY MASS INDEX: 25.94 KG/M2 | OXYGEN SATURATION: 95 % | SYSTOLIC BLOOD PRESSURE: 104 MMHG | WEIGHT: 161.4 LBS

## 2023-03-30 DIAGNOSIS — E83.52 HYPERCALCEMIA: ICD-10-CM

## 2023-03-30 DIAGNOSIS — E78.5 DYSLIPIDEMIA: ICD-10-CM

## 2023-03-30 DIAGNOSIS — Z23 HIGH PRIORITY FOR 2019-NCOV VACCINE: ICD-10-CM

## 2023-03-30 DIAGNOSIS — Z79.4 TYPE 2 DIABETES MELLITUS WITH HYPERGLYCEMIA, WITH LONG-TERM CURRENT USE OF INSULIN (H): ICD-10-CM

## 2023-03-30 DIAGNOSIS — Z79.899 MEDICATION MANAGEMENT: ICD-10-CM

## 2023-03-30 DIAGNOSIS — N52.9 ERECTILE DYSFUNCTION, UNSPECIFIED ERECTILE DYSFUNCTION TYPE: ICD-10-CM

## 2023-03-30 DIAGNOSIS — E11.65 TYPE 2 DIABETES MELLITUS WITH HYPERGLYCEMIA, WITH LONG-TERM CURRENT USE OF INSULIN (H): ICD-10-CM

## 2023-03-30 DIAGNOSIS — Z13.6 ENCOUNTER FOR ABDOMINAL AORTIC ANEURYSM (AAA) SCREENING: ICD-10-CM

## 2023-03-30 DIAGNOSIS — Z00.00 ENCOUNTER FOR MEDICARE ANNUAL WELLNESS EXAM: Primary | ICD-10-CM

## 2023-03-30 DIAGNOSIS — I10 ESSENTIAL HYPERTENSION: ICD-10-CM

## 2023-03-30 DIAGNOSIS — Z12.5 PROSTATE CANCER SCREENING: ICD-10-CM

## 2023-03-30 LAB
ALBUMIN SERPL BCG-MCNC: 4.5 G/DL (ref 3.5–5.2)
ALBUMIN UR-MCNC: NEGATIVE MG/DL
ALP SERPL-CCNC: 59 U/L (ref 35–129)
ALT SERPL W P-5'-P-CCNC: 22 U/L (ref 10–50)
ANION GAP SERPL CALCULATED.3IONS-SCNC: 13 MMOL/L (ref 7–15)
APPEARANCE UR: CLEAR
AST SERPL W P-5'-P-CCNC: 21 U/L (ref 10–50)
BILIRUB SERPL-MCNC: 0.3 MG/DL
BILIRUB UR QL STRIP: NEGATIVE
BUN SERPL-MCNC: 16.3 MG/DL (ref 8–23)
CALCIUM SERPL-MCNC: 10.5 MG/DL (ref 8.8–10.2)
CHLORIDE SERPL-SCNC: 100 MMOL/L (ref 98–107)
COLOR UR AUTO: YELLOW
CREAT SERPL-MCNC: 0.81 MG/DL (ref 0.51–1.17)
DEPRECATED HCO3 PLAS-SCNC: 27 MMOL/L (ref 22–29)
ERYTHROCYTE [DISTWIDTH] IN BLOOD BY AUTOMATED COUNT: 12.1 % (ref 10–15)
FASTING STATUS PATIENT QL REPORTED: NO
GFR SERPL CREATININE-BSD FRML MDRD: >90 ML/MIN/1.73M2
GLUCOSE SERPL-MCNC: 128 MG/DL (ref 70–99)
GLUCOSE SERPL-MCNC: 128 MG/DL (ref 70–99)
GLUCOSE UR STRIP-MCNC: 500 MG/DL
HBA1C MFR BLD: 7.3 % (ref 0–5.6)
HCT VFR BLD AUTO: 44.6 % (ref 35–53)
HGB BLD-MCNC: 15.5 G/DL (ref 11.7–17.7)
HGB UR QL STRIP: NEGATIVE
KETONES UR STRIP-MCNC: NEGATIVE MG/DL
LEUKOCYTE ESTERASE UR QL STRIP: NEGATIVE
MCH RBC QN AUTO: 32.6 PG (ref 26.5–33)
MCHC RBC AUTO-ENTMCNC: 34.8 G/DL (ref 31.5–36.5)
MCV RBC AUTO: 94 FL (ref 78–100)
NITRATE UR QL: NEGATIVE
PH UR STRIP: 5.5 [PH] (ref 5–8)
PLATELET # BLD AUTO: 184 10E3/UL (ref 150–450)
POTASSIUM SERPL-SCNC: 4.5 MMOL/L (ref 3.4–5.3)
PROT SERPL-MCNC: 7.6 G/DL (ref 6.4–8.3)
PSA SERPL DL<=0.01 NG/ML-MCNC: 3.37 NG/ML (ref 0–4.5)
RBC # BLD AUTO: 4.75 10E6/UL (ref 3.8–5.9)
SODIUM SERPL-SCNC: 140 MMOL/L (ref 136–145)
SP GR UR STRIP: 1.02 (ref 1–1.03)
TSH SERPL DL<=0.005 MIU/L-ACNC: 1.65 UIU/ML (ref 0.3–4.2)
UROBILINOGEN UR STRIP-ACNC: 0.2 E.U./DL
WBC # BLD AUTO: 4.8 10E3/UL (ref 4–11)

## 2023-03-30 PROCEDURE — 90677 PCV20 VACCINE IM: CPT | Performed by: NURSE PRACTITIONER

## 2023-03-30 PROCEDURE — 36415 COLL VENOUS BLD VENIPUNCTURE: CPT | Performed by: NURSE PRACTITIONER

## 2023-03-30 PROCEDURE — 0052A COVID-19 VACCINE 12+ (PFIZER): CPT | Performed by: NURSE PRACTITIONER

## 2023-03-30 PROCEDURE — 85027 COMPLETE CBC AUTOMATED: CPT | Performed by: NURSE PRACTITIONER

## 2023-03-30 PROCEDURE — 99214 OFFICE O/P EST MOD 30 MIN: CPT | Mod: 25 | Performed by: NURSE PRACTITIONER

## 2023-03-30 PROCEDURE — 80053 COMPREHEN METABOLIC PANEL: CPT | Performed by: NURSE PRACTITIONER

## 2023-03-30 PROCEDURE — 84443 ASSAY THYROID STIM HORMONE: CPT | Performed by: NURSE PRACTITIONER

## 2023-03-30 PROCEDURE — G0402 INITIAL PREVENTIVE EXAM: HCPCS | Performed by: NURSE PRACTITIONER

## 2023-03-30 PROCEDURE — 81003 URINALYSIS AUTO W/O SCOPE: CPT | Performed by: NURSE PRACTITIONER

## 2023-03-30 PROCEDURE — 91305 COVID-19 VACCINE 12+ (PFIZER): CPT | Performed by: NURSE PRACTITIONER

## 2023-03-30 PROCEDURE — 82306 VITAMIN D 25 HYDROXY: CPT | Performed by: NURSE PRACTITIONER

## 2023-03-30 PROCEDURE — G0103 PSA SCREENING: HCPCS | Performed by: NURSE PRACTITIONER

## 2023-03-30 PROCEDURE — G0009 ADMIN PNEUMOCOCCAL VACCINE: HCPCS | Performed by: NURSE PRACTITIONER

## 2023-03-30 RX ORDER — PRAVASTATIN SODIUM 80 MG/1
80 TABLET ORAL DAILY
Qty: 90 TABLET | Refills: 3 | Status: SHIPPED | OUTPATIENT
Start: 2023-03-30 | End: 2023-12-01

## 2023-03-30 RX ORDER — LISINOPRIL 10 MG/1
10 TABLET ORAL DAILY
Qty: 90 TABLET | Refills: 3 | Status: SHIPPED | OUTPATIENT
Start: 2023-03-30 | End: 2023-04-14

## 2023-03-30 RX ORDER — SILDENAFIL 50 MG/1
25-50 TABLET, FILM COATED ORAL DAILY PRN
Qty: 30 TABLET | Refills: 5 | Status: SHIPPED | OUTPATIENT
Start: 2023-03-30 | End: 2023-12-01

## 2023-03-30 RX ORDER — INSULIN DEGLUDEC 100 U/ML
35 INJECTION, SOLUTION SUBCUTANEOUS DAILY
COMMUNITY
Start: 2022-06-21 | End: 2023-08-17

## 2023-03-30 ASSESSMENT — PAIN SCALES - GENERAL: PAINLEVEL: NO PAIN (0)

## 2023-03-30 ASSESSMENT — ENCOUNTER SYMPTOMS
BREAST MASS: 0
DIZZINESS: 0
PALPITATIONS: 0
HEMATOCHEZIA: 0
NERVOUS/ANXIOUS: 0
DYSURIA: 0
NAUSEA: 0
WEAKNESS: 0
HEMATURIA: 0
DIARRHEA: 0
FREQUENCY: 0
CHILLS: 0
ABDOMINAL PAIN: 0
HEARTBURN: 0
MYALGIAS: 0
ARTHRALGIAS: 0
EYE PAIN: 0
HEADACHES: 0
PARESTHESIAS: 0
JOINT SWELLING: 0
FEVER: 0
SORE THROAT: 0
COUGH: 0
SHORTNESS OF BREATH: 0
CONSTIPATION: 0

## 2023-03-30 ASSESSMENT — ACTIVITIES OF DAILY LIVING (ADL): CURRENT_FUNCTION: NO ASSISTANCE NEEDED

## 2023-03-30 NOTE — PROGRESS NOTES
"SUBJECTIVE:   beverley is a 67 year old who presents for Preventive Visit.    Patient has been  for 42 years.  He has no concerns for STDs.  He has 2 children.  He is consuming a healthy diet.  He exercises by rowing, walking, and using an elliptical.  He has type 2 diabetes and is seeing endocrinology. Last A1C is 7.5% on 10/28/22.   He denies any sores on his feet.  His last eye exam was in December.  He is prescribed Tresiba, metformin, Jardiance, Trulicity.  He is taking pravastatin for lipid management.  Last cholesterol check was on 10/28/2022 with a total cholesterol 146, triglycerides 79, HDL 55, LDL 75.  Blood pressure is controlled lisinopril.  He uses sildenafil as needed for male erectile dysfunction. He has been experiencing bilateral ear fullness.  He has a history of cerumen impaction.     Additional Questions 2/28/2022   Roomed by Robyn       Are you in the first 12 months of your Medicare coverage?  Yes,    Both Eyes: patient was recently at the eye doctor in December and everything looked good and patient states that eye dr didn't see any issues with his eyes in terms of his diabetes.     Healthy Habits:     In general, how would you rate your overall health?  Excellent    Frequency of exercise:  4-5 days/week    Duration of exercise:  30-45 minutes    Do you usually eat at least 4 servings of fruit and vegetables a day, include whole grains    & fiber and avoid regularly eating high fat or \"junk\" foods?  Yes    Taking medications regularly:  Yes    Medication side effects:  None    Ability to successfully perform activities of daily living:  No assistance needed    Home Safety:  Throw rugs in the hallway and lack of grab bars in the bathroom    Hearing Impairment:  Difficulty following a conversation in a noisy restaurant or crowded room, need to ask people to speak up or repeat themselves, find that men's voices are easier to understand than woman's and difficulty understanding soft or " whispered speech    In the past 6 months, have you been bothered by leaking of urine?  No    In general, how would you rate your overall mental or emotional health?  Excellent      PHQ-2 Total Score: 0    Additional concerns today:  No      Have you ever done Advance Care Planning? (For example, a Health Directive, POLST, or a discussion with a medical provider or your loved ones about your wishes): Yes, advance care planning is on file.       Fall risk  Fallen 2 or more times in the past year?: No  Any fall with injury in the past year?: No    Cognitive Screening   1) Repeat 3 items (Leader, Season, Table)    2) Clock draw: NORMAL  3) 3 item recall: Recalls 2 objects   Results: 3 items recalled: COGNITIVE IMPAIRMENT LESS LIKELY    Mini-CogTM Copyright S Chyna. Licensed by the author for use in [facility]; reprinted with permission (sotero@[facility]). All rights reserved.          Reviewed and updated as needed this visit by clinical staff   Tobacco  Allergies  Meds  Problems  Med Hx  Surg Hx  Fam Hx          Reviewed and updated as needed this visit by Provider   Tobacco     Med Hx  Surg Hx  Fam Hx         Social History     Tobacco Use     Smoking status: Never     Passive exposure: Never     Smokeless tobacco: Never   Substance Use Topics     Alcohol use: Yes     Comment: rare         Alcohol Use 3/30/2023   Prescreen: >3 drinks/day or >7 drinks/week? No      Do you have a current opioid prescription? no  Do you use any other controlled substances or medications that are not prescribed by a provider? None              Current providers sharing in care for this patient include:   Patient Care Team:  Raquel Reddy APRN CNP as PCP - Julieta Cano PharmD as Pharmacist (Pharmacist)  Raquel Reddy APRN CNP as Assigned PCP  Nasir Young MD as MD (Endocrinology, Diabetes, and Metabolism)  Nasir Young MD as Assigned Endocrinology Provider    The following health  maintenance items are reviewed in Epic and correct as of today:  Health Maintenance   Topic Date Due     DEXA  Never done     DIABETIC FOOT EXAM  Never done     ZOSTER IMMUNIZATION (1 of 2) Never done     Pneumococcal Vaccine: 65+ Years (2 - PCV) 01/28/2012     COVID-19 Vaccine (2 - Booster for Kylie series) 06/03/2021     EYE EXAM  11/19/2022     MEDICARE ANNUAL WELLNESS VISIT  12/02/2022     AORTIC ANEURYSM SCREENING (SYSTEM ASSIGNED)  12/02/2022     ANNUAL REVIEW OF HM ORDERS  02/28/2023     BMP  04/13/2023     A1C  04/28/2023     LIPID  10/28/2023     MICROALBUMIN  10/28/2023     FALL RISK ASSESSMENT  03/30/2024     COLORECTAL CANCER SCREENING  07/27/2025     ADVANCE CARE PLANNING  03/30/2028     DTAP/TDAP/TD IMMUNIZATION (3 - Td or Tdap) 03/26/2029     HEPATITIS C SCREENING  Completed     HIV SCREENING  Completed     PHQ-2 (once per calendar year)  Completed     INFLUENZA VACCINE  Completed     IPV IMMUNIZATION  Aged Out     MENINGITIS IMMUNIZATION  Aged Out         Review of Systems   Constitutional: Negative for chills and fever.   HENT: Negative for congestion, ear pain, hearing loss and sore throat.    Eyes: Negative for pain and visual disturbance.   Respiratory: Negative for cough and shortness of breath.    Cardiovascular: Negative for chest pain and palpitations.   Gastrointestinal: Negative for abdominal pain, constipation, diarrhea and nausea.   Genitourinary: Negative for dysuria, frequency, genital sores, hematuria and urgency.   Musculoskeletal: Negative for arthralgias, joint swelling and myalgias.   Skin: Negative for rash.   Neurological: Negative for dizziness, weakness and headaches.   Psychiatric/Behavioral: The patient is not nervous/anxious.      Constitutional, HEENT, cardiovascular, pulmonary, gi and gu systems are negative, except as otherwise noted.    OBJECTIVE:   /62 (BP Location: Right arm, Patient Position: Sitting, Cuff Size: Adult Regular)   Pulse 86   Temp 97.9  F (36.6  " C) (Temporal)   Resp 20   Ht 1.683 m (5' 6.25\")   Wt 73.2 kg (161 lb 6.4 oz)   SpO2 95%   BMI 25.85 kg/m   Estimated body mass index is 25.85 kg/m  as calculated from the following:    Height as of this encounter: 1.683 m (5' 6.25\").    Weight as of this encounter: 73.2 kg (161 lb 6.4 oz).  Physical Exam  GENERAL: healthy, alert and no distress  EYES: Eyes grossly normal to inspection, PERRL and conjunctivae and sclerae normal  HENT: ear canals and TM's normal, nose and mouth without ulcers or lesions  NECK: no adenopathy, no asymmetry, masses, or scars and thyroid normal to palpation  RESP: lungs clear to auscultation - no rales, rhonchi or wheezes  CV: regular rate and rhythm, normal S1 S2, no S3 or S4, no murmur, click or rub, no peripheral edema and peripheral pulses strong  ABDOMEN: soft, nontender, no hepatosplenomegaly, no masses and bowel sounds normal  MS: no gross musculoskeletal defects noted, no edema  SKIN: no suspicious lesions or rashes  NEURO: Normal strength and tone, mentation intact and speech normal  PSYCH: mentation appears normal, affect normal/bright      ASSESSMENT / PLAN:   Encounter for Medicare annual wellness exam  Recommend consuming a healthy diet and exercising.  Provided pneumococcal and COVID vaccines.  He will consider obtaining the shingles vaccine at the pharmacy.  AAA screening ordered.  He is up-to-date on colorectal cancer screening.  - CBC with platelets  - TSH with free T4 reflex  - UA Macro with Reflex to Micro and Culture - lab collect  - CBC with platelets  - TSH with free T4 reflex  - UA Macro with Reflex to Micro and Culture - lab collect    Prostate cancer screening  - Prostate Specific Antigen Screen  - Prostate Specific Antigen Screen    Encounter for abdominal aortic aneurysm (AAA) screening  - US Abdominal Aorta Imaging    High priority for 2019-nCoV vaccine  - COVID-19,PF,PFIZER (12+ Yrs Primary Series-GRAY LABEL)    Type 2 diabetes mellitus with " "hyperglycemia, with long-term current use of insulin (H)  Patient is seeing endocrinology.  He continues Trulicity, Tresiba, metformin, Jardiance as prescribed.  - Hemoglobin A1c  - Glucose    Essential hypertension  This is controlled with lisinopril.  - lisinopril (ZESTRIL) 10 MG tablet  Dispense: 90 tablet; Refill: 3    Dyslipidemia  He continues pravastatin for lipid management.  Goal LDL is less than 100.  - pravastatin (PRAVACHOL) 80 MG tablet  Dispense: 90 tablet; Refill: 3    Erectile dysfunction, unspecified erectile dysfunction type  He continues sildenafil as needed.  - sildenafil (VIAGRA) 50 MG tablet  Dispense: 30 tablet; Refill: 5    Medication management  - Comprehensive metabolic panel  - Comprehensive metabolic panel            COUNSELING:  Reviewed preventive health counseling, as reflected in patient instructions       Consider AAA screening for ages 65-75 and smoking history       Regular exercise       Healthy diet/nutrition       Vision screening       Hearing screening       Dental care       Prostate cancer screening      BMI:   Estimated body mass index is 25.85 kg/m  as calculated from the following:    Height as of this encounter: 1.683 m (5' 6.25\").    Weight as of this encounter: 73.2 kg (161 lb 6.4 oz).   Weight management plan: Discussed healthy diet and exercise guidelines      He reports that he has never smoked. He has never been exposed to tobacco smoke. He has never used smokeless tobacco.      Appropriate preventive services were discussed with this patient, including applicable screening as appropriate for cardiovascular disease, diabetes, osteopenia/osteoporosis, and glaucoma.  As appropriate for age/gender, discussed screening for colorectal cancer, prostate cancer, breast cancer, and cervical cancer. Checklist reviewing preventive services available has been given to the patient.    Reviewed patients plan of care and provided an AVS. The Basic Care Plan (routine screening as " documented in Health Maintenance) for Ced meets the Care Plan requirement. This Care Plan has been established and reviewed with the Patient.          JUAN Ruiz CNP  [facility]    Identified Health Risks:    I have reviewed Opioid Use Disorder and Substance Use Disorder risk factors and made any needed referrals.     Answers for HPI/ROS submitted by the patient on 3/30/2023  Blood in stool: No  heartburn: No  peripheral edema: No  mood changes: No  Skin sensation changes: No  pelvic pain: No  vaginal bleeding: No  vaginal discharge: No  tenderness: No  breast mass: No  breast discharge: No  impotence: Yes  penile discharge: No

## 2023-03-30 NOTE — TELEPHONE ENCOUNTER
Faxed over release of information to [place] [facility] to get patients diabetic eye exam for his chart here. Faxed to [phone]. Sent copy to scanning for patients chart. Original copy kept in case needing to resend.

## 2023-03-31 DIAGNOSIS — E83.52 HYPERCALCEMIA: Primary | ICD-10-CM

## 2023-04-03 LAB — DEPRECATED CALCIDIOL+CALCIFEROL SERPL-MC: 37 UG/L (ref 20–75)

## 2023-04-04 ENCOUNTER — HOSPITAL ENCOUNTER (OUTPATIENT)
Dept: ULTRASOUND IMAGING | Facility: HOSPITAL | Age: 68
Discharge: HOME | End: 2023-04-04
Attending: NURSE PRACTITIONER | Admitting: NURSE PRACTITIONER
Payer: MEDICARE

## 2023-04-04 DIAGNOSIS — E11.65 TYPE 2 DIABETES MELLITUS WITH HYPERGLYCEMIA, WITH LONG-TERM CURRENT USE OF INSULIN (H): Primary | ICD-10-CM

## 2023-04-04 DIAGNOSIS — Z13.6 ENCOUNTER FOR ABDOMINAL AORTIC ANEURYSM (AAA) SCREENING: ICD-10-CM

## 2023-04-04 DIAGNOSIS — Z79.4 TYPE 2 DIABETES MELLITUS WITH HYPERGLYCEMIA, WITH LONG-TERM CURRENT USE OF INSULIN (H): Primary | ICD-10-CM

## 2023-04-04 PROCEDURE — 76775 US EXAM ABDO BACK WALL LIM: CPT

## 2023-04-04 RX ORDER — INSULIN GLARGINE 100 [IU]/ML
INJECTION, SOLUTION SUBCUTANEOUS
Qty: 45 ML | Refills: 4 | Status: SHIPPED | OUTPATIENT
Start: 2023-04-04 | End: 2023-05-26

## 2023-04-08 DIAGNOSIS — E78.5 DYSLIPIDEMIA: ICD-10-CM

## 2023-04-09 RX ORDER — PRAVASTATIN SODIUM 80 MG/1
TABLET ORAL
Qty: 90 TABLET | Refills: 3 | OUTPATIENT
Start: 2023-04-09

## 2023-04-09 NOTE — TELEPHONE ENCOUNTER
" Routing refill request to provider for review/approval because:  duplicate    Last Written Prescription Date:  3/30/2023  Last Fill Quantity: 90,  # refills: 3   Last office visit provider:  3/30/2023     Requested Prescriptions   Refused Prescriptions Disp Refills     pravastatin (PRAVACHOL) 80 MG tablet [Pharmacy Med Name: PRAVASTATIN 80MG TABLETS] 90 tablet 3     Sig: TAKE 1 TABLET(80 MG) BY MOUTH DAILY       Statins Protocol Passed - 4/9/2023 11:36 AM        Passed - LDL on file in past 12 months     Recent Labs   Lab Test 10/28/22  0931   LDL 75             Passed - No abnormal creatine kinase in past 12 months     No lab results found.             Passed - Recent (12 mo) or future (30 days) visit within the authorizing provider's specialty     Patient has had an office visit with the authorizing provider or a provider within the authorizing providers department within the previous 12 mos or has a future within next 30 days. See \"Patient Info\" tab in inbasket, or \"Choose Columns\" in Meds & Orders section of the refill encounter.              Passed - Medication is active on med list        Passed - Patient is age 18 or older             Vane Elliott RN 04/09/23 11:39 AM  "

## 2023-04-13 DIAGNOSIS — I10 ESSENTIAL HYPERTENSION: ICD-10-CM

## 2023-04-13 DIAGNOSIS — E11.65 TYPE 2 DIABETES MELLITUS WITH HYPERGLYCEMIA, WITHOUT LONG-TERM CURRENT USE OF INSULIN (H): ICD-10-CM

## 2023-04-14 RX ORDER — LISINOPRIL 10 MG/1
TABLET ORAL
Qty: 90 TABLET | Refills: 3 | Status: SHIPPED | OUTPATIENT
Start: 2023-04-14 | End: 2023-12-01

## 2023-04-14 NOTE — TELEPHONE ENCOUNTER
"Refills still current, however pt requests change of pharmacy.  Done now.    Last Written LISINOPRIL Date:  3/30/2023  Last Fill Quantity: 90,  # refills: 3     Last Written METFORMIN Date:  3/16/2023  Last Fill Quantity: 180,  # refills: 3       Requested Prescriptions   Pending Prescriptions Disp Refills     lisinopril (ZESTRIL) 10 MG tablet [Pharmacy Med Name: LISINOPRIL 10MG TABLETS] 30 tablet      Sig: TAKE 1 TABLET(10 MG) BY MOUTH DAILY       ACE Inhibitors (Including Combos) Protocol Passed - 4/13/2023  5:45 AM        Passed - Blood pressure under 140/90 in past 12 months     BP Readings from Last 3 Encounters:   03/30/23 104/62   11/01/22 138/60   06/21/22 132/70                 Passed - Recent (12 mo) or future (30 days) visit within the authorizing provider's specialty     Patient has had an office visit with the authorizing provider or a provider within the authorizing providers department within the previous 12 mos or has a future within next 30 days. See \"Patient Info\" tab in inbasket, or \"Choose Columns\" in Meds & Orders section of the refill encounter.              Passed - Medication is active on med list        Passed - Patient is age 18 or older        Passed - Normal serum creatinine on file in past 12 months     Recent Labs   Lab Test 03/30/23  1050   CR 0.81       Ok to refill medication if creatinine is low          Passed - Normal serum potassium on file in past 12 months     Recent Labs   Lab Test 03/30/23  1050   POTASSIUM 4.5                metFORMIN (GLUCOPHAGE) 1000 MG tablet [Pharmacy Med Name: METFORMIN 1000MG TABLETS] 60 tablet      Sig: TAKE 1 TABLET(1000 MG) BY MOUTH EVERY 12 HOURS       Biguanide Agents Passed - 4/13/2023  5:45 AM        Passed - Patient is age 10 or older        Passed - Patient has documented A1c within the specified period of time.     If HgbA1C is 8 or greater, it needs to be on file within the past 3 months.  If less than 8, must be on file within the past 6 " "months.     Recent Labs   Lab Test 03/30/23  1050   A1C 7.3*             Passed - Patient's CR is NOT>1.4 OR Patient's EGFR is NOT<45 within past 12 mos.     Recent Labs   Lab Test 03/30/23  1050 11/04/21  0738 10/30/20  0834   GFRESTIMATED >90   < > >60   GFRESTBLACK  --   --  >60    < > = values in this interval not displayed.       Recent Labs   Lab Test 03/30/23  1050   CR 0.81             Passed - Patient does NOT have a diagnosis of CHF.        Passed - Medication is active on med list        Passed - Recent (6 mo) or future (30 days) visit within the authorizing provider's specialty     Patient had office visit in the last 6 months or has a visit in the next 30 days with authorizing provider or within the authorizing provider's specialty.  See \"Patient Info\" tab in inbasket, or \"Choose Columns\" in Meds & Orders section of the refill encounter.                 Sepideh Allen RN 04/14/23 8:41 AM  "

## 2023-05-05 ENCOUNTER — TELEPHONE (OUTPATIENT)
Dept: LAB | Facility: CLINIC | Age: 68
End: 2023-05-05
Payer: MEDICARE

## 2023-05-05 NOTE — PROGRESS NOTES
Ced Fajardo has an upcoming Lab appointment.    Future Appointments   Date Time Provider Department Center   5/19/2023 10:30 AM [facility] LAB CARMELO JEAN [facility]   5/26/2023  1:30 PM Nasir Young MD [facility]END[facility]       No orders are currently in the chart from an Endocrinology provider.    Please place orders or advise patient.    Thanks,     Rin Mao

## 2023-05-06 ENCOUNTER — MYC MEDICAL ADVICE (OUTPATIENT)
Dept: FAMILY MEDICINE | Facility: CLINIC | Age: 68
End: 2023-05-06
Payer: MEDICARE

## 2023-05-06 DIAGNOSIS — E11.65 TYPE 2 DIABETES MELLITUS WITH HYPERGLYCEMIA, WITHOUT LONG-TERM CURRENT USE OF INSULIN (H): ICD-10-CM

## 2023-05-08 ENCOUNTER — TELEPHONE (OUTPATIENT)
Dept: LAB | Facility: CLINIC | Age: 68
End: 2023-05-08
Payer: MEDICARE

## 2023-05-08 DIAGNOSIS — E11.65 TYPE 2 DIABETES MELLITUS WITH HYPERGLYCEMIA, WITH LONG-TERM CURRENT USE OF INSULIN (H): Primary | ICD-10-CM

## 2023-05-08 DIAGNOSIS — Z79.4 TYPE 2 DIABETES MELLITUS WITH HYPERGLYCEMIA, WITH LONG-TERM CURRENT USE OF INSULIN (H): Primary | ICD-10-CM

## 2023-05-08 RX ORDER — FLURBIPROFEN SODIUM 0.3 MG/ML
SOLUTION/ DROPS OPHTHALMIC
Qty: 100 EACH | Refills: 3 | Status: SHIPPED | OUTPATIENT
Start: 2023-05-08 | End: 2024-05-01

## 2023-05-19 ENCOUNTER — LAB (OUTPATIENT)
Dept: LAB | Facility: CLINIC | Age: 68
End: 2023-05-19
Payer: MEDICARE

## 2023-05-19 DIAGNOSIS — Z79.4 TYPE 2 DIABETES MELLITUS WITH HYPERGLYCEMIA, WITH LONG-TERM CURRENT USE OF INSULIN (H): ICD-10-CM

## 2023-05-19 DIAGNOSIS — E11.65 TYPE 2 DIABETES MELLITUS WITH HYPERGLYCEMIA, WITH LONG-TERM CURRENT USE OF INSULIN (H): ICD-10-CM

## 2023-05-19 LAB
CREAT SERPL-MCNC: 0.8 MG/DL (ref 0.51–1.17)
FASTING STATUS PATIENT QL REPORTED: NORMAL
FASTING STATUS PATIENT QL REPORTED: NORMAL
GFR SERPL CREATININE-BSD FRML MDRD: >90 ML/MIN/1.73M2
GLUCOSE SERPL-MCNC: 91 MG/DL (ref 70–99)
GLUCOSE SERPL-MCNC: 93 MG/DL (ref 70–99)
HBA1C MFR BLD: 7.7 % (ref 0–5.6)

## 2023-05-19 PROCEDURE — 83036 HEMOGLOBIN GLYCOSYLATED A1C: CPT

## 2023-05-19 PROCEDURE — 82947 ASSAY GLUCOSE BLOOD QUANT: CPT

## 2023-05-19 PROCEDURE — 36415 COLL VENOUS BLD VENIPUNCTURE: CPT

## 2023-05-19 PROCEDURE — 82565 ASSAY OF CREATININE: CPT

## 2023-05-26 ENCOUNTER — OFFICE VISIT (OUTPATIENT)
Dept: ENDOCRINOLOGY | Facility: CLINIC | Age: 68
End: 2023-05-26
Payer: MEDICARE

## 2023-05-26 VITALS
WEIGHT: 161 LBS | HEART RATE: 84 BPM | SYSTOLIC BLOOD PRESSURE: 120 MMHG | BODY MASS INDEX: 25.79 KG/M2 | DIASTOLIC BLOOD PRESSURE: 62 MMHG

## 2023-05-26 DIAGNOSIS — Z79.4 LONG TERM (CURRENT) USE OF INSULIN (H): ICD-10-CM

## 2023-05-26 DIAGNOSIS — I10 HYPERTENSION, UNSPECIFIED TYPE: ICD-10-CM

## 2023-05-26 DIAGNOSIS — Z79.4 TYPE 2 DIABETES MELLITUS WITH HYPERGLYCEMIA, WITH LONG-TERM CURRENT USE OF INSULIN (H): ICD-10-CM

## 2023-05-26 DIAGNOSIS — E66.3 OVERWEIGHT (BMI 25.0-29.9): ICD-10-CM

## 2023-05-26 DIAGNOSIS — K76.0 HEPATIC STEATOSIS: ICD-10-CM

## 2023-05-26 DIAGNOSIS — E88.819 INSULIN RESISTANCE: ICD-10-CM

## 2023-05-26 DIAGNOSIS — E11.65 TYPE 2 DIABETES MELLITUS WITH HYPERGLYCEMIA, WITH LONG-TERM CURRENT USE OF INSULIN (H): ICD-10-CM

## 2023-05-26 DIAGNOSIS — E78.5 DYSLIPIDEMIA: ICD-10-CM

## 2023-05-26 PROCEDURE — 99214 OFFICE O/P EST MOD 30 MIN: CPT | Performed by: INTERNAL MEDICINE

## 2023-05-26 RX ORDER — GLUCAGON 3 MG/1
3 POWDER NASAL DAILY PRN
Qty: 1 EACH | Refills: 3 | Status: SHIPPED | OUTPATIENT
Start: 2023-05-26

## 2023-05-26 NOTE — LETTER
5/26/2023         RE: Ced Fajardo  [address] [address] [place] [id number]        Dear Colleague,    Thank you for referring your patient, Ced Fajardo, to the [facility]. Please see a copy of my visit note below.    Subjective:    Established patient    Ced Fajardo is a 67 year old adult who presents to review DM.     Current DM therapy:  -Tresiba 35-0-0-0  -Trulicity 1.5 mg weekly; well tolerated  -Metformin 1000 mg BID; well tolerated  -Jardiance 25 mg daily; well tolerated     He uses a glucometer.    Walking 7-8 miles per day.     He has 3 meals per day, dinner is the biggest meal.  Snacks in the mid afternoon.    Objective:    Appears well today, BMI 25.79 kg/m2, /62, 73 kg. Diabetic foot exam performed and normal. No lipohypertrophy/lipodystrophy at his abdominal insulin injection sites.    6/2022: Thyroid examined and normal. No cervical LAD.     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 No underlying masses. No bile duct dilatation. No pancreatic duct dilatation.  -6/2022: C-peptide 1.8 ng/mL with concurrent glucose 154 mg/dL   -10/2022: HbA1c 7.5% (6/2022 was 7.2%)   -3/2023: HbA1c 7.3%  -5/2023: HbA1c 7.7%  -He has intranasal glucagon at home  # No diabetic retinopathy, most recent DM eye exam 12/2022  # Hypertension, on lisinopril   -11/2021: urine microalbumin 70.8 mg/g Cr  -10/2022: GFR >90, urine microalbumin undetectable  -5/2023: GFR >90  # No definite peripheral neuropathy, no prior DM foot ulceration  # No prior ASCVD event  # Dyslipidemia, on pravastatin 80 mg daily and ASA  -10/2022: , HDL 55, LDL 75, TG 79  # Hepatic steatosis    We reviewed his capillary glucose log today.  He  checks a capillary glucose once daily fasting in the morning with a typical range  mg/deciliter.  No documented hypoglycemia and no symptoms suggestive of hypoglycemia.    We reviewed our goal to lower his hemoglobin A1c less than 7% and aim for fasting and preprandial glucose below 130 mg/deciliter.    I recommend the following:  -Increase Tresiba to 40-0-0-0  -Increase Trulicity to 3.0 mg weekly  -Continue Metformin 1000 mg BID  -Continue Jardiance 25 mg daily    We also reviewed dietary modifications in detail including limiting intake of simple sugars.    He prefers glucometer to CGM.  He will check a glucose twice daily to include fasting and the second time either before meals or at bedtime. If his glycemic control remains above goal he will let me know for intensification of therapy.  Adding prandial insulin will be our last resort.    Return to see me in 6 months with labs ordered.    30 minutes spent on the date of the encounter doing chart review, history and exam, documentation and further activities as noted above.       Again, thank you for allowing me to participate in the care of your patient.        Sincerely,        Nasir Young MD

## 2023-05-26 NOTE — PROGRESS NOTES
Subjective:    Established patient    Ced Fajardo is a 67 year old adult who presents to review DM.     Current DM therapy:  -Tresiba 35-0-0-0  -Trulicity 1.5 mg weekly; well tolerated  -Metformin 1000 mg BID; well tolerated  -Jardiance 25 mg daily; well tolerated     He uses a glucometer.    Walking 7-8 miles per day.     He has 3 meals per day, dinner is the biggest meal.  Snacks in the mid afternoon.    Objective:    Appears well today, BMI 25.79 kg/m2, /62, 73 kg. Diabetic foot exam performed and normal. No lipohypertrophy/lipodystrophy at his abdominal insulin injection sites.    6/2022: Thyroid examined and normal. No cervical LAD.     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 No underlying masses. No bile duct dilatation. No pancreatic duct dilatation.  -6/2022: C-peptide 1.8 ng/mL with concurrent glucose 154 mg/dL   -10/2022: HbA1c 7.5% (6/2022 was 7.2%)   -3/2023: HbA1c 7.3%  -5/2023: HbA1c 7.7%  -He has intranasal glucagon at home  # No diabetic retinopathy, most recent DM eye exam 12/2022  # Hypertension, on lisinopril   -11/2021: urine microalbumin 70.8 mg/g Cr  -10/2022: GFR >90, urine microalbumin undetectable  -5/2023: GFR >90  # No definite peripheral neuropathy, no prior DM foot ulceration  # No prior ASCVD event  # Dyslipidemia, on pravastatin 80 mg daily and ASA  -10/2022: , HDL 55, LDL 75, TG 79  # Hepatic steatosis    We reviewed his capillary glucose log today.  He checks a capillary glucose once daily fasting in the morning with a typical range  mg/deciliter.  No documented hypoglycemia and no symptoms suggestive of hypoglycemia.    We reviewed our goal to lower his hemoglobin A1c less than 7% and aim for fasting  and preprandial glucose below 130 mg/deciliter.    I recommend the following:  -Increase Tresiba to 40-0-0-0  -Increase Trulicity to 3.0 mg weekly  -Continue Metformin 1000 mg BID  -Continue Jardiance 25 mg daily    We also reviewed dietary modifications in detail including limiting intake of simple sugars.    He prefers glucometer to CGM.  He will check a glucose twice daily to include fasting and the second time either before meals or at bedtime. If his glycemic control remains above goal he will let me know for intensification of therapy.  Adding prandial insulin will be our last resort.    Return to see me in 6 months with labs ordered.    30 minutes spent on the date of the encounter doing chart review, history and exam, documentation and further activities as noted above.

## 2023-05-30 RX ORDER — GLUCAGON 3 MG/1
POWDER NASAL
Qty: 1 EACH | Refills: 3 | OUTPATIENT
Start: 2023-05-30

## 2023-05-30 NOTE — TELEPHONE ENCOUNTER
Requested Prescriptions   Pending Prescriptions Disp Refills     BAQSIMI ONE PACK 3 MG/DOSE POWD [Pharmacy Med Name: BAQSIMI 3 MG SPRAY ONE PACK] 1 each 3     Sig: SPRAY 1 SPRAY IN NOSTRIL DAILY AS NEEDED (SEVERE HYPOGLYCEMIA)       Glucagon Protocol Passed - 5/26/2023  4:29 PM        Passed - Medication is active on med list        Passed - Patient is 18 years of age or older

## 2023-08-17 DIAGNOSIS — Z79.4 TYPE 2 DIABETES MELLITUS WITH HYPERGLYCEMIA, WITH LONG-TERM CURRENT USE OF INSULIN (H): Primary | ICD-10-CM

## 2023-08-17 DIAGNOSIS — E11.65 TYPE 2 DIABETES MELLITUS WITH HYPERGLYCEMIA, WITH LONG-TERM CURRENT USE OF INSULIN (H): Primary | ICD-10-CM

## 2023-08-17 RX ORDER — INSULIN DEGLUDEC 100 U/ML
40 INJECTION, SOLUTION SUBCUTANEOUS DAILY
Qty: 30 ML | Refills: 0 | Status: SHIPPED | OUTPATIENT
Start: 2023-08-17 | End: 2023-10-30

## 2023-08-17 NOTE — TELEPHONE ENCOUNTER
"Requested Prescriptions   Pending Prescriptions Disp Refills    TRESIBA FLEXTOUCH 100 UNIT/ML pen [Pharmacy Med Name: TRESIBA FLEXTOUCH 100 UNIT/ML]  2     Sig: INJECT 45 UNITS SUBCUTANEOUSLY ONCE DAILY       Long Acting Insulin Protocol Passed - 8/17/2023  5:43 AM        Passed - Serum creatinine on file in past 12 months     Recent Labs   Lab Test 05/19/23  1043   CR 0.80       Ok to refill medication if creatinine is low          Passed - HgbA1C in past 3 or 6 months     If HgbA1C is 8 or greater, it needs to be on file within the past 3 months.  If less than 8, must be on file within the past 6 months.     Recent Labs   Lab Test 05/19/23  1043   A1C 7.7*             Passed - Medication is active on med list        Passed - Patient is age 18 or older        Passed - Recent (6 mo) or future (30 days) visit within the authorizing provider's specialty     Patient had office visit in the last 6 months or has a visit in the next 30 days with authorizing provider or within the authorizing provider's specialty.  See \"Patient Info\" tab in inbasket, or \"Choose Columns\" in Meds & Orders section of the refill encounter.                 "

## 2023-10-28 DIAGNOSIS — Z79.4 TYPE 2 DIABETES MELLITUS WITH HYPERGLYCEMIA, WITH LONG-TERM CURRENT USE OF INSULIN (H): ICD-10-CM

## 2023-10-28 DIAGNOSIS — E11.65 TYPE 2 DIABETES MELLITUS WITH HYPERGLYCEMIA, WITH LONG-TERM CURRENT USE OF INSULIN (H): ICD-10-CM

## 2023-10-30 RX ORDER — INSULIN DEGLUDEC 100 U/ML
INJECTION, SOLUTION SUBCUTANEOUS
Qty: 30 ML | Refills: 0 | Status: SHIPPED | OUTPATIENT
Start: 2023-10-30 | End: 2024-01-12

## 2023-10-30 NOTE — TELEPHONE ENCOUNTER
"Pt needs follow up first available, and to see a PA/NP in between appointment.         Requested Prescriptions   Pending Prescriptions Disp Refills    TRESIBA FLEXTOUCH 100 UNIT/ML pen [Pharmacy Med Name: TRESIBA FLEXTOUCH 100 UNIT/ML]       Sig: INJECT 40 UNITS UNDER SKIN DAILY       Long Acting Insulin Protocol Passed - 10/28/2023  9:03 AM        Passed - Serum creatinine on file in past 12 months     Recent Labs   Lab Test 05/19/23  1043   CR 0.80       Ok to refill medication if creatinine is low          Passed - HgbA1C in past 3 or 6 months     If HgbA1C is 8 or greater, it needs to be on file within the past 3 months.  If less than 8, must be on file within the past 6 months.     Recent Labs   Lab Test 05/19/23  1043   A1C 7.7*             Passed - Medication is active on med list        Passed - Patient is age 18 or older        Passed - Recent (6 mo) or future (30 days) visit within the authorizing provider's specialty     Patient had office visit in the last 6 months or has a visit in the next 30 days with authorizing provider or within the authorizing provider's specialty.  See \"Patient Info\" tab in inbasket, or \"Choose Columns\" in Meds & Orders section of the refill encounter.                 "

## 2023-10-30 NOTE — TELEPHONE ENCOUNTER
Patient calling to schedule follow up with Dr. Young and has declined to schedule with an NP, PA in the time in between and will be scheduling with his PCP for follow up. Please call to discuss if needed. Thank you.

## 2023-11-20 ENCOUNTER — TRANSFERRED RECORDS (OUTPATIENT)
Dept: HEALTH INFORMATION MANAGEMENT | Facility: CLINIC | Age: 68
End: 2023-11-20
Payer: MEDICARE

## 2023-11-20 LAB — RETINOPATHY: NEGATIVE

## 2023-11-30 PROBLEM — E78.5 HLD (HYPERLIPIDEMIA): Status: ACTIVE | Noted: 2020-12-15

## 2023-12-01 ENCOUNTER — OFFICE VISIT (OUTPATIENT)
Dept: FAMILY MEDICINE | Facility: CLINIC | Age: 68
End: 2023-12-01
Payer: MEDICARE

## 2023-12-01 VITALS
SYSTOLIC BLOOD PRESSURE: 132 MMHG | TEMPERATURE: 99 F | BODY MASS INDEX: 26.24 KG/M2 | DIASTOLIC BLOOD PRESSURE: 74 MMHG | RESPIRATION RATE: 16 BRPM | OXYGEN SATURATION: 99 % | HEART RATE: 89 BPM | WEIGHT: 163.3 LBS | HEIGHT: 66 IN

## 2023-12-01 DIAGNOSIS — E83.52 HYPERCALCEMIA: ICD-10-CM

## 2023-12-01 DIAGNOSIS — E78.5 DYSLIPIDEMIA: ICD-10-CM

## 2023-12-01 DIAGNOSIS — E11.65 TYPE 2 DIABETES MELLITUS WITH HYPERGLYCEMIA, WITH LONG-TERM CURRENT USE OF INSULIN (H): ICD-10-CM

## 2023-12-01 DIAGNOSIS — I10 ESSENTIAL HYPERTENSION: ICD-10-CM

## 2023-12-01 DIAGNOSIS — N52.9 ERECTILE DYSFUNCTION, UNSPECIFIED ERECTILE DYSFUNCTION TYPE: ICD-10-CM

## 2023-12-01 DIAGNOSIS — Z79.4 TYPE 2 DIABETES MELLITUS WITH HYPERGLYCEMIA, WITH LONG-TERM CURRENT USE OF INSULIN (H): ICD-10-CM

## 2023-12-01 LAB
CHOLEST SERPL-MCNC: 139 MG/DL
CREAT UR-MCNC: 76.3 MG/DL
FASTING STATUS PATIENT QL REPORTED: YES
GLUCOSE SERPL-MCNC: 115 MG/DL (ref 70–99)
HBA1C MFR BLD: 7.1 % (ref 0–5.6)
HDLC SERPL-MCNC: 56 MG/DL
LDLC SERPL CALC-MCNC: 68 MG/DL
MICROALBUMIN UR-MCNC: 12.9 MG/L
MICROALBUMIN/CREAT UR: 16.91 MG/G CR (ref 0–25)
NONHDLC SERPL-MCNC: 83 MG/DL
PTH-INTACT SERPL-MCNC: 33 PG/ML (ref 15–65)
TRIGL SERPL-MCNC: 76 MG/DL

## 2023-12-01 PROCEDURE — 82947 ASSAY GLUCOSE BLOOD QUANT: CPT | Performed by: NURSE PRACTITIONER

## 2023-12-01 PROCEDURE — 83970 ASSAY OF PARATHORMONE: CPT | Performed by: NURSE PRACTITIONER

## 2023-12-01 PROCEDURE — 82043 UR ALBUMIN QUANTITATIVE: CPT | Performed by: NURSE PRACTITIONER

## 2023-12-01 PROCEDURE — 36415 COLL VENOUS BLD VENIPUNCTURE: CPT | Performed by: NURSE PRACTITIONER

## 2023-12-01 PROCEDURE — 83036 HEMOGLOBIN GLYCOSYLATED A1C: CPT | Performed by: NURSE PRACTITIONER

## 2023-12-01 PROCEDURE — 99214 OFFICE O/P EST MOD 30 MIN: CPT | Performed by: NURSE PRACTITIONER

## 2023-12-01 PROCEDURE — 82570 ASSAY OF URINE CREATININE: CPT | Performed by: NURSE PRACTITIONER

## 2023-12-01 PROCEDURE — 80061 LIPID PANEL: CPT | Performed by: NURSE PRACTITIONER

## 2023-12-01 RX ORDER — PRAVASTATIN SODIUM 80 MG/1
80 TABLET ORAL DAILY
Qty: 90 TABLET | Refills: 3 | Status: SHIPPED | OUTPATIENT
Start: 2023-12-01

## 2023-12-01 RX ORDER — SILDENAFIL 50 MG/1
25-50 TABLET, FILM COATED ORAL DAILY PRN
Qty: 30 TABLET | Refills: 5 | Status: SHIPPED | OUTPATIENT
Start: 2023-12-01

## 2023-12-01 RX ORDER — LISINOPRIL 10 MG/1
10 TABLET ORAL DAILY
Qty: 90 TABLET | Refills: 3 | Status: SHIPPED | OUTPATIENT
Start: 2023-12-01

## 2023-12-01 ASSESSMENT — PAIN SCALES - GENERAL: PAINLEVEL: NO PAIN (0)

## 2023-12-01 NOTE — LETTER
December 1, 2023      Ced Fajardo  [address] [address] [place] [id number]        To Whom It May Concern:    Ced Fajardo is a patient in our clinic.  He is cognitively intact and is able to make decisions for himself.        Sincerely,        JUAN Ruiz CNP

## 2023-12-01 NOTE — PROGRESS NOTES
Assessment and Plan:     Essential hypertension  This is controlled with lisinopril.  - lisinopril (ZESTRIL) 10 MG tablet  Dispense: 90 tablet; Refill: 3    Dyslipidemia  This is controlled.  He continues pravastatin.  - pravastatin (PRAVACHOL) 80 MG tablet  Dispense: 90 tablet; Refill: 3    Erectile dysfunction, unspecified erectile dysfunction type  Patient continues sildenafil as needed.  - sildenafil (VIAGRA) 50 MG tablet  Dispense: 30 tablet; Refill: 5    Type 2 diabetes mellitus with hyperglycemia, with long-term current use of insulin (H)  Patient is followed by endocrinology.  - Hemoglobin A1c  - Glucose  - Lipid Profile  - Albumin Random Urine Quantitative with Creat Ratio    Hypercalcemia  Patient is followed by endocrinology.  - Parathyroid Hormone Intact    Letter provided for patient's will that patient is cognitively intact.    Subjective:     Ced is a 67 year old adult presenting to the clinic for medication management.  Patient has type 2 diabetes which is managed by endocrinology.  He is injecting Trulicity, Tresiba and taking metformin and Jardiance.  He checks his blood sugars daily.  He is try to consume healthy diet.  He walks 7-8 miles per day.  Patient has hyperlipidemia which is managed with pravastatin 80 mg daily.  Last cholesterol check was on 10/28/2022 with a total cholesterol 146, triglycerides 79, HDL 55, LDL 75.  He is taking lisinopril 10 mg daily for renal protection and blood pressure control.  He take sildenafil as needed for male erectile dysfunction.  Patient requests a letter for is well stating that he is of sound mind.    Reviewof Systems: A complete 14 point review of systems was obtained and is negative or as stated in the history of present illness.    Social History     Socioeconomic History    Marital status:      Spouse name: Not on file    Number of children: Not on file    Years of education: Not on file    Highest education level: Not on file    Occupational History    Not on file   Tobacco Use    Smoking status: Never     Passive exposure: Never    Smokeless tobacco: Never   Vaping Use    Vaping Use: Never used   Substance and Sexual Activity    Alcohol use: Yes     Comment: rare    Drug use: No    Sexual activity: Yes     Partners: Female     Comment:    Other Topics Concern    Not on file   Social History Narrative    Not on file     Social Determinants of Health     Financial Resource Strain: Low Risk  (11/28/2023)    Financial Resource Strain     Within the past 12 months, have you or your family members you live with been unable to get utilities (heat, electricity) when it was really needed?: No   Food Insecurity: Low Risk  (11/28/2023)    Food Insecurity     Within the past 12 months, did you worry that your food would run out before you got money to buy more?: No     Within the past 12 months, did the food you bought just not last and you didn t have money to get more?: No   Transportation Needs: Low Risk  (11/28/2023)    Transportation Needs     Within the past 12 months, has lack of transportation kept you from medical appointments, getting your medicines, non-medical meetings or appointments, work, or from getting things that you need?: No   Physical Activity: Not on file   Stress: Not on file   Social Connections: Not on file   Interpersonal Safety: Low Risk  (12/1/2023)    Interpersonal Safety     Do you feel physically and emotionally safe where you currently live?: Yes     Within the past 12 months, have you been hit, slapped, kicked or otherwise physically hurt by someone?: No     Within the past 12 months, have you been humiliated or emotionally abused in other ways by your partner or ex-partner?: No   Housing Stability: Low Risk  (11/28/2023)    Housing Stability     Do you have housing? : Yes     Are you worried about losing your housing?: No       Active Ambulatory Problems     Diagnosis Date Noted    Microalbuminuria     Type 2  "diabetes mellitus with hyperglycemia, without long-term current use of insulin (H)     Dyslipidemia 08/26/2016    HLD (hyperlipidemia) 12/15/2020     Resolved Ambulatory Problems     Diagnosis Date Noted    Hypertension      Past Medical History:   Diagnosis Date    Diabetes mellitus (H)        Family History   Problem Relation Age of Onset    Breast Cancer Mother     Diabetes Father        Objective:     /74   Pulse 89   Temp 99  F (37.2  C)   Resp 16   Ht 1.676 m (5' 6\")   Wt 74.1 kg (163 lb 4.8 oz)   SpO2 99%   Breastfeeding No   BMI 26.36 kg/m      Patient is alert, in no obvious distress.   Skin: Warm, dry.    Lungs:  Clear to auscultation. Respirations even and unlabored.  No wheezing or rales noted.   Heart:  Regular rate and rhythm.  No murmurs, S3, S4, gallops, or rubs.    Musculoskeletal:  Full ROM of extremities.  No edema is present bilateral lower extremities.  Monofilament testing is normal bilaterally.        Answers submitted by the patient for this visit:  Diabetes Visit (Submitted on 11/28/2023)  Chief Complaint: Chronic problems general questions HPI Form  Frequency of checking blood sugars:: one time daily  What time of day are you checking your blood sugars : before meals  Have you had any blood sugars above 200?: No  Have you had any blood sugars below 70?: No  Hypoglycemia symptoms:: shakiness, dizziness  Diabetic concerns:: none  Paraesthesia present:: none of these symptoms  Have you had a diabetic eye exam within the last year?: Yes  Date of last eye exam: : 11-  Where was this eye exam done?: [facility]  General Questionnaire (Submitted on 11/28/2023)  Chief Complaint: Chronic problems general questions HPI Form  On average, how many sweetened beverages do you drink each day (Examples: soda, juice, sweet tea, etc.  Do NOT count diet or artificially sweetened beverages)?: 0  How many minutes a day do you exercise enough to make your heart beat faster?: 60 or " more  How many days a week do you exercise enough to make your heart beat faster?: 5  How many days per week do you miss taking your medication?: 0

## 2024-01-12 DIAGNOSIS — E11.65 TYPE 2 DIABETES MELLITUS WITH HYPERGLYCEMIA, WITH LONG-TERM CURRENT USE OF INSULIN (H): ICD-10-CM

## 2024-01-12 DIAGNOSIS — Z79.4 TYPE 2 DIABETES MELLITUS WITH HYPERGLYCEMIA, WITH LONG-TERM CURRENT USE OF INSULIN (H): ICD-10-CM

## 2024-01-12 RX ORDER — INSULIN DEGLUDEC 100 U/ML
INJECTION, SOLUTION SUBCUTANEOUS
Qty: 30 ML | Refills: 0 | Status: SHIPPED | OUTPATIENT
Start: 2024-01-12 | End: 2024-05-01

## 2024-01-12 NOTE — TELEPHONE ENCOUNTER
"    Requested Prescriptions   Pending Prescriptions Disp Refills    TRESIBA FLEXTOUCH 100 UNIT/ML pen [Pharmacy Med Name: TRESIBA FLEXTOUCH 100 UNIT/ML]       Sig: INJECT 40 UNITS UNDER SKIN DAILY       Long Acting Insulin Protocol Failed - 1/12/2024  2:24 PM        Failed - Recent (6 mo) or future (30 days) visit within the authorizing provider's specialty     Patient had office visit in the last 6 months or has a visit in the next 30 days with authorizing provider or within the authorizing provider's specialty.  See \"Patient Info\" tab in inbasket, or \"Choose Columns\" in Meds & Orders section of the refill encounter.            Passed - Serum creatinine on file in past 12 months     Recent Labs   Lab Test 05/19/23  1043   CR 0.80       Ok to refill medication if creatinine is low          Passed - HgbA1C in past 3 or 6 months     If HgbA1C is 8 or greater, it needs to be on file within the past 3 months.  If less than 8, must be on file within the past 6 months.     Recent Labs   Lab Test 12/01/23  1059   A1C 7.1*             Passed - Medication is active on med list        Passed - Patient is age 18 or older             "

## 2024-02-29 ENCOUNTER — PATIENT OUTREACH (OUTPATIENT)
Dept: CARE COORDINATION | Facility: CLINIC | Age: 69
End: 2024-02-29
Payer: MEDICARE

## 2024-03-09 DIAGNOSIS — Z79.4 TYPE 2 DIABETES MELLITUS WITH HYPERGLYCEMIA, WITH LONG-TERM CURRENT USE OF INSULIN (H): ICD-10-CM

## 2024-03-09 DIAGNOSIS — E11.65 TYPE 2 DIABETES MELLITUS WITH HYPERGLYCEMIA, WITH LONG-TERM CURRENT USE OF INSULIN (H): ICD-10-CM

## 2024-03-11 RX ORDER — DULAGLUTIDE 3 MG/.5ML
3 INJECTION, SOLUTION SUBCUTANEOUS
Qty: 2 ML | Refills: 2 | Status: SHIPPED | OUTPATIENT
Start: 2024-03-11 | End: 2024-08-26

## 2024-03-11 NOTE — TELEPHONE ENCOUNTER
Requested Prescriptions   Pending Prescriptions Disp Refills    TRULICITY 3 MG/0.5ML SOPN [Pharmacy Med Name: TRULICITY 3 MG/0.5 ML PEN]       Sig: INJECT 3 MG SUBCUTANEOUS EVERY 7 DAYS       GLP-1 Agonists Protocol Failed - 3/9/2024  4:18 PM        Failed - Recent (6 mo) or future (90 days) visit within the authorizing provider's specialty     The patient must have completed an in-person or virtual visit within the past 6 months or has a future visit scheduled within the next 90 days with the authorizing provider s specialty.  Urgent care and e-visits do not quality as an office visit for this protocol.          Passed - HgbA1C in past 3 or 6 months     If HgbA1C is 8 or greater, it needs to be on file within the past 3 months.  If less than 8, must be on file within the past 6 months.     Recent Labs   Lab Test 12/01/23  1059   A1C 7.1*             Passed - Medication is active on med list        Passed - Has GFR on file in past 12 months and most recent value is normal        Passed - Medication indicated for associated diagnosis     Medication is associated with one or more of the following diagnoses:     Type 2 diabetes mellitus           Passed - Patient is age 18 or older

## 2024-03-28 DIAGNOSIS — E11.65 TYPE 2 DIABETES MELLITUS WITH HYPERGLYCEMIA, WITHOUT LONG-TERM CURRENT USE OF INSULIN (H): ICD-10-CM

## 2024-05-01 DIAGNOSIS — E11.65 TYPE 2 DIABETES MELLITUS WITH HYPERGLYCEMIA, WITHOUT LONG-TERM CURRENT USE OF INSULIN (H): ICD-10-CM

## 2024-05-01 DIAGNOSIS — E11.65 TYPE 2 DIABETES MELLITUS WITH HYPERGLYCEMIA, WITH LONG-TERM CURRENT USE OF INSULIN (H): ICD-10-CM

## 2024-05-01 DIAGNOSIS — Z79.4 TYPE 2 DIABETES MELLITUS WITH HYPERGLYCEMIA, WITH LONG-TERM CURRENT USE OF INSULIN (H): ICD-10-CM

## 2024-05-01 RX ORDER — FLURBIPROFEN SODIUM 0.3 MG/ML
SOLUTION/ DROPS OPHTHALMIC
Qty: 100 EACH | Refills: 0 | Status: SHIPPED | OUTPATIENT
Start: 2024-05-01

## 2024-05-01 RX ORDER — INSULIN DEGLUDEC 100 U/ML
INJECTION, SOLUTION SUBCUTANEOUS
Qty: 30 ML | Refills: 0 | Status: SHIPPED | OUTPATIENT
Start: 2024-05-01 | End: 2024-07-08

## 2024-05-01 NOTE — TELEPHONE ENCOUNTER
Requested Prescriptions   Pending Prescriptions Disp Refills    TRESIBA FLEXTOUCH 100 UNIT/ML pen [Pharmacy Med Name: TRESIBA FLEXTOUCH 100 UNIT/ML]       Sig: INJECT 40 UNITS UNDER SKIN DAILY       Insulin Protocol Failed - 5/1/2024  6:15 AM        Failed - Chart Review Required     Review Chart.    Do not approve if insulin is used in a pump.  Instead, direct refill request to the patient's endocrinologist.  If the patient doesn't have an endocrinologist, then send the refill to the patient's PCP for review            Passed - Medication is active on med list        Passed - Has GFR on file in past 12 months and most recent value is normal        Passed - Recent (6 mo) or future (90 days) visit within the authorizing provider's specialty     The patient must have completed an in-person or virtual visit within the past 6 months or has a future visit scheduled within the next 90 days with the authorizing provider s specialty.  Urgent care and e-visits do not quality as an office visit for this protocol.          Passed - Medication indicated for associated diagnosis     Medication is associated with one or more of the following diagnoses:   - Type 1 diabetes mellitus  - Type 2 diabetes mellitus  - Diabetic nephropathy; Prophylaxis  - Neuropathy due to diabetes mellitus; Prophylaxis  - Retinopathy due to diabetes mellitus; Prophylaxis  - Diabetes mellitus associated with cystic fibrosis  - Disorder of cardiovascular system; Prophylaxis - Type 1 diabetes mellitus   - Disorder of cardiovascular system; Prophylaxis - Type 2 diabetes mellitus            Passed - Patient is 18 years of age or older

## 2024-05-05 ENCOUNTER — HEALTH MAINTENANCE LETTER (OUTPATIENT)
Age: 69
End: 2024-05-05

## 2024-05-13 DIAGNOSIS — Z79.4 TYPE 2 DIABETES MELLITUS WITH HYPERGLYCEMIA, WITH LONG-TERM CURRENT USE OF INSULIN (H): ICD-10-CM

## 2024-05-13 DIAGNOSIS — E11.65 TYPE 2 DIABETES MELLITUS WITH HYPERGLYCEMIA, WITH LONG-TERM CURRENT USE OF INSULIN (H): ICD-10-CM

## 2024-05-14 RX ORDER — EMPAGLIFLOZIN 25 MG/1
25 TABLET, FILM COATED ORAL DAILY
Qty: 90 TABLET | Refills: 4 | Status: SHIPPED | OUTPATIENT
Start: 2024-05-14

## 2024-05-17 ENCOUNTER — TRANSFERRED RECORDS (OUTPATIENT)
Dept: HEALTH INFORMATION MANAGEMENT | Facility: CLINIC | Age: 69
End: 2024-05-17
Payer: MEDICARE

## 2024-06-10 NOTE — PROGRESS NOTES
Subjective:    Established patient    Ced Fajardo is a 68 year old adult who presents to review DM.     Current DM therapy:  -Tresiba 40-0-0-0  -Trulicity 3.0 mg weekly; well tolerated  -Metformin 1000 mg BID; well tolerated  -Jardiance 25 mg daily; well tolerated     He uses a glucometer.    Walking 7-8 miles per day.     He has 3 meals per day, dinner is the biggest meal.  Snacks in the mid afternoon.    Objective:    BMI 26.6 kg/m2, /64. Appears well.     5/2023: BMI 25.79 kg/m2, /62, 73 kg. Diabetic foot exam performed and normal. No lipohypertrophy/lipodystrophy at his abdominal insulin injection sites.    6/2022: Thyroid examined and normal. No cervical LAD.     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 No underlying masses. No bile duct dilatation. No pancreatic duct dilatation.  -6/2022: C-peptide 1.8 ng/mL with concurrent glucose 154 mg/dL   -10/2022: HbA1c 7.5% (6/2022 was 7.2%)   -3/2023: HbA1c 7.3%  -5/2023: HbA1c 7.7%  -12/1/2023: HbA1c 7.1%  # No diabetic retinopathy, most recent DM eye exam 11/2023  # Hypertension, on lisinopril   -12/2023: urine microalbumin undetectable  -5/2023: GFR >90  # No definite peripheral neuropathy, no prior DM foot ulceration  # No prior ASCVD event  # Dyslipidemia, on pravastatin 80 mg daily and ASA  -12/2023: , HDL 56, LDL 68, TG 76  # Hepatic steatosis    We reviewed his capillary glucose log today.  He checks a capillary glucose once daily fasting in the morning with a typical range 100-180 mg/deciliter. Rare hypoglycemia, possible hypoglycemia unawareness.     He prefers glucometer to CGM.      HbA1c, Cr, ordered for today. [facility] referral placed for a diagnostic CGM. Next steps when I see  the results of his diagnostic CGM.     20 minutes spent on the date of the encounter doing chart review, history and exam, documentation and further activities as noted above.     The longitudinal plan of care for the diagnosis(es)/condition(s) as documented were addressed during this visit. Due to the added complexity in care, I will continue to support beverley in the subsequent management and with ongoing continuity of care.

## 2024-06-11 ENCOUNTER — OFFICE VISIT (OUTPATIENT)
Dept: ENDOCRINOLOGY | Facility: CLINIC | Age: 69
End: 2024-06-11
Payer: MEDICARE

## 2024-06-11 ENCOUNTER — LAB (OUTPATIENT)
Dept: LAB | Facility: CLINIC | Age: 69
End: 2024-06-11
Payer: MEDICARE

## 2024-06-11 VITALS
DIASTOLIC BLOOD PRESSURE: 64 MMHG | SYSTOLIC BLOOD PRESSURE: 120 MMHG | BODY MASS INDEX: 26.63 KG/M2 | WEIGHT: 165 LBS | HEART RATE: 80 BPM

## 2024-06-11 DIAGNOSIS — E78.5 DYSLIPIDEMIA: ICD-10-CM

## 2024-06-11 DIAGNOSIS — Z79.4 LONG TERM (CURRENT) USE OF INSULIN (H): ICD-10-CM

## 2024-06-11 DIAGNOSIS — Z79.4 TYPE 2 DIABETES MELLITUS WITH HYPERGLYCEMIA, WITH LONG-TERM CURRENT USE OF INSULIN (H): Primary | ICD-10-CM

## 2024-06-11 DIAGNOSIS — E11.65 TYPE 2 DIABETES MELLITUS WITH HYPERGLYCEMIA, WITH LONG-TERM CURRENT USE OF INSULIN (H): Primary | ICD-10-CM

## 2024-06-11 DIAGNOSIS — Z79.4 TYPE 2 DIABETES MELLITUS WITH HYPERGLYCEMIA, WITH LONG-TERM CURRENT USE OF INSULIN (H): ICD-10-CM

## 2024-06-11 DIAGNOSIS — I10 HYPERTENSION, UNSPECIFIED TYPE: ICD-10-CM

## 2024-06-11 DIAGNOSIS — E11.65 TYPE 2 DIABETES MELLITUS WITH HYPERGLYCEMIA, WITH LONG-TERM CURRENT USE OF INSULIN (H): ICD-10-CM

## 2024-06-11 DIAGNOSIS — K76.0 HEPATIC STEATOSIS: ICD-10-CM

## 2024-06-11 LAB
CREAT SERPL-MCNC: 0.85 MG/DL (ref 0.51–1.17)
EGFRCR SERPLBLD CKD-EPI 2021: >90 ML/MIN/1.73M2
HBA1C MFR BLD: 7.7 % (ref 0–5.6)

## 2024-06-11 PROCEDURE — 82565 ASSAY OF CREATININE: CPT

## 2024-06-11 PROCEDURE — 99213 OFFICE O/P EST LOW 20 MIN: CPT | Performed by: INTERNAL MEDICINE

## 2024-06-11 PROCEDURE — G2211 COMPLEX E/M VISIT ADD ON: HCPCS | Performed by: INTERNAL MEDICINE

## 2024-06-11 PROCEDURE — 83036 HEMOGLOBIN GLYCOSYLATED A1C: CPT

## 2024-06-11 PROCEDURE — 36415 COLL VENOUS BLD VENIPUNCTURE: CPT

## 2024-06-11 NOTE — LETTER
6/11/2024      Ced Fajardo  [address] [address] [place] [id number]      Dear Colleague,    Thank you for referring your patient, Ced Fajardo, to the [facility]. Please see a copy of my visit note below.    Subjective:    Established patient    Ced Fajardo is a 68 year old adult who presents to review DM.     Current DM therapy:  -Tresiba 40-0-0-0  -Trulicity 3.0 mg weekly; well tolerated  -Metformin 1000 mg BID; well tolerated  -Jardiance 25 mg daily; well tolerated     He uses a glucometer.    Walking 7-8 miles per day.     He has 3 meals per day, dinner is the biggest meal.  Snacks in the mid afternoon.    Objective:    BMI 26.6 kg/m2, /64. Appears well.     5/2023: BMI 25.79 kg/m2, /62, 73 kg. Diabetic foot exam performed and normal. No lipohypertrophy/lipodystrophy at his abdominal insulin injection sites.    6/2022: Thyroid examined and normal. No cervical LAD.     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 No underlying masses. No bile duct dilatation. No pancreatic duct dilatation.  -6/2022: C-peptide 1.8 ng/mL with concurrent glucose 154 mg/dL   -10/2022: HbA1c 7.5% (6/2022 was 7.2%)   -3/2023: HbA1c 7.3%  -5/2023: HbA1c 7.7%  -12/1/2023: HbA1c 7.1%  # No diabetic retinopathy, most recent DM eye exam 11/2023  # Hypertension, on lisinopril   -12/2023: urine microalbumin undetectable  -5/2023: GFR >90  # No definite peripheral neuropathy, no prior DM foot ulceration  # No prior ASCVD event  # Dyslipidemia, on pravastatin 80 mg daily and ASA  -12/2023: , HDL 56, LDL 68, TG 76  # Hepatic steatosis    We reviewed his capillary glucose log today.  He checks a capillary glucose once daily fasting  in the morning with a typical range 100-180 mg/deciliter. Rare hypoglycemia, possible hypoglycemia unawareness.     He prefers glucometer to CGM.      HbA1c, Cr, ordered for today. [facility] referral placed for a diagnostic CGM. Next steps when I see the results of his diagnostic CGM.     20 minutes spent on the date of the encounter doing chart review, history and exam, documentation and further activities as noted above.     The longitudinal plan of care for the diagnosis(es)/condition(s) as documented were addressed during this visit. Due to the added complexity in care, I will continue to support beverley in the subsequent management and with ongoing continuity of care.      Again, thank you for allowing me to participate in the care of your patient.        Sincerely,        Nasir Young MD

## 2024-06-28 ENCOUNTER — TELEPHONE (OUTPATIENT)
Dept: EDUCATION SERVICES | Facility: CLINIC | Age: 69
End: 2024-06-28
Payer: MEDICARE

## 2024-06-28 NOTE — TELEPHONE ENCOUNTER
Lv Pro was dropped off.    Patient does not have an appointment to review this data with a diabetes educator. It was scheduled and then cancelled with no other appts scheduled.    Sent a mychart to patient to get scheduled.    Dr. Young - do you want to review and bill for the CGM report since we don't have an appt with him scheduled. I am not sure who placed the Lv Pro for him      Billing code is 76748 for physician interpretation. 83163 is the billing code for the download and Lv Pro.                  Devika Sandhu RD, LD, [facility]  Certified Diabetes Care &   Outpatient [facility]

## 2024-07-01 ENCOUNTER — DOCUMENTATION ONLY (OUTPATIENT)
Dept: ENDOCRINOLOGY | Facility: CLINIC | Age: 69
End: 2024-07-01
Payer: MEDICARE

## 2024-07-01 DIAGNOSIS — Z79.4 TYPE 2 DIABETES MELLITUS WITH HYPERGLYCEMIA, WITH LONG-TERM CURRENT USE OF INSULIN (H): Primary | ICD-10-CM

## 2024-07-01 DIAGNOSIS — Z79.4 TYPE 2 DIABETES MELLITUS WITH HYPERGLYCEMIA, WITH LONG-TERM CURRENT USE OF INSULIN (H): ICD-10-CM

## 2024-07-01 DIAGNOSIS — E11.65 TYPE 2 DIABETES MELLITUS WITH HYPERGLYCEMIA, WITH LONG-TERM CURRENT USE OF INSULIN (H): Primary | ICD-10-CM

## 2024-07-01 DIAGNOSIS — E11.65 TYPE 2 DIABETES MELLITUS WITH HYPERGLYCEMIA, WITH LONG-TERM CURRENT USE OF INSULIN (H): ICD-10-CM

## 2024-07-01 RX ORDER — BLOOD-GLUCOSE SENSOR
1 EACH MISCELLANEOUS
Qty: 2 EACH | Refills: 11 | Status: SHIPPED | OUTPATIENT
Start: 2024-07-01

## 2024-07-01 NOTE — PROGRESS NOTES
I reviewed Efrain's diagnostic CGM. Report in Devika Sandhu's note from 6/28/2024.    FS Lv Pro worn from 6/11/24 - 6/25/24. GMI 7.1%, TIR 60%, 5% low, 24% high, 11% very high.    He has down-trending glucose overnight and hypoglycemia generally occurs overnight. He has significant post-prandial hyperglycemia.    Based on his CGM results I recommend:    -Reduce Tresiba from 40 units to 32 units once daily   -Trulicity 3.0 mg weekly; continue   -Metformin 1000 mg BID; continue   -Jardiance 25 mg daily; continue    Start prandial insulin. I prescribed aspart to start 5 units before all meals. Give 10-15 minutes before each meal. We will increase the dose and are intentionally starting at a lower dose.    Because he'll be on multiple insulin shots each day, a CGM would really benefit Efrain. I prescribed a FS Lv 3 CGM.     Given his insulin plan, if he wishes to continue a blood glucose monitor then it's ideal to check a glucose fasting, before meals, and at bedtime.     I recommend a [facility] appointment ASAP to review the above.    I'll see him back at my next [facility] spot.    Thanks, Lan

## 2024-07-01 NOTE — TELEPHONE ENCOUNTER
Spoke to pt and informed of provider recommendations. Pt verbalized understanding. Pt will   prescriptions and pt will schedule with CDE/

## 2024-07-02 NOTE — TELEPHONE ENCOUNTER
Requested Prescriptions   Pending Prescriptions Disp Refills    Insulin Aspart FlexPen 100 UNIT/ML SOPN [Pharmacy Med Name: INSULIN ASPART 100 UNIT/ML PEN]  3     Sig: INJECT 5 UNITS SUBCUTANEOUS 3 TIMES DAILY (WITH MEALS)       Insulin Protocol Failed - 7/1/2024  9:30 AM        Failed - Chart Review Required     Review Chart.    Do not approve if insulin is used in a pump.  Instead, direct refill request to the patient's endocrinologist.  If the patient doesn't have an endocrinologist, then send the refill to the patient's PCP for review            Passed - Medication is active on med list        Passed - Has GFR on file in past 12 months and most recent value is normal        Passed - Recent (6 mo) or future (90 days) visit within the authorizing provider's specialty     The patient must have completed an in-person or virtual visit within the past 6 months or has a future visit scheduled within the next 90 days with the authorizing provider s specialty.  Urgent care and e-visits do not quality as an office visit for this protocol.          Passed - Medication indicated for associated diagnosis     Medication is associated with one or more of the following diagnoses:   - Type 1 diabetes mellitus  - Type 2 diabetes mellitus  - Diabetic nephropathy; Prophylaxis  - Neuropathy due to diabetes mellitus; Prophylaxis  - Retinopathy due to diabetes mellitus; Prophylaxis  - Diabetes mellitus associated with cystic fibrosis  - Disorder of cardiovascular system; Prophylaxis - Type 1 diabetes mellitus   - Disorder of cardiovascular system; Prophylaxis - Type 2 diabetes mellitus            Passed - Patient is 18 years of age or older

## 2024-07-03 NOTE — TELEPHONE ENCOUNTER
Central Prior Authorization Team   Phone: [phone]    PA Initiation    Medication: insulin aspart (NOVOLOG PEN) 100 UNIT/ML pen  Insurance Company: [employer] - Phone [phone] Fax [phone]  Pharmacy Filling the Rx: [id number] [place] [facility] [address] [place] [address]  Filling Pharmacy Phone: [phone]  Filling Pharmacy Fax:    Start Date: 7/3/2024

## 2024-07-08 DIAGNOSIS — Z79.4 TYPE 2 DIABETES MELLITUS WITH HYPERGLYCEMIA, WITH LONG-TERM CURRENT USE OF INSULIN (H): ICD-10-CM

## 2024-07-08 DIAGNOSIS — E11.65 TYPE 2 DIABETES MELLITUS WITH HYPERGLYCEMIA, WITH LONG-TERM CURRENT USE OF INSULIN (H): ICD-10-CM

## 2024-07-08 RX ORDER — INSULIN DEGLUDEC 100 U/ML
32 INJECTION, SOLUTION SUBCUTANEOUS DAILY
Qty: 30 ML | Refills: 0 | Status: SHIPPED | OUTPATIENT
Start: 2024-07-08

## 2024-07-08 NOTE — TELEPHONE ENCOUNTER
Requested Prescriptions   Pending Prescriptions Disp Refills    TRESIBA FLEXTOUCH 100 UNIT/ML pen [Pharmacy Med Name: TRESIBA FLEXTOUCH 100 UNIT/ML]       Sig: INJECT 40 UNITS UNDER SKIN DAILY       Insulin Protocol Failed - 7/8/2024 12:48 AM        Failed - Chart Review Required     Review Chart.    Do not approve if insulin is used in a pump.  Instead, direct refill request to the patient's endocrinologist.  If the patient doesn't have an endocrinologist, then send the refill to the patient's PCP for review            Passed - Medication is active on med list        Passed - Has GFR on file in past 12 months and most recent value is normal        Passed - Recent (6 mo) or future (90 days) visit within the authorizing provider's specialty     The patient must have completed an in-person or virtual visit within the past 6 months or has a future visit scheduled within the next 90 days with the authorizing provider s specialty.  Urgent care and e-visits do not quality as an office visit for this protocol.          Passed - Medication indicated for associated diagnosis     Medication is associated with one or more of the following diagnoses:   - Type 1 diabetes mellitus  - Type 2 diabetes mellitus  - Diabetic nephropathy; Prophylaxis  - Neuropathy due to diabetes mellitus; Prophylaxis  - Retinopathy due to diabetes mellitus; Prophylaxis  - Diabetes mellitus associated with cystic fibrosis  - Disorder of cardiovascular system; Prophylaxis - Type 1 diabetes mellitus   - Disorder of cardiovascular system; Prophylaxis - Type 2 diabetes mellitus            Passed - Patient is 18 years of age or older

## 2024-07-09 NOTE — TELEPHONE ENCOUNTER
PRIOR AUTHORIZATION DENIED    Medication: insulin aspart (NOVOLOG PEN) 100 UNIT/ML pen-PA DENIED     Denial Date: 7/3/2024    Denial Rational:           Appeal Information:

## 2024-07-11 RX ORDER — INSULIN ASPART INJECTION 100 [IU]/ML
5 INJECTION, SOLUTION SUBCUTANEOUS
Qty: 15 ML | Refills: 0 | Status: SHIPPED | OUTPATIENT
Start: 2024-07-11 | End: 2024-07-11

## 2024-07-11 RX ORDER — INSULIN ASPART INJECTION 100 [IU]/ML
5 INJECTION, SOLUTION SUBCUTANEOUS
Qty: 15 ML | Refills: 0 | Status: SHIPPED | OUTPATIENT
Start: 2024-07-11

## 2024-08-06 ENCOUNTER — ALLIED HEALTH/NURSE VISIT (OUTPATIENT)
Dept: EDUCATION SERVICES | Facility: CLINIC | Age: 69
End: 2024-08-06
Attending: INTERNAL MEDICINE
Payer: MEDICARE

## 2024-08-06 DIAGNOSIS — Z79.4 TYPE 2 DIABETES MELLITUS WITH HYPERGLYCEMIA, WITH LONG-TERM CURRENT USE OF INSULIN (H): ICD-10-CM

## 2024-08-06 DIAGNOSIS — E11.65 TYPE 2 DIABETES MELLITUS WITH HYPERGLYCEMIA, WITH LONG-TERM CURRENT USE OF INSULIN (H): ICD-10-CM

## 2024-08-06 PROCEDURE — 95250 CONT GLUC MNTR PHYS/QHP EQP: CPT

## 2024-08-06 PROCEDURE — G0108 DIAB MANAGE TRN  PER INDIV: HCPCS

## 2024-08-06 NOTE — PROGRESS NOTES
Diabetes Self-Management Education & Support    Presents for:      Type of Service: In Person Visit      REPORTS:            Pt verbalized understanding of concepts discussed and recommendations provided today.       Continue education with the following diabetes management concepts: Healthy Eating and Monitoring    ASSESSMENT:  Met with Efrain to discuss CGM and insulin today.    He is currently taking Tresiba 40 units in the morning, Trulicity 3mg on Sundays, Metformin 1000mg after having his morning cereal and after dinner, and Jardiance 25mg after cereal.    His meals are pretty regular.  He wakes about 4am and eats breakfast between 4-5am.  He usually has cereal.  Previously he was eating Cheerios or Raisin Bran.  He recently switched to keto cereal.  He takes his Tresiba and medications after he has breakfast.  Lunch is about 11am and dinner is between 4-5pm.    We reviewed his higher readings and compared to his food log.  We reviewed the effect his cereal choices are having on his glucose in the morning. On Friday June 21st he had pizza for dinner and was surprised his readings did not get higher.  Stated they have [employer] sandwiches once weekly.  According to his food log this was on Wednesday June 19th and Monday June 24th.  Reviewed how these choices effected his glucose.  He is also having trouble with lows during his early morning hours.  Stated he does not feel these or wake up.  Because of this we discussed decreasing his Tresiba to 32 units and see how is glucose responds.  He is okay with trying this for about a month.     Glucose Patterns & Trends:  Hyperglycemia, weekend- postmeal and weekday- postmeal    PLAN    Decrease Tresiba to 32 units.  Will work on coverage for personal CGM.  Topics to cover at upcoming visits: Monitoring, Taking Medication, and Reducing Risks    Follow-up: Please schedule for 4-6 weeks.  Nothing scheduled at this time.    See Care Plan for co-developed, patient-state  "behavior change goals.  AVS provided for patient today.    Education Materials Provided:  No new materials provided today      SUBJECTIVE/OBJECTIVE:  Diabetes education in the past 24mo: No  Diabetes type: Type 1  Disease course: Worsening  Cultural Influences/Ethnic Background:  Not  or       Diabetes Symptoms & Complications:  Diabetes Related Symptoms: None  Weight trend: Stable  Symptom course: Stable  Disease course: Worsening       Patient Problem List and Family Medical History reviewed for relevant medical history, current medical status, and diabetes risk factors.    Vitals:  There were no vitals taken for this visit.  Estimated body mass index is 26.63 kg/m  as calculated from the following:    Height as of 12/1/23: 1.676 m (5' 6\").    Weight as of 6/11/24: 74.8 kg (165 lb).   Last 3 BP:   BP Readings from Last 3 Encounters:   06/11/24 120/64   12/01/23 132/74   05/26/23 120/62       History   Smoking Status    Never   Smokeless Tobacco    Never       Labs:  Lab Results   Component Value Date    A1C 7.7 06/11/2024     Lab Results   Component Value Date     12/01/2023     06/21/2022     Lab Results   Component Value Date    LDL 68 12/01/2023     11/04/2021     Direct Measure HDL   Date Value Ref Range Status   12/01/2023 56 >=40 mg/dL Final   ]  GFR Estimate   Date Value Ref Range Status   06/11/2024 >90 >60 mL/min/1.73m2 Final     Comment:     The generation of the estimated GFR is currently based on binary male or female sex. If the electronic health record information indicates another gender identity or if Legal Sex is recorded as \"Unknown\", GFR estimates are not automatically calculated, and application of GFR equations or a direct GFR measurement should be considered according to the individual's appropriate clinical context.   10/30/2020 >60 >60 mL/min/1.73m2 Final     GFR Estimate If Black   Date Value Ref Range Status   10/30/2020 >60 >60 mL/min/1.73m2 Final " "    Lab Results   Component Value Date    CR 0.85 06/11/2024     No results found for: \"MICROALBUMIN\"    Healthy Eating:  Cultural/[place] diet restrictions?: No    Being Active:  Barrier to exercise: None    Monitoring:  Blood Glucose Meter: Unknown  Times checking blood sugar at home (number): 1  Times checking blood sugar at home (per): Day        Taking Medications:  Diabetes Medication(s)       Biguanides       metFORMIN (GLUCOPHAGE) 1000 MG tablet TAKE 1 TABLET(1000 MG) BY MOUTH EVERY 12 HOURS       Diabetic Other       Glucagon (BAQSIMI ONE PACK) 3 MG/DOSE POWD Spray 1 spray (3 mg) in nostril daily as needed (severe hypoglycemia)       Insulin       insulin aspart (FIASP FLEXTOUCH) 100 UNIT/ML pen-injector Inject 5 Units subcutaneously 3 times daily (with meals)     insulin degludec (TRESIBA FLEXTOUCH) 100 UNIT/ML pen Inject 32 Units Subcutaneous daily     Insulin Degludec 100 UNIT/ML SOLN Inject 45 Units Subcutaneous daily       Sodium-Glucose Co-Transporter 2 (SGLT2) Inhibitors       JARDIANCE 25 MG TABS tablet TAKE 1 TABLET BY MOUTH EVERY DAY       Incretin Mimetic Agents       TRULICITY 3 MG/0.5ML SOPN INJECT 3 MG SUBCUTANEOUS EVERY 7 DAYS            Current Treatments: Diet, Insulin Injections, Oral Medication (taken by mouth)    Problem Solving:                 Reducing Risks:  Has dilated eye exam at least once a year?: Yes  Sees dentist every 6 months?: Yes  Feet checked by healthcare provider in the last year?: Yes    Healthy Coping:  Informal Support system:: Family, Spouse  Patient Activation Measure Survey Score:       No data to display                  Care Plan and Education Provided:  Healthy Eating: Balanced meals and Eating out, Monitoring: Blood glucose versus Continuous Glucose Monitoring and Frequency of monitoring, and Problem Solving: Low glucose - causes, signs/symptoms, treatment and prevention and Rule of 15 and carrying a carbohydrate source at all times in case of low " glucose     The service provided today was under the supervising provider, Junior Pichardo, who was available if needed.       Time Spent: 60 minutes  Encounter Type: Individual    Any diabetes medication dose changes were made via the CDE Protocol per the patient's referring provider. A copy of this encounter was shared with the provider.

## 2024-08-06 NOTE — LETTER
8/6/2024         RE: Ced Fajardo  [id number] [address] [place] [id number]        Dear Colleague,    Thank you for referring your patient, Ced Fajardo, to the [facility]. Please see a copy of my visit note below.    Diabetes Self-Management Education & Support    Presents for:      Type of Service: In Person Visit      REPORTS:            Pt verbalized understanding of concepts discussed and recommendations provided today.       Continue education with the following diabetes management concepts: Healthy Eating and Monitoring    ASSESSMENT:  Met with Efrain to discuss CGM and insulin today.    He is currently taking Tresiba 40 units in the morning, Trulicity 3mg on Sundays, Metformin 1000mg after having his morning cereal and after dinner, and Jardiance 25mg after cereal.    His meals are pretty regular.  He wakes about 4am and eats breakfast between 4-5am.  He usually has cereal.  Previously he was eating Cheerios or Raisin Bran.  He recently switched to keto cereal.  He takes his Tresiba and medications after he has breakfast.  Lunch is about 11am and dinner is between 4-5pm.    We reviewed his higher readings and compared to his food log.  We reviewed the effect his cereal choices are having on his glucose in the morning. On Friday June 21st he had pizza for dinner and was surprised his readings did not get higher.  Stated they have [employer] sandwiches once weekly.  According to his food log this was on Wednesday June 19th and Monday June 24th.  Reviewed how these choices effected his glucose.  He is also having trouble with lows during his early morning hours.  Stated he does not feel these or wake up.  Because of this we discussed decreasing his Tresiba to 32 units and see how is glucose responds.  He is okay with trying this for about a month.     Glucose Patterns & Trends:  Hyperglycemia, weekend- postmeal and weekday- postmeal    PLAN    Decrease Tresiba to 32 units.  Will  "work on coverage for personal CGM.  Topics to cover at upcoming visits: Monitoring, Taking Medication, and Reducing Risks    Follow-up: Please schedule for 4-6 weeks.  Nothing scheduled at this time.    See Care Plan for co-developed, patient-state behavior change goals.  AVS provided for patient today.    Education Materials Provided:  No new materials provided today      SUBJECTIVE/OBJECTIVE:  Diabetes education in the past 24mo: No  Diabetes type: Type 1  Disease course: Worsening  Cultural Influences/Ethnic Background:  Not  or       Diabetes Symptoms & Complications:  Diabetes Related Symptoms: None  Weight trend: Stable  Symptom course: Stable  Disease course: Worsening       Patient Problem List and Family Medical History reviewed for relevant medical history, current medical status, and diabetes risk factors.    Vitals:  There were no vitals taken for this visit.  Estimated body mass index is 26.63 kg/m  as calculated from the following:    Height as of 12/1/23: 1.676 m (5' 6\").    Weight as of 6/11/24: 74.8 kg (165 lb).   Last 3 BP:   BP Readings from Last 3 Encounters:   06/11/24 120/64   12/01/23 132/74   05/26/23 120/62       History   Smoking Status     Never   Smokeless Tobacco     Never       Labs:  Lab Results   Component Value Date    A1C 7.7 06/11/2024     Lab Results   Component Value Date     12/01/2023     06/21/2022     Lab Results   Component Value Date    LDL 68 12/01/2023     11/04/2021     Direct Measure HDL   Date Value Ref Range Status   12/01/2023 56 >=40 mg/dL Final   ]  GFR Estimate   Date Value Ref Range Status   06/11/2024 >90 >60 mL/min/1.73m2 Final     Comment:     The generation of the estimated GFR is currently based on binary male or female sex. If the electronic health record information indicates another gender identity or if Legal Sex is recorded as \"Unknown\", GFR estimates are not automatically calculated, and application of GFR equations " "or a direct GFR measurement should be considered according to the individual's appropriate clinical context.   10/30/2020 >60 >60 mL/min/1.73m2 Final     GFR Estimate If Black   Date Value Ref Range Status   10/30/2020 >60 >60 mL/min/1.73m2 Final     Lab Results   Component Value Date    CR 0.85 06/11/2024     No results found for: \"MICROALBUMIN\"    Healthy Eating:  Cultural/Holiness diet restrictions?: No    Being Active:  Barrier to exercise: None    Monitoring:  Blood Glucose Meter: Unknown  Times checking blood sugar at home (number): 1  Times checking blood sugar at home (per): Day        Taking Medications:  Diabetes Medication(s)       Biguanides       metFORMIN (GLUCOPHAGE) 1000 MG tablet TAKE 1 TABLET(1000 MG) BY MOUTH EVERY 12 HOURS       Diabetic Other       Glucagon (BAQSIMI ONE PACK) 3 MG/DOSE POWD Spray 1 spray (3 mg) in nostril daily as needed (severe hypoglycemia)       Insulin       insulin aspart (FIASP FLEXTOUCH) 100 UNIT/ML pen-injector Inject 5 Units subcutaneously 3 times daily (with meals)     insulin degludec (TRESIBA FLEXTOUCH) 100 UNIT/ML pen Inject 32 Units Subcutaneous daily     Insulin Degludec 100 UNIT/ML SOLN Inject 45 Units Subcutaneous daily       Sodium-Glucose Co-Transporter 2 (SGLT2) Inhibitors       JARDIANCE 25 MG TABS tablet TAKE 1 TABLET BY MOUTH EVERY DAY       Incretin Mimetic Agents       TRULICITY 3 MG/0.5ML SOPN INJECT 3 MG SUBCUTANEOUS EVERY 7 DAYS            Current Treatments: Diet, Insulin Injections, Oral Medication (taken by mouth)    Problem Solving:                 Reducing Risks:  Has dilated eye exam at least once a year?: Yes  Sees dentist every 6 months?: Yes  Feet checked by healthcare provider in the last year?: Yes    Healthy Coping:  Informal Support system:: Family, Spouse  Patient Activation Measure Survey Score:       No data to display                  Care Plan and Education Provided:  Healthy Eating: Balanced meals and Eating out, Monitoring: Blood " glucose versus Continuous Glucose Monitoring and Frequency of monitoring, and Problem Solving: Low glucose - causes, signs/symptoms, treatment and prevention and Rule of 15 and carrying a carbohydrate source at all times in case of low glucose     The service provided today was under the supervising provider, Junior Pichardo, who was available if needed.       Time Spent: 60 minutes  Encounter Type: Individual    Any diabetes medication dose changes were made via the CDE Protocol per the patient's referring provider. A copy of this encounter was shared with the provider.

## 2024-08-24 DIAGNOSIS — E11.65 TYPE 2 DIABETES MELLITUS WITH HYPERGLYCEMIA, WITH LONG-TERM CURRENT USE OF INSULIN (H): ICD-10-CM

## 2024-08-24 DIAGNOSIS — Z79.4 TYPE 2 DIABETES MELLITUS WITH HYPERGLYCEMIA, WITH LONG-TERM CURRENT USE OF INSULIN (H): ICD-10-CM

## 2024-08-26 RX ORDER — DULAGLUTIDE 3 MG/.5ML
3 INJECTION, SOLUTION SUBCUTANEOUS
Qty: 2 ML | Refills: 2 | Status: SHIPPED | OUTPATIENT
Start: 2024-08-26

## 2024-09-04 DIAGNOSIS — E11.65 TYPE 2 DIABETES MELLITUS WITH HYPERGLYCEMIA, WITH LONG-TERM CURRENT USE OF INSULIN (H): Primary | ICD-10-CM

## 2024-09-04 DIAGNOSIS — Z79.4 TYPE 2 DIABETES MELLITUS WITH HYPERGLYCEMIA, WITH LONG-TERM CURRENT USE OF INSULIN (H): Primary | ICD-10-CM

## 2024-09-04 RX ORDER — ACYCLOVIR 400 MG/1
1 TABLET ORAL ONCE
Qty: 1 EACH | Refills: 1 | Status: SHIPPED | OUTPATIENT
Start: 2024-09-04 | End: 2024-09-04

## 2024-09-04 RX ORDER — ACYCLOVIR 400 MG/1
1 TABLET ORAL
Qty: 9 EACH | Refills: 1 | Status: SHIPPED | OUTPATIENT
Start: 2024-09-04

## 2024-09-09 ENCOUNTER — TELEPHONE (OUTPATIENT)
Dept: ENDOCRINOLOGY | Facility: CLINIC | Age: 69
End: 2024-09-09
Payer: MEDICARE

## 2024-09-09 NOTE — TELEPHONE ENCOUNTER
Hello,     This is [facility] requesting MD Nasir Young attest/sign off on Diabetic Education notes from 8/6/2024 visit for Medicare compliance/coverage of CGM.       VISIT NOTE REQUIREMENTS: (must state the following)  Patient must be seen/clinical notes must be written in the last 6 months by the prescribing provider.  Must state that the patient is injecting insulin 1 time daily or more (Can be written that patient is injecting with insulin pump) We cannot accept medication list as insulin regimen.  Must state that the patient is a type 1 or type 2 diabetic.  Must state that patient is using CGM        As a reminder, Medicare requires patients to be seen every 3 months for insulin supplies and every 6 months for CGMs. The provider who sees the patient must be the provider on the prescription, must be written on the day of or after office visit date and office visit must include notes about diabetes and insulin regimen. The Diabetes Care Services team at [facility] may request new prescriptions before renewal dates of the prescription is filled over the allowable amount. Writing the order to match what is above will help ensure we will only need to request every 3 or 6 months.    If you have any questions regarding these requests please call us at [phone] or send a message to the [facility] pool     Thank you!     [facility]  Diabetes Care Services Team  [address] [place] [address]  Provider Phone: [phone]  Patient Line: [phone]  Fax: [phone]  E-mail: [email or web address]

## 2024-09-26 ENCOUNTER — PATIENT OUTREACH (OUTPATIENT)
Dept: CARE COORDINATION | Facility: CLINIC | Age: 69
End: 2024-09-26
Payer: MEDICARE

## 2024-10-08 DIAGNOSIS — Z79.4 TYPE 2 DIABETES MELLITUS WITH HYPERGLYCEMIA, WITH LONG-TERM CURRENT USE OF INSULIN (H): ICD-10-CM

## 2024-10-08 DIAGNOSIS — E11.65 TYPE 2 DIABETES MELLITUS WITH HYPERGLYCEMIA, WITH LONG-TERM CURRENT USE OF INSULIN (H): ICD-10-CM

## 2024-10-08 RX ORDER — INSULIN DEGLUDEC 100 U/ML
32 INJECTION, SOLUTION SUBCUTANEOUS DAILY
Qty: 30 ML | Refills: 1 | Status: SHIPPED | OUTPATIENT
Start: 2024-10-08

## 2024-10-08 NOTE — TELEPHONE ENCOUNTER
Requested Prescriptions   Pending Prescriptions Disp Refills    TRESIBA FLEXTOUCH 100 UNIT/ML pen [Pharmacy Med Name: TRESIBA FLEXTOUCH 100 UNIT/ML]       Sig: INJECT 32 UNITS SUBCUTANEOUS DAILY       Insulin Protocol Failed - 10/8/2024 12:33 AM        Failed - Chart Review Required     Review Chart.    Do not approve if insulin is used in a pump.  Instead, direct refill request to the patient's endocrinologist.  If the patient doesn't have an endocrinologist, then send the refill to the patient's PCP for review            Passed - Medication is active on med list        Passed - Has GFR on file in past 12 months and most recent value is normal        Passed - Recent (6 mo) or future (90 days) visit within the authorizing provider's specialty     The patient must have completed an in-person or virtual visit within the past 6 months or has a future visit scheduled within the next 90 days with the authorizing provider s specialty.  Urgent care and e-visits do not quality as an office visit for this protocol.          Passed - Medication indicated for associated diagnosis     Medication is associated with one or more of the following diagnoses:   - Type 1 diabetes mellitus  - Type 2 diabetes mellitus  - Diabetic nephropathy; Prophylaxis  - Neuropathy due to diabetes mellitus; Prophylaxis  - Retinopathy due to diabetes mellitus; Prophylaxis  - Diabetes mellitus associated with cystic fibrosis  - Disorder of cardiovascular system; Prophylaxis - Type 1 diabetes mellitus   - Disorder of cardiovascular system; Prophylaxis - Type 2 diabetes mellitus            Passed - Patient is 18 years of age or older

## 2024-10-09 DIAGNOSIS — I10 ESSENTIAL HYPERTENSION: ICD-10-CM

## 2024-10-09 RX ORDER — LISINOPRIL 10 MG/1
10 TABLET ORAL DAILY
Qty: 90 TABLET | Refills: 0 | Status: SHIPPED | OUTPATIENT
Start: 2024-10-09

## 2024-10-10 NOTE — TELEPHONE ENCOUNTER
Called left message for pt refill was sent to  pharmacy but he is due for a physical or med check in December for further refills and he can call [phone] for help to schedule    MyChart message was also sent to pt    Rae Bucio MA

## 2024-10-22 DIAGNOSIS — E11.65 TYPE 2 DIABETES MELLITUS WITH HYPERGLYCEMIA, WITH LONG-TERM CURRENT USE OF INSULIN (H): ICD-10-CM

## 2024-10-22 DIAGNOSIS — Z79.4 TYPE 2 DIABETES MELLITUS WITH HYPERGLYCEMIA, WITH LONG-TERM CURRENT USE OF INSULIN (H): ICD-10-CM

## 2024-10-23 RX ORDER — INSULIN ASPART INJECTION 100 [IU]/ML
5 INJECTION, SOLUTION SUBCUTANEOUS
Qty: 15 ML | Refills: 2 | Status: SHIPPED | OUTPATIENT
Start: 2024-10-23

## 2024-10-24 DIAGNOSIS — E78.5 DYSLIPIDEMIA: ICD-10-CM

## 2024-10-24 RX ORDER — PRAVASTATIN SODIUM 80 MG/1
80 TABLET ORAL DAILY
Qty: 90 TABLET | Refills: 3 | OUTPATIENT
Start: 2024-10-24

## 2024-10-28 ENCOUNTER — TRANSFERRED RECORDS (OUTPATIENT)
Dept: HEALTH INFORMATION MANAGEMENT | Facility: CLINIC | Age: 69
End: 2024-10-28
Payer: MEDICARE

## 2024-11-03 DIAGNOSIS — Z79.4 TYPE 2 DIABETES MELLITUS WITH OTHER SPECIFIED COMPLICATION, WITH LONG-TERM CURRENT USE OF INSULIN (H): Primary | ICD-10-CM

## 2024-11-03 DIAGNOSIS — E11.69 TYPE 2 DIABETES MELLITUS WITH OTHER SPECIFIED COMPLICATION, WITH LONG-TERM CURRENT USE OF INSULIN (H): Primary | ICD-10-CM

## 2024-11-18 SDOH — HEALTH STABILITY: PHYSICAL HEALTH: ON AVERAGE, HOW MANY MINUTES DO YOU ENGAGE IN EXERCISE AT THIS LEVEL?: 120 MIN

## 2024-11-18 SDOH — HEALTH STABILITY: PHYSICAL HEALTH: ON AVERAGE, HOW MANY DAYS PER WEEK DO YOU ENGAGE IN MODERATE TO STRENUOUS EXERCISE (LIKE A BRISK WALK)?: 7 DAYS

## 2024-11-18 ASSESSMENT — SOCIAL DETERMINANTS OF HEALTH (SDOH): HOW OFTEN DO YOU GET TOGETHER WITH FRIENDS OR RELATIVES?: TWICE A WEEK

## 2024-11-20 ENCOUNTER — OFFICE VISIT (OUTPATIENT)
Dept: FAMILY MEDICINE | Facility: CLINIC | Age: 69
End: 2024-11-20
Payer: MEDICARE

## 2024-11-20 VITALS
DIASTOLIC BLOOD PRESSURE: 62 MMHG | TEMPERATURE: 98 F | BODY MASS INDEX: 26.52 KG/M2 | SYSTOLIC BLOOD PRESSURE: 126 MMHG | HEIGHT: 66 IN | OXYGEN SATURATION: 99 % | RESPIRATION RATE: 18 BRPM | WEIGHT: 165 LBS | HEART RATE: 74 BPM

## 2024-11-20 DIAGNOSIS — N52.9 ERECTILE DYSFUNCTION, UNSPECIFIED ERECTILE DYSFUNCTION TYPE: ICD-10-CM

## 2024-11-20 DIAGNOSIS — I10 ESSENTIAL HYPERTENSION: ICD-10-CM

## 2024-11-20 DIAGNOSIS — E11.65 TYPE 2 DIABETES MELLITUS WITH HYPERGLYCEMIA, WITH LONG-TERM CURRENT USE OF INSULIN (H): ICD-10-CM

## 2024-11-20 DIAGNOSIS — E78.5 DYSLIPIDEMIA: ICD-10-CM

## 2024-11-20 DIAGNOSIS — Z79.899 MEDICATION MANAGEMENT: ICD-10-CM

## 2024-11-20 DIAGNOSIS — Z79.4 TYPE 2 DIABETES MELLITUS WITH HYPERGLYCEMIA, WITH LONG-TERM CURRENT USE OF INSULIN (H): ICD-10-CM

## 2024-11-20 DIAGNOSIS — Z00.00 ENCOUNTER FOR MEDICARE ANNUAL WELLNESS EXAM: Primary | ICD-10-CM

## 2024-11-20 DIAGNOSIS — Z12.5 PROSTATE CANCER SCREENING: ICD-10-CM

## 2024-11-20 LAB
ALBUMIN SERPL BCG-MCNC: 4.4 G/DL (ref 3.5–5.2)
ALBUMIN UR-MCNC: NEGATIVE MG/DL
ALP SERPL-CCNC: 52 U/L (ref 40–150)
ALT SERPL W P-5'-P-CCNC: 19 U/L (ref 0–70)
ANION GAP SERPL CALCULATED.3IONS-SCNC: 11 MMOL/L (ref 7–15)
APPEARANCE UR: CLEAR
AST SERPL W P-5'-P-CCNC: 24 U/L (ref 0–45)
BILIRUB SERPL-MCNC: 0.4 MG/DL
BILIRUB UR QL STRIP: NEGATIVE
BUN SERPL-MCNC: 19.4 MG/DL (ref 8–23)
CALCIUM SERPL-MCNC: 10.3 MG/DL (ref 8.8–10.4)
CHLORIDE SERPL-SCNC: 100 MMOL/L (ref 98–107)
COLOR UR AUTO: YELLOW
CREAT SERPL-MCNC: 0.81 MG/DL (ref 0.51–1.17)
EGFRCR SERPLBLD CKD-EPI 2021: >90 ML/MIN/1.73M2
ERYTHROCYTE [DISTWIDTH] IN BLOOD BY AUTOMATED COUNT: 12.1 % (ref 10–15)
GLUCOSE SERPL-MCNC: 112 MG/DL (ref 70–99)
GLUCOSE UR STRIP-MCNC: 500 MG/DL
HCO3 SERPL-SCNC: 25 MMOL/L (ref 22–29)
HCT VFR BLD AUTO: 45.9 % (ref 35–53)
HGB BLD-MCNC: 15.8 G/DL (ref 11.7–17.7)
HGB UR QL STRIP: NEGATIVE
KETONES UR STRIP-MCNC: NEGATIVE MG/DL
LEUKOCYTE ESTERASE UR QL STRIP: NEGATIVE
MCH RBC QN AUTO: 32.5 PG (ref 26.5–33)
MCHC RBC AUTO-ENTMCNC: 34.4 G/DL (ref 31.5–36.5)
MCV RBC AUTO: 94 FL (ref 78–100)
NITRATE UR QL: NEGATIVE
PH UR STRIP: 5.5 [PH] (ref 5–8)
PLATELET # BLD AUTO: 208 10E3/UL (ref 150–450)
POTASSIUM SERPL-SCNC: 4.9 MMOL/L (ref 3.4–5.3)
PROT SERPL-MCNC: 7.3 G/DL (ref 6.4–8.3)
PSA SERPL DL<=0.01 NG/ML-MCNC: 3.29 NG/ML (ref 0–4.5)
RBC # BLD AUTO: 4.86 10E6/UL (ref 3.8–5.9)
SODIUM SERPL-SCNC: 136 MMOL/L (ref 135–145)
SP GR UR STRIP: 1.02 (ref 1–1.03)
TSH SERPL DL<=0.005 MIU/L-ACNC: 1.54 UIU/ML (ref 0.3–4.2)
UROBILINOGEN UR STRIP-ACNC: 0.2 E.U./DL
WBC # BLD AUTO: 7.6 10E3/UL (ref 4–11)

## 2024-11-20 RX ORDER — PRAVASTATIN SODIUM 80 MG/1
80 TABLET ORAL DAILY
Qty: 90 TABLET | Refills: 3 | Status: SHIPPED | OUTPATIENT
Start: 2024-11-20

## 2024-11-20 RX ORDER — DULAGLUTIDE 3 MG/.5ML
3 INJECTION, SOLUTION SUBCUTANEOUS
Qty: 6 ML | Refills: 0 | Status: SHIPPED | OUTPATIENT
Start: 2024-11-20

## 2024-11-20 RX ORDER — LISINOPRIL 10 MG/1
10 TABLET ORAL DAILY
Qty: 90 TABLET | Refills: 3 | Status: SHIPPED | OUTPATIENT
Start: 2024-11-20

## 2024-11-20 RX ORDER — SILDENAFIL 50 MG/1
25-50 TABLET, FILM COATED ORAL DAILY PRN
Qty: 30 TABLET | Refills: 5 | Status: SHIPPED | OUTPATIENT
Start: 2024-11-20

## 2024-11-20 ASSESSMENT — PAIN SCALES - GENERAL: PAINLEVEL_OUTOF10: NO PAIN (0)

## 2024-11-20 NOTE — PROGRESS NOTES
"Preventive Care Visit  [facility]  JUAN Ruiz CNP, Family Medicine  Nov 20, 2024      Assessment & Plan     Encounter for Medicare annual wellness exam  Recommend consuming a healthy diet and exercising.  He will consider obtaining the shingles and RSV vaccines at the pharmacy.  He declines bone density scan.  He is up-to-date on colorectal cancer screening.  - CBC with platelets  - TSH with free T4 reflex  - UA Macroscopic with reflex to Microscopic and Culture  - CBC with platelets  - TSH with free T4 reflex  - UA Macroscopic with reflex to Microscopic and Culture    Prostate cancer screening  - Prostate Specific Antigen Screen  - Prostate Specific Antigen Screen    Essential hypertension  This is controlled.  He continues lisinopril.  - lisinopril (ZESTRIL) 10 MG tablet  Dispense: 90 tablet; Refill: 3    Type 2 diabetes mellitus with hyperglycemia, with long-term current use of insulin (H)  Last A1c was 7.7%.  He is followed by endocrinology.    Dyslipidemia  Goal LDL is less than 100.  He continues pravastatin.  This is controlled.  - pravastatin (PRAVACHOL) 80 MG tablet  Dispense: 90 tablet; Refill: 3    Erectile dysfunction, unspecified erectile dysfunction type  He continues sildenafil as needed.  - sildenafil (VIAGRA) 50 MG tablet  Dispense: 30 tablet; Refill: 5    Medication management  - Comprehensive metabolic panel  - Comprehensive metabolic panel            BMI  Estimated body mass index is 26.63 kg/m  as calculated from the following:    Height as of this encounter: 1.676 m (5' 6\").    Weight as of this encounter: 74.8 kg (165 lb).   Weight management plan: Discussed healthy diet and exercise guidelines    Counseling  Appropriate preventive services were addressed with this patient via screening, questionnaire, or discussion as appropriate for fall prevention, nutrition, physical activity, Tobacco-use cessation, social engagement, weight loss and cognition.  Checklist " reviewing preventive services available has been given to the patient.  Reviewed patient's diet, addressing concerns and/or questions.         Kathrin Zuniga is a 68 year old, presenting for the following:  Wellness Visit        11/20/2024    11:02 AM   Additional Questions   Roomed by Rae CEJA  Patient has been  for 44 years.  He has no concerns for STDs.  He has 2 children (son and daughter) and four grandchildren.  He is consuming a healthy diet.  He exercises by rowing and walking.  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 No underlying masses. No bile duct dilatation. No pancreatic duct dilatation. He is currently taking Tresiba 40 units in the morning, Trulicity 3mg on Sundays, Metformin 1000mg after having his morning cereal and after dinner, and Jardiance 25mg after cereal.  Last A1c was 7.7% on 6/11/2024.  He is taking pravastatin 80 mg daily for lipid management.  Last cholesterol check was on 12/1/2023 with a total cholesterol 139, triglycerides 76, HDL 56, LDL 68.  He is taking lisinopril 10 mg daily for renal protection.  He is using sildenafil as needed for male erectile dysfunction.  Overall, he feels well today and has no other concerns.      Health Care Directive  Patient does not have a Health Care Directive: Patient states has Advance Directive and will bring in a copy to clinic.      11/18/2024   General Health   How would you rate your overall physical health? Excellent   Feel stress (tense, anxious, or unable to sleep) Not at all            11/18/2024   Nutrition   Diet: Regular (no restrictions)    Diabetic       Multiple values from one day are sorted in reverse-chronological order         11/18/2024   Exercise   Days per week of moderate/strenous exercise 7 days    Average minutes spent exercising at this level 120 min            11/18/2024   Social Factors   Frequency of gathering with friends or relatives Twice a week   Worry food won't last until get money to buy more No   Food not last or not have enough money for food? No   Do you have housing? (Housing is defined as stable permanent housing and does not include staying ouside in a car, in a tent, in an abandoned building, in an overnight shelter, or couch-surfing.) Yes   Are you worried about losing your housing? No   Lack of transportation? No   Unable to get utilities (heat,electricity)? No            11/20/2024   Fall Risk   Fallen 2 or more times in the past year? No    Trouble with walking or balance? No        Patient-reported          11/18/2024   Activities of Daily Living- Home Safety   Needs help with the following daily activites None of the above   Safety concerns in the home None of the above            11/18/2024   Dental   Dentist two times every year? Yes            11/18/2024   Hearing Screening   Hearing concerns? None of the above            11/18/2024   Driving Risk Screening   Patient/family members have concerns about driving No            11/18/2024   General Alertness/Fatigue Screening   Have you been more tired than usual lately? No            11/18/2024   Urinary Incontinence Screening   Bothered by leaking urine in past 6 months No            11/18/2024   TB Screening   Were you born outside of the [place]? No            Today's PHQ-2 Score:       11/20/2024    10:58 AM   PHQ-2 ( 1999 Pfizer)   Q1: Little interest or pleasure in doing things 0    Q2: Feeling down, depressed or hopeless 0    PHQ-2 Score 0    Q1: Little interest or pleasure in doing things Not at all   Q2: Feeling down, depressed or hopeless Not at all   PHQ-2 Score 0       Patient-reported           11/18/2024   Substance Use   Alcohol more than 3/day or more than 7/wk Not Applicable   Do you have a current opioid prescription? No    How severe/bad is pain from 1 to 10? 0/10 (No Pain)   Do you use any other substances recreationally? No        Social History     Tobacco Use    Smoking status: Never     Passive exposure: Never    Smokeless tobacco: Never   Vaping Use    Vaping status: Never Used   Substance Use Topics    Alcohol use: Yes     Comment: rare    Drug use: No           11/18/2024   AAA Screening   Family history of Abdominal Aortic Aneurysm (AAA)? No      Last PSA:   Prostate Specific Antigen Screen   Date Value Ref Range Status   03/30/2023 3.37 0.00 - 4.50 ng/mL Final   10/30/2020 2.6 0.0 - 4.5 ng/mL Final     ASCVD Risk   The 10-year ASCVD risk score (Goran MONTOYA, et al., 2019) is: 21%    Values used to calculate the score:      Age: 68 years      Sex: Male      Is Non- : No      Diabetic: Yes      Tobacco smoker: No      Systolic Blood Pressure: 126 mmHg      Is BP treated: Yes      HDL Cholesterol: 56 mg/dL      Total Cholesterol: 139 mg/dL          Reviewed and updated as needed this visit by Provider                    Past Medical History:   Diagnosis Date    Diabetes mellitus (H)      History reviewed. No pertinent surgical history.  Current Outpatient Medications   Medication Sig Dispense Refill    aspirin 81 MG EC tablet [ASPIRIN 81 MG EC TABLET] Take 81 mg by mouth daily.      blood glucose test (GLUCOSE BLOOD) strips [BLOOD GLUCOSE TEST (GLUCOSE BLOOD) STRIPS] Use 1 each As Directed 3 (three) times a day. Accu-Chek Jessica In Vitro Strip - Use 1 strip 3 times daily 100 strip 5    Continuous Glucose Sensor (DEXCOM G7 SENSOR) MISC 1 kit every 10 days. Use per manufacture's directions to check glucose daily. 9 each 1    Glucagon (BAQSIMI ONE PACK) 3 MG/DOSE POWD Spray 1 spray (3 mg) in nostril daily as needed (severe hypoglycemia) 1 each 3    insulin aspart (FIASP FLEXTOUCH) 100 UNIT/ML pen-injector INJECT 5 UNITS SUBCUTANEOUSLY 3 TIMES DAILY (WITH MEALS) 15 mL 2    insulin degludec (TRESIBA  FLEXTOUCH) 100 UNIT/ML pen INJECT 32 UNITS SUBCUTANEOUS DAILY 30 mL 1    Insulin Degludec 100 UNIT/ML SOLN Inject 45 Units Subcutaneous daily 50 mL 4    insulin pen needle (31G X 5 MM) 31G X 5 MM miscellaneous Use 4 pen needles daily or as directed. 360 each 4    insulin pen needle (B-D U/F) 31G X 5 MM miscellaneous USE ONCE DAILY AS DIRECTED 100 each 0    JARDIANCE 25 MG TABS tablet TAKE 1 TABLET BY MOUTH EVERY DAY 90 tablet 4    Lancets MISC [LANCETS MISC] Use As Directed. Accu-Chek MultiClix Lancets      lisinopril (ZESTRIL) 10 MG tablet Take 1 tablet (10 mg) by mouth daily. 90 tablet 3    metFORMIN (GLUCOPHAGE) 1000 MG tablet TAKE 1 TABLET(1000 MG) BY MOUTH EVERY 12 HOURS 180 tablet 3    multivitamin (ONE A DAY) per tablet [MULTIVITAMIN (ONE A DAY) PER TABLET] Take 1 tablet by mouth daily.      pravastatin (PRAVACHOL) 80 MG tablet Take 1 tablet (80 mg) by mouth daily. 90 tablet 3    sildenafil (VIAGRA) 50 MG tablet Take 0.5-1 tablets (25-50 mg) by mouth daily as needed (erectile difficulty). 30 tablet 5    TRULICITY 3 MG/0.5ML SOAJ INJECT 3 MG SUBCUTANEOUS EVERY 7 DAYS 6 mL 0     Current providers sharing in care for this patient include:  Patient Care Team:  Raquel Reddy APRN CNP as PCP - General  Julieta Lopez PharmD as Pharmacist (Pharmacist)  Raquel Reddy APRN CNP as Assigned PCP  Nasir Young MD as MD (Endocrinology, Diabetes, and Metabolism)  Nasir Young MD as Assigned Endocrinology Provider  Sofi Oconnor [facility] as Pharmacist (Diabetes Education)    The following health maintenance items are reviewed in Epic and correct as of today:  Health Maintenance   Topic Date Due    DEXA  Never done    ZOSTER IMMUNIZATION (1 of 2) Never done    RSV VACCINE (1 - Risk 60-74 years 1-dose series) Never done    MEDICARE ANNUAL WELLNESS VISIT  03/30/2024    BMP  03/30/2024    ANNUAL REVIEW OF HM ORDERS  03/30/2024    LIPID  12/01/2024    MICROALBUMIN  12/01/2024    DIABETIC FOOT EXAM  12/01/2024  "   A1C  12/11/2024    COLORECTAL CANCER SCREENING  07/27/2025    EYE EXAM  10/28/2025    FALL RISK ASSESSMENT  11/20/2025    ADVANCE CARE PLANNING  03/30/2028    DTAP/TDAP/TD IMMUNIZATION (3 - Td or Tdap) 03/26/2029    HEPATITIS C SCREENING  Completed    PHQ-2 (once per calendar year)  Completed    INFLUENZA VACCINE  Completed    Pneumococcal Vaccine: 65+ Years  Completed    COVID-19 Vaccine  Completed    HPV IMMUNIZATION  Aged Out    MENINGITIS IMMUNIZATION  Aged Out    RSV MONOCLONAL ANTIBODY  Aged Out         Review of Systems  Constitutional, HEENT, cardiovascular, pulmonary, GI, , musculoskeletal, neuro, skin, endocrine and psych systems are negative, except as otherwise noted.     Objective    Exam  /62   Pulse 74   Temp 98  F (36.7  C)   Resp 18   Ht 1.676 m (5' 6\")   Wt 74.8 kg (165 lb)   SpO2 99%   BMI 26.63 kg/m     Estimated body mass index is 26.63 kg/m  as calculated from the following:    Height as of this encounter: 1.676 m (5' 6\").    Weight as of this encounter: 74.8 kg (165 lb).    Physical Exam  GENERAL: alert and no distress  EYES: Eyes grossly normal to inspection, PERRL and conjunctivae and sclerae normal  HENT: ear canals and TM's normal, nose and mouth without ulcers or lesions  NECK: no adenopathy, no asymmetry, masses, or scars  RESP: lungs clear to auscultation - no rales, rhonchi or wheezes  CV: regular rate and rhythm, normal S1 S2, no S3 or S4, no murmur, click or rub, no peripheral edema  ABDOMEN: soft, nontender, no hepatosplenomegaly, no masses and bowel sounds normal  MS: no gross musculoskeletal defects noted, no edema  SKIN: no suspicious lesions or rashes  NEURO: Normal strength and tone, mentation intact and speech normal  PSYCH: mentation appears normal, affect normal/bright        11/20/2024   Mini Cog   Clock Draw Score 2 Normal   3 Item Recall 3 objects recalled   Mini Cog Total Score 5                 Signed Electronically by: JUAN Ruiz CNP    "

## 2024-11-20 NOTE — TELEPHONE ENCOUNTER
Requested Prescriptions   Pending Prescriptions Disp Refills    TRULICITY 3 MG/0.5ML SOAJ [Pharmacy Med Name: TRULICITY 3 MG/0.5 ML PEN]  2     Sig: INJECT 3 MG SUBCUTANEOUS EVERY 7 DAYS       GLP-1 Agonists Protocol Passed - 11/20/2024  8:51 AM        Passed - HgbA1C in past 3 or 6 months     If HgbA1C is 8 or greater, it needs to be on file within the past 3 months.  If less than 8, must be on file within the past 6 months.     Recent Labs   Lab Test 06/11/24  0841   A1C 7.7*             Passed - Medication is active on med list        Passed - Has GFR on file in past 12 months and most recent value is normal        Passed - Recent (6 mo) or future (90 days) visit within the authorizing provider's specialty     The patient must have completed an in-person or virtual visit within the past 6 months or has a future visit scheduled within the next 90 days with the authorizing provider s specialty.  Urgent care and e-visits do not quality as an office visit for this protocol.          Passed - Medication indicated for associated diagnosis     Medication is associated with one or more of the following diagnoses:     Type 2 diabetes mellitus           Passed - Patient is age 18 or older

## 2024-11-26 ENCOUNTER — ALLIED HEALTH/NURSE VISIT (OUTPATIENT)
Dept: EDUCATION SERVICES | Facility: CLINIC | Age: 69
End: 2024-11-26
Payer: MEDICARE

## 2024-11-26 VITALS — WEIGHT: 165 LBS | BODY MASS INDEX: 26.63 KG/M2

## 2024-11-26 DIAGNOSIS — Z79.4 TYPE 2 DIABETES MELLITUS WITH HYPERGLYCEMIA, WITH LONG-TERM CURRENT USE OF INSULIN (H): Primary | ICD-10-CM

## 2024-11-26 DIAGNOSIS — E11.65 TYPE 2 DIABETES MELLITUS WITH HYPERGLYCEMIA, WITH LONG-TERM CURRENT USE OF INSULIN (H): Primary | ICD-10-CM

## 2024-11-26 NOTE — LETTER
11/26/2024         RE: Ced Fajardo  [address] [address] [place] [id number]        Dear Colleague,    Thank you for referring your patient, Ced Fajardo, to the [facility]. Please see a copy of my visit note below.    Diabetes Self-Management Education & Support    Presents for:      Type of Service: In Person Visit      Assessment  Efrain is here alone today for his Diabetes Education.  His readings have been increasing and not sure why.    He is currently taking Trulicity 3mg on Tuesdays, Tresiba 32 units between 6 and 7 in the morning and Fiasp 5-7 units depending on what his blood sugars are before eating.      He is using a Dexcom G7 to monitor his blood sugars.  The reports for the last two weeks are noted below:    His average glucose has been 143 and TIR is 81%.  We did review some of the higher elevations he is having.  He did change his morning cereal from a keto crunch to Multi-grain Cheerios.  This is causing elevations in the morning.  We discussed taking more insulin if he is going to have this cereal.  When reviewing the remaining elevations they are related to meal choices.  Discussed taking different amount of insulin with different meals as his glucose seems to react differently.    Stated he has not been walking his daughter's dog as much as usual over the last month.  Typically her tries to walk her dog twice a day.  Lately it has been once every day, sometimes twice if he has time.  The holidays and weather have effected his routine.    All additional questions and concerns addressed today.    Patient's most recent   Lab Results   Component Value Date    A1C 7.7 06/11/2024     is meeting goal of <8.0    Diabetes knowledge and skills assessment:   Patient is knowledgeable in diabetes management concepts related to: Healthy Eating, Being Active, Monitoring, and Taking Medication    Based on learning assessment above, most appropriate setting for further  "diabetes education would be: Individual setting.    Care Plan and Education Provided:  Healthy Eating: Label reading, Taking Medication: Action of prescribed medication(s), and adjusting dose based on meals    Patient verbalized understanding of diabetes self-management education concepts discussed, opportunities for ongoing education and support, and recommendations provided today.    Plan    Efrain will work on taking more insulin when his meal effects his blood sugar more, based on previous experience.  He plans on getting back on track with walking his daughter's dog after the first of the year.  Topics to cover at upcoming visits: Reducing Risks    Follow-up:  Upcoming Diabetes Ed Appointments     No appointments to display        See Care Plan for co-developed, patient-state behavior change goals.    Education Materials Provided:  No new materials provided today      Subjective/Objective  Efrain is an 69 year old year old, presenting for the following diabetes education related to:    Cultural Influences/Ethnic Background:  Not  or       Diabetes Symptoms & Complications:          Patient Problem List and Family Medical History reviewed for relevant medical history, current medical status, and diabetes risk factors.    Vitals:  Wt 74.8 kg (165 lb)   BMI 26.63 kg/m    Estimated body mass index is 26.63 kg/m  as calculated from the following:    Height as of 11/20/24: 1.676 m (5' 6\").    Weight as of this encounter: 74.8 kg (165 lb).   Last 3 BP:   BP Readings from Last 3 Encounters:   11/20/24 126/62   06/11/24 120/64   12/01/23 132/74       History   Smoking Status     Never   Smokeless Tobacco     Never       Labs:  Lab Results   Component Value Date    A1C 7.7 06/11/2024     Lab Results   Component Value Date     11/20/2024     06/21/2022     Lab Results   Component Value Date    LDL 68 12/01/2023     11/04/2021     Direct Measure HDL   Date Value Ref Range Status   12/01/2023 " "56 >=40 mg/dL Final   ]  GFR Estimate   Date Value Ref Range Status   11/20/2024 >90 >60 mL/min/1.73m2 Final     Comment:     The generation of the estimated GFR is currently based on binary male or female sex. If the electronic health record information indicates another gender identity or if Legal Sex is recorded as \"Unknown\", GFR estimates are not automatically calculated, and application of GFR equations or a direct GFR measurement should be considered according to the individual's appropriate clinical context.  eGFR calculated using 2021 CKD-EPI equation.   10/30/2020 >60 >60 mL/min/1.73m2 Final     GFR Estimate If Black   Date Value Ref Range Status   10/30/2020 >60 >60 mL/min/1.73m2 Final     Lab Results   Component Value Date    CR 0.81 11/20/2024     No results found for: \"MICROALBUMIN\"    Healthy Eating:       Being Active:       Monitoring:           Taking Medications:  Diabetes Medication(s)       Biguanides       metFORMIN (GLUCOPHAGE) 1000 MG tablet TAKE 1 TABLET(1000 MG) BY MOUTH EVERY 12 HOURS       Diabetic Other       Glucagon (BAQSIMI ONE PACK) 3 MG/DOSE POWD Spray 1 spray (3 mg) in nostril daily as needed (severe hypoglycemia)       Insulin       insulin aspart (FIASP FLEXTOUCH) 100 UNIT/ML pen-injector INJECT 5 UNITS SUBCUTANEOUSLY 3 TIMES DAILY (WITH MEALS)     insulin degludec (TRESIBA FLEXTOUCH) 100 UNIT/ML pen INJECT 32 UNITS SUBCUTANEOUS DAILY     Insulin Degludec 100 UNIT/ML SOLN Inject 45 Units Subcutaneous daily     NOVOLOG FLEXPEN RELION 100 UNIT/ML pen Inject 5 Units subcutaneously 3 times daily (with meals).       Sodium-Glucose Co-Transporter 2 (SGLT2) Inhibitors       JARDIANCE 25 MG TABS tablet TAKE 1 TABLET BY MOUTH EVERY DAY       Incretin Mimetic Agents       TRULICITY 3 MG/0.5ML SOAJ INJECT 3 MG SUBCUTANEOUS EVERY 7 DAYS               Sidra Reynaga RN  Time Spent: 60 minutes  Encounter Type: Individual    Any diabetes medication dose changes were made via the CDCES " Standing Orders under the patient's referring provider.

## 2024-12-02 ENCOUNTER — TELEPHONE (OUTPATIENT)
Dept: ENDOCRINOLOGY | Facility: CLINIC | Age: 69
End: 2024-12-02
Payer: MEDICARE

## 2024-12-02 DIAGNOSIS — E11.65 TYPE 2 DIABETES MELLITUS WITH HYPERGLYCEMIA, WITH LONG-TERM CURRENT USE OF INSULIN (H): Primary | ICD-10-CM

## 2024-12-02 DIAGNOSIS — Z79.4 TYPE 2 DIABETES MELLITUS WITH HYPERGLYCEMIA, WITH LONG-TERM CURRENT USE OF INSULIN (H): Primary | ICD-10-CM

## 2024-12-04 NOTE — TELEPHONE ENCOUNTER
Retail Pharmacy Prior Authorization Team   Phone: [phone]    PA Initiation    Medication: INSULIN ASPART  UNIT/ML SC SOPN  Insurance Company: [employer] - Phone [phone] Fax [phone]  Pharmacy [facility]illing the [id number] [place] [facility] [address] [place] [address]  Filling Pharmacy Phone:    Filling Pharmacy Fax:    Start Date: 12/4/2024    [id number]

## 2024-12-09 NOTE — TELEPHONE ENCOUNTER
PRIOR AUTHORIZATION DENIED    Medication: INSULIN ASPART  UNIT/ML SC SOPN  Insurance Company: [employer] - Phone [phone] Fax [phone]  Denial Date: 12/6/2024  Denial Reason(s):           Appeal Information:   Patient Notified: No

## 2024-12-10 RX ORDER — INSULIN ASPART 100 [IU]/ML
5 INJECTION, SOLUTION INTRAVENOUS; SUBCUTANEOUS
Qty: 15 ML | Refills: 1 | Status: SHIPPED | OUTPATIENT
Start: 2024-12-10

## 2024-12-11 ENCOUNTER — TELEPHONE (OUTPATIENT)
Dept: ENDOCRINOLOGY | Facility: CLINIC | Age: 69
End: 2024-12-11
Payer: MEDICARE

## 2024-12-11 NOTE — TELEPHONE ENCOUNTER
Retail Pharmacy Prior Authorization Team   Phone: [phone]    PA Initiation    Medication: NOVOLOG RELION 100 UNIT/ML IJ SOLN  Insurance Company: [employer] - Phone [phone] Fax [phone]  Pharm[id number] [place] [facility] [address]   Filling Pharmacy Phone:    Filling Pharmacy Fax:    Start Date: 12/11/2024    [id number]

## 2024-12-11 NOTE — TELEPHONE ENCOUNTER
New PA for brand Novolog will be done in another TE for documentation purposes. See other TE for status/outcome.

## 2024-12-11 NOTE — TELEPHONE ENCOUNTER
Retail Pharmacy Prior Authorization Team   Phone: [phone]    PA Initiation    Medication: NOVOLOG RELION 100 UNIT/ML IJ SOLN  Insurance Company: [employer] - Phone [phone] Fax [phone]  Pharm[facility] [id number] [place] [facility] [address]   Filling Pharmacy Phone:    Filling Pharmacy Fax:    Start Date: 12/11/2024    [id number]

## 2024-12-12 NOTE — TELEPHONE ENCOUNTER
Prior Authorization Approval    Medication: NOVOLOG RELION 100 UNIT/ML IJ SOLN  Authorization Effective Date: 12/12/2024  Authorization Expiration Date: 12/12/2099  Approved Dose/Quantity:   Reference #:     Insurance Company: [employer] - [employer] [phone] Fax [phone]  Expected CoPay: $    CoPay Card Available:      Financial Assistance Needed:   Which Pharmacy is filling the prescription: [facility] [address] [address]  Pharmacy Notified: Yes  Patient Notified: **Instructed pharmacy to notify patient when script is ready to /ship.**

## 2024-12-17 DIAGNOSIS — E11.65 TYPE 2 DIABETES MELLITUS WITH HYPERGLYCEMIA, WITHOUT LONG-TERM CURRENT USE OF INSULIN (H): ICD-10-CM

## 2025-01-12 ENCOUNTER — HEALTH MAINTENANCE LETTER (OUTPATIENT)
Age: 70
End: 2025-01-12

## 2025-01-15 NOTE — PROGRESS NOTES
Diabetes Self-Management Education & Support    Presents for:      Type of Service: In Person Visit      Assessment  Efrain is here alone today for his Diabetes Education.  His readings have been increasing and not sure why.    He is currently taking Trulicity 3mg on Tuesdays, Tresiba 32 units between 6 and 7 in the morning and Fiasp 5-7 units depending on what his blood sugars are before eating.      He is using a Dexcom G7 to monitor his blood sugars.  The reports for the last two weeks are noted below:    His average glucose has been 143 and TIR is 81%.  We did review some of the higher elevations he is having.  He did change his morning cereal from a keto crunch to Multi-grain Cheerios.  This is causing elevations in the morning.  We discussed taking more insulin if he is going to have this cereal.  When reviewing the remaining elevations they are related to meal choices.  Discussed taking different amount of insulin with different meals as his glucose seems to react differently.    Stated he has not been walking his daughter's dog as much as usual over the last month.  Typically her tries to walk her dog twice a day.  Lately it has been once every day, sometimes twice if he has time.  The holidays and weather have effected his routine.    All additional questions and concerns addressed today.    Patient's most recent   Lab Results   Component Value Date    A1C 7.7 06/11/2024     is meeting goal of <8.0    Diabetes knowledge and skills assessment:   Patient is knowledgeable in diabetes management concepts related to: Healthy Eating, Being Active, Monitoring, and Taking Medication    Based on learning assessment above, most appropriate setting for further diabetes education would be: Individual setting.    Care Plan and Education Provided:  Healthy Eating: Label reading, Taking Medication: Action of prescribed medication(s), and adjusting dose based on meals    Patient verbalized understanding of diabetes  "self-management education concepts discussed, opportunities for ongoing education and support, and recommendations provided today.    Plan    Efrain will work on taking more insulin when his meal effects his blood sugar more, based on previous experience.  He plans on getting back on track with walking his daughter's dog after the first of the year.  Topics to cover at upcoming visits: Reducing Risks    Follow-up:  Upcoming Diabetes Ed Appointments     No appointments to display        See Care Plan for co-developed, patient-state behavior change goals.    Education Materials Provided:  No new materials provided today      Subjective/Objective  Efrain is an 69 year old year old, presenting for the following diabetes education related to:    Cultural Influences/Ethnic Background:  Not  or       Diabetes Symptoms & Complications:          Patient Problem List and Family Medical History reviewed for relevant medical history, current medical status, and diabetes risk factors.    Vitals:  Wt 74.8 kg (165 lb)   BMI 26.63 kg/m    Estimated body mass index is 26.63 kg/m  as calculated from the following:    Height as of 11/20/24: 1.676 m (5' 6\").    Weight as of this encounter: 74.8 kg (165 lb).   Last 3 BP:   BP Readings from Last 3 Encounters:   11/20/24 126/62   06/11/24 120/64   12/01/23 132/74       History   Smoking Status    Never   Smokeless Tobacco    Never       Labs:  Lab Results   Component Value Date    A1C 7.7 06/11/2024     Lab Results   Component Value Date     11/20/2024     06/21/2022     Lab Results   Component Value Date    LDL 68 12/01/2023     11/04/2021     Direct Measure HDL   Date Value Ref Range Status   12/01/2023 56 >=40 mg/dL Final   ]  GFR Estimate   Date Value Ref Range Status   11/20/2024 >90 >60 mL/min/1.73m2 Final     Comment:     The generation of the estimated GFR is currently based on binary male or female sex. If the electronic health record information " "indicates another gender identity or if Legal Sex is recorded as \"Unknown\", GFR estimates are not automatically calculated, and application of GFR equations or a direct GFR measurement should be considered according to the individual's appropriate clinical context.  eGFR calculated using 2021 CKD-EPI equation.   10/30/2020 >60 >60 mL/min/1.73m2 Final     GFR Estimate If Black   Date Value Ref Range Status   10/30/2020 >60 >60 mL/min/1.73m2 Final     Lab Results   Component Value Date    CR 0.81 11/20/2024     No results found for: \"MICROALBUMIN\"    Healthy Eating:       Being Active:       Monitoring:           Taking Medications:  Diabetes Medication(s)       Biguanides       metFORMIN (GLUCOPHAGE) 1000 MG tablet TAKE 1 TABLET(1000 MG) BY MOUTH EVERY 12 HOURS       Diabetic Other       Glucagon (BAQSIMI ONE PACK) 3 MG/DOSE POWD Spray 1 spray (3 mg) in nostril daily as needed (severe hypoglycemia)       Insulin       insulin aspart (FIASP FLEXTOUCH) 100 UNIT/ML pen-injector INJECT 5 UNITS SUBCUTANEOUSLY 3 TIMES DAILY (WITH MEALS)     insulin degludec (TRESIBA FLEXTOUCH) 100 UNIT/ML pen INJECT 32 UNITS SUBCUTANEOUS DAILY     Insulin Degludec 100 UNIT/ML SOLN Inject 45 Units Subcutaneous daily     NOVOLOG FLEXPEN RELION 100 UNIT/ML pen Inject 5 Units subcutaneously 3 times daily (with meals).       Sodium-Glucose Co-Transporter 2 (SGLT2) Inhibitors       JARDIANCE 25 MG TABS tablet TAKE 1 TABLET BY MOUTH EVERY DAY       Incretin Mimetic Agents       TRULICITY 3 MG/0.5ML SOAJ INJECT 3 MG SUBCUTANEOUS EVERY 7 DAYS               Sidra Reynaga RN  Time Spent: 60 minutes  Encounter Type: Individual    Any diabetes medication dose changes were made via the CDC[facility] Standing Orders under the patient's referring provider.    "

## 2025-02-11 ENCOUNTER — LAB (OUTPATIENT)
Dept: LAB | Facility: CLINIC | Age: 70
End: 2025-02-11
Payer: MEDICARE

## 2025-02-11 DIAGNOSIS — Z79.4 TYPE 2 DIABETES MELLITUS WITH OTHER SPECIFIED COMPLICATION, WITH LONG-TERM CURRENT USE OF INSULIN (H): ICD-10-CM

## 2025-02-11 DIAGNOSIS — E11.69 TYPE 2 DIABETES MELLITUS WITH OTHER SPECIFIED COMPLICATION, WITH LONG-TERM CURRENT USE OF INSULIN (H): ICD-10-CM

## 2025-02-11 LAB
CHOLEST SERPL-MCNC: 158 MG/DL
CREAT SERPL-MCNC: 0.88 MG/DL (ref 0.51–1.17)
CREAT UR-MCNC: 61.6 MG/DL
EGFRCR SERPLBLD CKD-EPI 2021: >90 ML/MIN/1.73M2
EST. AVERAGE GLUCOSE BLD GHB EST-MCNC: 151 MG/DL
FASTING STATUS PATIENT QL REPORTED: NO
HBA1C MFR BLD: 6.9 % (ref 0–5.6)
HDLC SERPL-MCNC: 59 MG/DL
LDLC SERPL CALC-MCNC: 72 MG/DL
MICROALBUMIN UR-MCNC: <12 MG/L
MICROALBUMIN/CREAT UR: NORMAL MG/G{CREAT}
NONHDLC SERPL-MCNC: 99 MG/DL
TRIGL SERPL-MCNC: 134 MG/DL

## 2025-02-17 NOTE — PROGRESS NOTES
Subjective:    Established patient    Ced Fajardo is a 69 year old adult who presents to review DM.     Current DM therapy:  -Tresiba 32-0-0-0  -Fiasp started 7/2024: 6-8 units before meals   -Trulicity 3.0 mg weekly; well tolerated  -Metformin 1000 mg BID; well tolerated  -Jardiance 25 mg daily; well tolerated    Dexcom G7.      Walking 7-8 miles per day.     He has 3 meals per day, dinner is the biggest meal.  Snacks in the mid afternoon.    Objective:    6/2024: BMI 26.6 kg/m2, /64. Appears well.     5/2023: BMI 25.79 kg/m2, /62, 73 kg. Diabetic foot exam performed and normal. No lipohypertrophy/lipodystrophy at his abdominal insulin injection sites.    6/2022: Thyroid examined and normal. No cervical LAD.     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 No underlying masses. No bile duct dilatation. No pancreatic duct dilatation.  -6/2022: C-peptide 1.8 ng/mL with concurrent glucose 154 mg/dL   -10/2022: HbA1c 7.5% (6/2022 was 7.2%)   -3/2023: HbA1c 7.3%  -5/2023: HbA1c 7.7%  -12/1/2023: HbA1c 7.1%  -2/2025: HbA1c 6.9%  # Mild nonproliferative diabetic retinopathy, most recent DM eye exam 10/2024  # Hypertension, on lisinopril   -2/2025: GFR >90, urine microalbumin undetectable  # No definite peripheral neuropathy, no prior DM foot ulceration  # No prior ASCVD event  # Dyslipidemia, on pravastatin 80 mg daily and ASA  -2/2025: , HDL 59, LDL 72,   # Hepatic steatosis    CGM reviewed in detail. Over the past 2 weeks: GMI 7.5%, 58% TIR, 0% low, 36% high, 6% very high.    Suggest increasing Fiasp dosing, can consider 7 units before breakfast, 7 units before lunch, 9 units before evening. Sensitivity is ~20 mg/dL, meaning 1 unit of  Fiasp will lower glucose by ~20 mg/dL - can give extra Fiasp at meals if glucose is 130 mg/dL or higher basing the dose on the sensitivity.     No other changes in DM meds. Of note, Jardiance is very expensive, MTM referral placed to see if there are alternatives or cost savings programs that Beverley is eligible for.     He has glucagon available at home PRN.     Return to see me in the summer with HbA1c ordered.     20 minutes spent on the date of the encounter doing chart review, history and exam, documentation and further activities as noted above.     The longitudinal plan of care for the diagnosis(es)/condition(s) as documented were addressed during this visit. Due to the added complexity in care, I will continue to support beverlye in the subsequent management and with ongoing continuity of care.

## 2025-02-18 ENCOUNTER — MYC MEDICAL ADVICE (OUTPATIENT)
Dept: ENDOCRINOLOGY | Facility: CLINIC | Age: 70
End: 2025-02-18

## 2025-02-18 ENCOUNTER — OFFICE VISIT (OUTPATIENT)
Dept: ENDOCRINOLOGY | Facility: CLINIC | Age: 70
End: 2025-02-18
Payer: MEDICARE

## 2025-02-18 VITALS
HEART RATE: 80 BPM | SYSTOLIC BLOOD PRESSURE: 120 MMHG | WEIGHT: 170 LBS | DIASTOLIC BLOOD PRESSURE: 60 MMHG | BODY MASS INDEX: 27.44 KG/M2

## 2025-02-18 DIAGNOSIS — E11.65 TYPE 2 DIABETES MELLITUS WITH HYPERGLYCEMIA, WITH LONG-TERM CURRENT USE OF INSULIN (H): Primary | ICD-10-CM

## 2025-02-18 DIAGNOSIS — Z79.4 LONG TERM (CURRENT) USE OF INSULIN (H): ICD-10-CM

## 2025-02-18 DIAGNOSIS — E88.819 INSULIN RESISTANCE: ICD-10-CM

## 2025-02-18 DIAGNOSIS — I10 HYPERTENSION, UNSPECIFIED TYPE: ICD-10-CM

## 2025-02-18 DIAGNOSIS — E78.5 DYSLIPIDEMIA: ICD-10-CM

## 2025-02-18 DIAGNOSIS — K76.0 HEPATIC STEATOSIS: ICD-10-CM

## 2025-02-18 DIAGNOSIS — E66.3 OVERWEIGHT (BMI 25.0-29.9): ICD-10-CM

## 2025-02-18 DIAGNOSIS — Z79.4 TYPE 2 DIABETES MELLITUS WITH HYPERGLYCEMIA, WITH LONG-TERM CURRENT USE OF INSULIN (H): Primary | ICD-10-CM

## 2025-02-18 PROCEDURE — G2211 COMPLEX E/M VISIT ADD ON: HCPCS | Performed by: INTERNAL MEDICINE

## 2025-02-18 PROCEDURE — 99213 OFFICE O/P EST LOW 20 MIN: CPT | Performed by: INTERNAL MEDICINE

## 2025-02-18 NOTE — LETTER
2/18/2025      Ced Fajardo  [id number] [address] [place] [id number]      Dear Colleague,    Thank you for referring your patient, Ced Fajardo, to the [facility]. Please see a copy of my visit note below.    Subjective:    Established patient    Ced Fajardo is a 69 year old adult who presents to review DM.     Current DM therapy:  -Tresiba 32-0-0-0  -Fiasp started 7/2024: 6-8 units before meals   -Trulicity 3.0 mg weekly; well tolerated  -Metformin 1000 mg BID; well tolerated  -Jardiance 25 mg daily; well tolerated    Dexcom G7.      Walking 7-8 miles per day.     He has 3 meals per day, dinner is the biggest meal.  Snacks in the mid afternoon.    Objective:    6/2024: BMI 26.6 kg/m2, /64. Appears well.     5/2023: BMI 25.79 kg/m2, /62, 73 kg. Diabetic foot exam performed and normal. No lipohypertrophy/lipodystrophy at his abdominal insulin injection sites.    6/2022: Thyroid examined and normal. No cervical LAD.     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 No underlying masses. No bile duct dilatation. No pancreatic duct dilatation.  -6/2022: C-peptide 1.8 ng/mL with concurrent glucose 154 mg/dL   -10/2022: HbA1c 7.5% (6/2022 was 7.2%)   -3/2023: HbA1c 7.3%  -5/2023: HbA1c 7.7%  -12/1/2023: HbA1c 7.1%  -2/2025: HbA1c 6.9%  # Mild nonproliferative diabetic retinopathy, most recent DM eye exam 10/2024  # Hypertension, on lisinopril   -2/2025: GFR >90, urine microalbumin undetectable  # No definite peripheral neuropathy, no prior DM foot ulceration  # No prior ASCVD event  # Dyslipidemia, on pravastatin 80 mg daily and ASA  -2/2025: , HDL 59, LDL 72,   # Hepatic steatosis    CGM reviewed in  detail. Over the past 2 weeks: GMI 7.5%, 58% TIR, 0% low, 36% high, 6% very high.    Suggest increasing Fiasp dosing, can consider 7 units before breakfast, 7 units before lunch, 9 units before evening. Sensitivity is ~20 mg/dL, meaning 1 unit of Fiasp will lower glucose by ~20 mg/dL - can give extra Fiasp at meals if glucose is 130 mg/dL or higher basing the dose on the sensitivity.     No other changes in DM meds. Of note, Jardiance is very expensive, MTM referral placed to see if there are alternatives or cost savings programs that Beverley is eligible for.     He has glucagon available at home PRN.     Return to see me in the summer with HbA1c ordered.     20 minutes spent on the date of the encounter doing chart review, history and exam, documentation and further activities as noted above.     The longitudinal plan of care for the diagnosis(es)/condition(s) as documented were addressed during this visit. Due to the added complexity in care, I will continue to support beverley in the subsequent management and with ongoing continuity of care.      Again, thank you for allowing me to participate in the care of your patient.        Sincerely,        Nasir Young MD    Electronically signed

## 2025-03-14 ENCOUNTER — VIRTUAL VISIT (OUTPATIENT)
Dept: PHARMACY | Facility: CLINIC | Age: 70
End: 2025-03-14
Attending: INTERNAL MEDICINE
Payer: MEDICARE

## 2025-03-14 DIAGNOSIS — E11.9 DIABETES MELLITUS, TYPE 2 (H): Primary | ICD-10-CM

## 2025-03-14 RX ORDER — BEXAGLIFLOZIN 20 MG
TABLET ORAL
Status: CANCELLED | OUTPATIENT
Start: 2025-03-14

## 2025-03-14 NOTE — PROGRESS NOTES
Medication Therapy Management (MTM) Encounter    ASSESSMENT:                            Medication Adherence/Access: See below for considerations.    Diabetes: Patient is meeting A1c goal of <7. He was referred to discuss alternatives for his Jardiance as it is quite expensive. Patient has been on higher dose of Jardiance so splitting dose would not be beneficial at this time. Also discussed M3P program thru his insurace plan, patient not interested at this time. Lastly, we discussed switching to Brenzavvy thru [facility] pharmacy. Patient would like to go this route as it is the most affordable for him a this time. We discussed administration, side effects and storage of the medication today. Will touch base with endo provider about recommendation. Patient does have enough Jardiance for at least 2 months right now.    Due to time constraints, I was only able to assess the above with the patient today. Will follow-up on other disease states in future encounters  PLAN:                            I will follow-up with Dr. Young about switching Jardiance to Brenzavvy    Follow-up: as needed w/ Roula    SUBJECTIVE/OBJECTIVE:                          Efrain Fajardo is a 69 year old adult called for an initial visit. He was referred to me from Nasir Young      Reason for visit: SGLT2-I cost concerns.    Allergies/ADRs: Reviewed in chart  Past Medical History: Reviewed in chart  Tobacco: He reports that he has never smoked. He has never been exposed to tobacco smoke. He has never used smokeless tobacco.  Alcohol: rarely    Medication Adherence/Access: Jardiance is too expensive for him - reports paying $805 for 90 day supply - per test claim 149.28/month at this time; Trulicity cost is ok for him at this time    Diabetes   Patient diagnosed with Type 2 diabetes   Current Medications:  Jardiance 25 mg - 2 months supply left at this time  Trulicity 3 mg weekly -   Metformin   Tresiba 30 units daily   Novolog 7  "units every morning, 6-7 lunch, 8 units with dinner      Patient is not experiencing side effects.  Current diabetes symptoms: none  Blood sugar monitoring: Continuous Glucose Monitor see AGP below      Lab Results   Component Value Date    A1C 6.9 (H) 02/11/2025    A1C 7.7 (H) 06/11/2024    A1C 7.1 (H) 12/01/2023     Estimated body mass index is 27.44 kg/m  as calculated from the following:    Height as of 11/20/24: 5' 6\" (1.676 m).    Weight as of 2/18/25: 170 lb (77.1 kg).    Wt Readings from Last 3 Encounters:   02/18/25 170 lb (77.1 kg)   11/26/24 165 lb (74.8 kg)   11/20/24 165 lb (74.8 kg)     Lab Results   Component Value Date    GFRESTIMATED >90 02/11/2025    GFRESTIMATED >90 11/20/2024    GFRESTIMATED >90 06/11/2024          Today's Vitals: There were no vitals taken for this visit.  ----------------      I spent 30 minutes with this patient today. All changes were made via collaborative practice agreement with Nasir Young     A summary of these recommendations was sent via [employer].    Yoly BarberD  Diabetes & Endocrine MTM Pharmacist     Telemedicine Visit Details  The patient's medications can be safely assessed via a telemedicine encounter.  Type of service:  Telephone visit  Originating Location (pt. Location): Home    Distant Location (provider location):  Off-site  Start Time:  1 PM  End Time:  1:30 PM     Medication Therapy Recommendations  Diabetes mellitus, type 2 (H)   1 Current Medication: JARDIANCE 25 MG TABS tablet   Current Medication Sig: TAKE 1 TABLET BY MOUTH EVERY DAY   Rationale: More cost-effective medication available - Cost - Adherence   Recommendation: Change Medication - Brenzavvy 20 MG Tabs   Status: Contact Provider - Awaiting Response   Identified Date: 3/14/2025            "

## 2025-03-20 NOTE — PATIENT INSTRUCTIONS
"Recommendations from today's MTM visit:                                                      I will follow-up with Dr. Young about switching Jardiance to Brenzavvy    Follow-up: as needed w/ Roula    It was great speaking with you today.  I value your experience and would be very thankful for your time in providing feedback in our clinic survey. In the next few days, you may receive an email or text message from [employer] "[employer] Fast [employer]t [employer]" with a link to a survey related to your  clinical pharmacist.\"     To schedule another MTM appointment, please call the clinic directly or you may call the MTM scheduling line at [phone].    My Clinical Pharmacist's contact information:                                                      Please feel free to contact me with any questions or concerns you have.      Roula Lee, PharmD  Diabetes & Endocrine MTM Pharmacist    "

## 2025-03-22 DIAGNOSIS — E11.65 TYPE 2 DIABETES MELLITUS WITH HYPERGLYCEMIA, WITH LONG-TERM CURRENT USE OF INSULIN (H): ICD-10-CM

## 2025-03-22 DIAGNOSIS — Z79.4 TYPE 2 DIABETES MELLITUS WITH HYPERGLYCEMIA, WITH LONG-TERM CURRENT USE OF INSULIN (H): ICD-10-CM

## 2025-03-24 DIAGNOSIS — E11.65 TYPE 2 DIABETES MELLITUS WITH HYPERGLYCEMIA, WITH LONG-TERM CURRENT USE OF INSULIN (H): ICD-10-CM

## 2025-03-24 DIAGNOSIS — Z79.4 TYPE 2 DIABETES MELLITUS WITH HYPERGLYCEMIA, WITH LONG-TERM CURRENT USE OF INSULIN (H): ICD-10-CM

## 2025-03-24 RX ORDER — DULAGLUTIDE 3 MG/.5ML
3 INJECTION, SOLUTION SUBCUTANEOUS
Qty: 6 ML | Refills: 0 | Status: SHIPPED | OUTPATIENT
Start: 2025-03-24

## 2025-03-24 RX ORDER — DULAGLUTIDE 3 MG/.5ML
3 INJECTION, SOLUTION SUBCUTANEOUS
OUTPATIENT
Start: 2025-03-24

## 2025-04-14 DIAGNOSIS — Z79.4 TYPE 2 DIABETES MELLITUS WITH HYPERGLYCEMIA, WITH LONG-TERM CURRENT USE OF INSULIN (H): ICD-10-CM

## 2025-04-14 DIAGNOSIS — E11.65 TYPE 2 DIABETES MELLITUS WITH HYPERGLYCEMIA, WITH LONG-TERM CURRENT USE OF INSULIN (H): ICD-10-CM

## 2025-04-15 DIAGNOSIS — E11.65 TYPE 2 DIABETES MELLITUS WITH HYPERGLYCEMIA, WITH LONG-TERM CURRENT USE OF INSULIN (H): ICD-10-CM

## 2025-04-15 DIAGNOSIS — Z79.4 TYPE 2 DIABETES MELLITUS WITH HYPERGLYCEMIA, WITH LONG-TERM CURRENT USE OF INSULIN (H): ICD-10-CM

## 2025-04-15 RX ORDER — INSULIN DEGLUDEC 100 U/ML
32 INJECTION, SOLUTION SUBCUTANEOUS DAILY
Refills: 1 | OUTPATIENT
Start: 2025-04-15

## 2025-04-15 RX ORDER — INSULIN DEGLUDEC 100 U/ML
INJECTION, SOLUTION SUBCUTANEOUS
Refills: 0 | OUTPATIENT
Start: 2025-04-15

## 2025-04-19 DIAGNOSIS — Z79.4 TYPE 2 DIABETES MELLITUS WITH HYPERGLYCEMIA, WITH LONG-TERM CURRENT USE OF INSULIN (H): ICD-10-CM

## 2025-04-19 DIAGNOSIS — E11.65 TYPE 2 DIABETES MELLITUS WITH HYPERGLYCEMIA, WITH LONG-TERM CURRENT USE OF INSULIN (H): ICD-10-CM

## 2025-04-21 DIAGNOSIS — Z79.4 TYPE 2 DIABETES MELLITUS WITH HYPERGLYCEMIA, WITH LONG-TERM CURRENT USE OF INSULIN (H): ICD-10-CM

## 2025-04-21 DIAGNOSIS — E11.65 TYPE 2 DIABETES MELLITUS WITH HYPERGLYCEMIA, WITH LONG-TERM CURRENT USE OF INSULIN (H): ICD-10-CM

## 2025-04-21 RX ORDER — INSULIN ASPART INJECTION 100 [IU]/ML
5 INJECTION, SOLUTION SUBCUTANEOUS
Refills: 2 | OUTPATIENT
Start: 2025-04-21

## 2025-04-21 RX ORDER — INSULIN ASPART 100 [IU]/ML
INJECTION, SOLUTION INTRAVENOUS; SUBCUTANEOUS
Qty: 15 ML | Refills: 1 | Status: SHIPPED | OUTPATIENT
Start: 2025-04-21

## 2025-04-21 RX ORDER — INSULIN ASPART INJECTION 100 [IU]/ML
INJECTION, SOLUTION SUBCUTANEOUS
Refills: 2 | OUTPATIENT
Start: 2025-04-21

## 2025-04-21 NOTE — TELEPHONE ENCOUNTER
Requested Prescriptions   Pending Prescriptions Disp Refills    insulin aspart (FIASP FLEXTOUCH) 100 UNIT/ML pen-injector [Pharmacy Med Name: FIASP 100 UNIT/ML FLEXTOUCH]  2     Sig: INJECT 5 UNITS SUBCUTANEOUSLY 3 TIMES DAILY (WITH MEALS)       There is no refill protocol information for this order            From my note today: suggest increasing Fiasp dosing, can consider 7 units before breakfast, 7 units before lunch, 9 units before evening. Sensitivity is ~20 mg/dL, meaning 1 unit of Fiasp will lower glucose by ~20 mg/dL - can give extra Fiasp at meals if glucose is 130 mg/dL or higher basing the dose on the sensitivity. Continue to adjust dosing as needed with goal glucose fasting and before meals <130 mg/dL.

## 2025-06-04 ENCOUNTER — LAB (OUTPATIENT)
Dept: LAB | Facility: CLINIC | Age: 70
End: 2025-06-04
Payer: MEDICARE

## 2025-06-04 DIAGNOSIS — Z79.4 TYPE 2 DIABETES MELLITUS WITH HYPERGLYCEMIA, WITH LONG-TERM CURRENT USE OF INSULIN (H): ICD-10-CM

## 2025-06-04 DIAGNOSIS — E11.65 TYPE 2 DIABETES MELLITUS WITH HYPERGLYCEMIA, WITH LONG-TERM CURRENT USE OF INSULIN (H): ICD-10-CM

## 2025-06-04 LAB
EST. AVERAGE GLUCOSE BLD GHB EST-MCNC: 154 MG/DL
HBA1C MFR BLD: 7 % (ref 0–5.6)

## 2025-06-04 PROCEDURE — 83036 HEMOGLOBIN GLYCOSYLATED A1C: CPT

## 2025-06-04 PROCEDURE — 3051F HG A1C>EQUAL 7.0%<8.0%: CPT

## 2025-06-04 PROCEDURE — 36415 COLL VENOUS BLD VENIPUNCTURE: CPT

## 2025-06-09 NOTE — PROGRESS NOTES
Subjective:    Established patient    Ced Fajardo is a 69 year old adult who presents to review DM.     Current DM therapy:  -Tresiba 32-0-0-0  -NovoLo -10 units with meals; CS using a sensitivity of 20 mg/dL starting at 130 mg/dL   -Trulicity 3.0 mg weekly; well tolerated  -Metformin 1000 mg BID; well tolerated  -Bexagliflozin 20 mg daily (through the [facility] due to cost)     Dexcom G7.      Walking 7-8 miles per day.     He has 3 meals per day, dinner is the biggest meal.  Snacks in the mid afternoon.    Objective:    BMI 27.6 kg/m2, /76    2024: BMI 26.6 kg/m2, /64. Appears well.     2023: BMI 25.79 kg/m2, /62, 73 kg. Diabetic foot exam performed and normal. No lipohypertrophy/lipodystrophy at his abdominal insulin injection sites.    2022: Thyroid examined and normal. No cervical LAD.     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 No underlying masses. No bile duct dilatation. No pancreatic duct dilatation.  -2022: C-peptide 1.8 ng/mL with concurrent glucose 154 mg/dL   -10/2022: HbA1c 7.5% (2022 was 7.2%)   -3/2023: HbA1c 7.3%  -2023: HbA1c 7.7%  -2023: HbA1c 7.1%  -2025: HbA1c 6.9%  -2025: HbA1c 7.0%  # Mild nonproliferative diabetic retinopathy, most recent DM eye exam 10/2024  # Hypertension, on lisinopril   -2025: GFR >90, urine microalbumin undetectable  # No definite peripheral neuropathy, no prior DM foot ulceration  # No prior ASCVD event  # Dyslipidemia, on pravastatin 80 mg daily and ASA  -2025: , HDL 59, LDL 72,   # Hepatic steatosis    CGM reviewed in detail. Over the past 2 weeks: GMI 7.6%, 56% TIR, 0% low, 39% high, 5% very high.    Main pattern is post-prandial  hyperglycemia. Only change today is increasing his NovoLog base dose to 11 units.     Reviewed calorie tracking and aiming for lower carb meals.    Reviewed insulin pump therapy in detail - Beverley would like to defer for now.     He has glucagon available at home PRN.     He saw Roula Lee [facility], 3/14/2025.    Return in 6 months with labs ordered.     23 minutes spent on the date of the encounter doing chart review, history and exam, documentation and further activities as noted above.     The longitudinal plan of care for the diagnosis(es)/condition(s) as documented were addressed during this visit. Due to the added complexity in care, I will continue to support beverley in the subsequent management and with ongoing continuity of care.

## 2025-06-10 ENCOUNTER — OFFICE VISIT (OUTPATIENT)
Dept: ENDOCRINOLOGY | Facility: CLINIC | Age: 70
End: 2025-06-10
Payer: MEDICARE

## 2025-06-10 VITALS
BODY MASS INDEX: 27.6 KG/M2 | HEART RATE: 84 BPM | SYSTOLIC BLOOD PRESSURE: 124 MMHG | WEIGHT: 171 LBS | DIASTOLIC BLOOD PRESSURE: 76 MMHG

## 2025-06-10 DIAGNOSIS — E11.65 TYPE 2 DIABETES MELLITUS WITH HYPERGLYCEMIA, WITH LONG-TERM CURRENT USE OF INSULIN (H): ICD-10-CM

## 2025-06-10 DIAGNOSIS — E78.5 DYSLIPIDEMIA: ICD-10-CM

## 2025-06-10 DIAGNOSIS — E66.3 OVERWEIGHT (BMI 25.0-29.9): ICD-10-CM

## 2025-06-10 DIAGNOSIS — E88.819 INSULIN RESISTANCE: ICD-10-CM

## 2025-06-10 DIAGNOSIS — I10 HYPERTENSION, UNSPECIFIED TYPE: ICD-10-CM

## 2025-06-10 DIAGNOSIS — Z79.4 TYPE 2 DIABETES MELLITUS WITH HYPERGLYCEMIA, WITH LONG-TERM CURRENT USE OF INSULIN (H): ICD-10-CM

## 2025-06-10 DIAGNOSIS — Z79.4 LONG TERM (CURRENT) USE OF INSULIN (H): Primary | ICD-10-CM

## 2025-06-10 RX ORDER — INSULIN ASPART 100 [IU]/ML
INJECTION, SOLUTION INTRAVENOUS; SUBCUTANEOUS
Qty: 60 ML | Refills: 4 | Status: SHIPPED | OUTPATIENT
Start: 2025-06-10

## 2025-06-10 RX ORDER — ACYCLOVIR 400 MG/1
TABLET ORAL
Qty: 9 EACH | Refills: 0 | Status: SHIPPED | OUTPATIENT
Start: 2025-06-10

## 2025-06-10 RX ORDER — GLUCAGON 3 MG/1
3 POWDER NASAL DAILY PRN
Qty: 1 EACH | Refills: 3 | Status: SHIPPED | OUTPATIENT
Start: 2025-06-10 | End: 2025-06-10

## 2025-06-10 RX ORDER — GLUCAGON INJECTION, SOLUTION 0.5 MG/.1ML
INJECTION, SOLUTION SUBCUTANEOUS
Qty: 2 EACH | Refills: 4 | Status: SHIPPED | OUTPATIENT
Start: 2025-06-10

## 2025-06-10 NOTE — TELEPHONE ENCOUNTER
Requested Prescriptions   Pending Prescriptions Disp Refills    Continuous Glucose Sensor (DEXCOM G7 SENSOR) MISC [Pharmacy Med Name: DEXCOM G7 SENSOR  MISC]  0     Sig: CHANGE EVERY 10 DAYS       There is no refill protocol information for this order           2.726

## 2025-06-10 NOTE — LETTER
6/10/2025      Ced Fajardo  [address] [address] [place] [id number]      Dear Colleague,    Thank you for referring your patient, Ced Fajardo, to the [facility]. Please see a copy of my visit note below.    Subjective:    Established patient    Ced Fajardo is a 69 year old adult who presents to review DM.     Current DM therapy:  -Tresiba 32-0-0-0  -NovoLo -10 units with meals; CS using a sensitivity of 20 mg/dL starting at 130 mg/dL   -Trulicity 3.0 mg weekly; well tolerated  -Metformin 1000 mg BID; well tolerated  -Bexagliflozin 20 mg daily (through the [facility] due to cost)     Dexcom G7.      Walking 7-8 miles per day.     He has 3 meals per day, dinner is the biggest meal.  Snacks in the mid afternoon.    Objective:    BMI 27.6 kg/m2, /76    2024: BMI 26.6 kg/m2, /64. Appears well.     2023: BMI 25.79 kg/m2, /62, 73 kg. Diabetic foot exam performed and normal. No lipohypertrophy/lipodystrophy at his abdominal insulin injection sites.    2022: Thyroid examined and normal. No cervical LAD.     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 No underlying masses. No bile duct dilatation. No pancreatic duct dilatation.  -2022: C-peptide 1.8 ng/mL with concurrent glucose 154 mg/dL   -10/2022: HbA1c 7.5% (2022 was 7.2%)   -3/2023: HbA1c 7.3%  -2023: HbA1c 7.7%  -2023: HbA1c 7.1%  -2025: HbA1c 6.9%  -2025: HbA1c 7.0%  # Mild nonproliferative diabetic retinopathy, most recent DM eye exam 10/2024  # Hypertension, on lisinopril   -2025: GFR >90, urine microalbumin undetectable  # No definite peripheral neuropathy, no prior DM foot ulceration  # No prior ASCVD event  #  Dyslipidemia, on pravastatin 80 mg daily and ASA  -2/2025: , HDL 59, LDL 72,   # Hepatic steatosis    CGM reviewed in detail. Over the past 2 weeks: GMI 7.6%, 56% TIR, 0% low, 39% high, 5% very high.    Main pattern is post-prandial hyperglycemia. Only change today is increasing his NovoLog base dose to 11 units.     Reviewed calorie tracking and aiming for lower carb meals.    Reviewed insulin pump therapy in detail - Beverley would like to defer for now.     He has glucagon available at home PRN.     He saw Roula Lee [facility], 3/14/2025.    Return in 6 months with labs ordered.     23 minutes spent on the date of the encounter doing chart review, history and exam, documentation and further activities as noted above.     The longitudinal plan of care for the diagnosis(es)/condition(s) as documented were addressed during this visit. Due to the added complexity in care, I will continue to support beverley in the subsequent management and with ongoing continuity of care.    Again, thank you for allowing me to participate in the care of your patient.        Sincerely,        Nasir Young MD    Electronically signed

## 2025-06-10 NOTE — TELEPHONE ENCOUNTER
Requested Prescriptions   Pending Prescriptions Disp Refills    Glucagon (GVOKE HYPOPEN 1-PACK) 0.5 MG/0.1ML pen [Pharmacy Med Name: GVOKE HYPOPEN 1PK 0.5MG/0.1 ML]  0       Glucagon Protocol Failed - 6/10/2025  8:46 AM        Failed - Medication is active on med list and the sig matches. RN to manually verify dose and sig if red X/fail.     If the protocol passes (green check), you do not need to verify med dose and sig.    A prescription matches if they are the same clinical intention.    For Example: once daily and every morning are the same.    The protocol can not identify upper and lower case letters as matching and will fail.     For Example: Take 1 tablet (50 mg) by mouth daily     TAKE 1 TABLET (50 MG) BY MOUTH DAILY    For all fails (red x), verify dose and sig.    If the refill does match what is on file, the RN can still proceed to approve the refill request.       If they do not match, route to the appropriate provider.             Passed - Recent (6 month) or future (90 days) visit with the authorizing provider's specialty (provided they have been seen in the past 9 months)        Passed - Patient is 18 years of age or older

## 2025-06-11 DIAGNOSIS — E11.65 TYPE 2 DIABETES MELLITUS WITH HYPERGLYCEMIA, WITHOUT LONG-TERM CURRENT USE OF INSULIN (H): ICD-10-CM

## 2025-06-15 DIAGNOSIS — Z79.4 TYPE 2 DIABETES MELLITUS WITH HYPERGLYCEMIA, WITH LONG-TERM CURRENT USE OF INSULIN (H): ICD-10-CM

## 2025-06-15 DIAGNOSIS — E11.65 TYPE 2 DIABETES MELLITUS WITH HYPERGLYCEMIA, WITH LONG-TERM CURRENT USE OF INSULIN (H): ICD-10-CM

## 2025-06-16 RX ORDER — DULAGLUTIDE 3 MG/.5ML
3 INJECTION, SOLUTION SUBCUTANEOUS
Qty: 6 ML | Refills: 3 | Status: SHIPPED | OUTPATIENT
Start: 2025-06-16

## 2025-08-04 DIAGNOSIS — Z12.11 COLON CANCER SCREENING: Primary | ICD-10-CM

## 2025-08-11 DIAGNOSIS — Z12.11 COLON CANCER SCREENING: ICD-10-CM
